# Patient Record
Sex: FEMALE | Race: ASIAN | NOT HISPANIC OR LATINO | Employment: OTHER | URBAN - METROPOLITAN AREA
[De-identification: names, ages, dates, MRNs, and addresses within clinical notes are randomized per-mention and may not be internally consistent; named-entity substitution may affect disease eponyms.]

---

## 2017-02-07 ENCOUNTER — HOSPITAL ENCOUNTER (OUTPATIENT)
Dept: CARDIOLOGY | Facility: MEDICAL CENTER | Age: 72
End: 2017-02-07
Attending: INTERNAL MEDICINE
Payer: MEDICARE

## 2017-02-07 DIAGNOSIS — R06.09 DYSPNEA ON EXERTION: ICD-10-CM

## 2017-02-07 LAB — LV EJECT FRACT  99904: 70

## 2017-02-07 PROCEDURE — 93018 CV STRESS TEST I&R ONLY: CPT | Performed by: INTERNAL MEDICINE

## 2017-02-07 PROCEDURE — 93017 CV STRESS TEST TRACING ONLY: CPT

## 2017-02-07 PROCEDURE — 93350 STRESS TTE ONLY: CPT | Mod: 26 | Performed by: INTERNAL MEDICINE

## 2017-02-07 PROCEDURE — 93350 STRESS TTE ONLY: CPT

## 2017-02-14 ENCOUNTER — HOSPITAL ENCOUNTER (OUTPATIENT)
Dept: RADIOLOGY | Facility: MEDICAL CENTER | Age: 72
End: 2017-02-14
Attending: INTERNAL MEDICINE
Payer: MEDICARE

## 2017-02-14 DIAGNOSIS — Z78.0 MENOPAUSE: ICD-10-CM

## 2017-02-14 DIAGNOSIS — M81.0 OSTEOPOROSIS: ICD-10-CM

## 2017-02-14 DIAGNOSIS — Z12.31 ENCOUNTER FOR SCREENING MAMMOGRAM FOR BREAST CANCER: ICD-10-CM

## 2017-02-14 PROCEDURE — 77063 BREAST TOMOSYNTHESIS BI: CPT

## 2017-02-14 PROCEDURE — 77080 DXA BONE DENSITY AXIAL: CPT

## 2017-02-14 RX ORDER — ALENDRONATE SODIUM 70 MG/1
70 TABLET ORAL
Qty: 12 TAB | Refills: 3 | Status: SHIPPED | OUTPATIENT
Start: 2017-02-14 | End: 2018-02-12 | Stop reason: SDUPTHER

## 2017-02-28 ENCOUNTER — HOSPITAL ENCOUNTER (OUTPATIENT)
Dept: LAB | Facility: MEDICAL CENTER | Age: 72
End: 2017-02-28
Attending: INTERNAL MEDICINE
Payer: MEDICARE

## 2017-02-28 DIAGNOSIS — E78.5 DYSLIPIDEMIA: ICD-10-CM

## 2017-02-28 DIAGNOSIS — I10 ESSENTIAL HYPERTENSION: ICD-10-CM

## 2017-02-28 DIAGNOSIS — E11.9 TYPE 2 DIABETES MELLITUS WITHOUT COMPLICATION, WITHOUT LONG-TERM CURRENT USE OF INSULIN (HCC): ICD-10-CM

## 2017-02-28 LAB
25(OH)D3 SERPL-MCNC: 29 NG/ML (ref 30–100)
CHOLEST SERPL-MCNC: 170 MG/DL (ref 100–199)
CREAT SERPL-MCNC: 0.59 MG/DL (ref 0.5–1.4)
HDLC SERPL-MCNC: 52 MG/DL
LDLC SERPL CALC-MCNC: 90 MG/DL
POTASSIUM SERPL-SCNC: 4 MMOL/L (ref 3.6–5.5)
TRIGL SERPL-MCNC: 142 MG/DL (ref 0–149)

## 2017-02-28 PROCEDURE — 82306 VITAMIN D 25 HYDROXY: CPT | Mod: GA

## 2017-02-28 PROCEDURE — 80061 LIPID PANEL: CPT

## 2017-02-28 PROCEDURE — 83695 ASSAY OF LIPOPROTEIN(A): CPT

## 2017-02-28 PROCEDURE — 83036 HEMOGLOBIN GLYCOSYLATED A1C: CPT | Mod: GA

## 2017-02-28 PROCEDURE — 84132 ASSAY OF SERUM POTASSIUM: CPT

## 2017-02-28 PROCEDURE — 82565 ASSAY OF CREATININE: CPT

## 2017-02-28 PROCEDURE — 36415 COLL VENOUS BLD VENIPUNCTURE: CPT | Mod: GA

## 2017-03-01 LAB
EST. AVERAGE GLUCOSE BLD GHB EST-MCNC: 137 MG/DL
HBA1C MFR BLD: 6.4 % (ref 0–5.6)

## 2017-03-02 LAB — LPA SERPL-MCNC: 3 MG/DL

## 2017-03-30 ENCOUNTER — OFFICE VISIT (OUTPATIENT)
Dept: MEDICAL GROUP | Facility: PHYSICIAN GROUP | Age: 72
End: 2017-03-30
Payer: MEDICARE

## 2017-03-30 ENCOUNTER — PATIENT MESSAGE (OUTPATIENT)
Dept: MEDICAL GROUP | Facility: PHYSICIAN GROUP | Age: 72
End: 2017-03-30

## 2017-03-30 VITALS
DIASTOLIC BLOOD PRESSURE: 70 MMHG | HEART RATE: 64 BPM | OXYGEN SATURATION: 98 % | TEMPERATURE: 97.2 F | SYSTOLIC BLOOD PRESSURE: 130 MMHG | BODY MASS INDEX: 24.54 KG/M2 | HEIGHT: 62 IN | WEIGHT: 133.38 LBS

## 2017-03-30 DIAGNOSIS — E55.9 VITAMIN D DEFICIENCY: ICD-10-CM

## 2017-03-30 DIAGNOSIS — R01.1 HEART MURMUR: ICD-10-CM

## 2017-03-30 DIAGNOSIS — E78.00 HYPERCHOLESTEREMIA: ICD-10-CM

## 2017-03-30 DIAGNOSIS — H91.93 HEARING LOSS, BILATERAL: ICD-10-CM

## 2017-03-30 DIAGNOSIS — R00.2 INTERMITTENT PALPITATIONS: ICD-10-CM

## 2017-03-30 DIAGNOSIS — E11.9 TYPE 2 DIABETES MELLITUS WITHOUT COMPLICATION, WITHOUT LONG-TERM CURRENT USE OF INSULIN (HCC): ICD-10-CM

## 2017-03-30 DIAGNOSIS — R06.09 DYSPNEA ON EXERTION: ICD-10-CM

## 2017-03-30 DIAGNOSIS — I10 ESSENTIAL HYPERTENSION: ICD-10-CM

## 2017-03-30 DIAGNOSIS — Z23 NEED FOR VACCINATION: ICD-10-CM

## 2017-03-30 PROCEDURE — 1036F TOBACCO NON-USER: CPT | Performed by: INTERNAL MEDICINE

## 2017-03-30 PROCEDURE — 4040F PNEUMOC VAC/ADMIN/RCVD: CPT | Performed by: INTERNAL MEDICINE

## 2017-03-30 PROCEDURE — 90670 PCV13 VACCINE IM: CPT | Performed by: INTERNAL MEDICINE

## 2017-03-30 PROCEDURE — 3044F HG A1C LEVEL LT 7.0%: CPT | Performed by: INTERNAL MEDICINE

## 2017-03-30 PROCEDURE — G8482 FLU IMMUNIZE ORDER/ADMIN: HCPCS | Performed by: INTERNAL MEDICINE

## 2017-03-30 PROCEDURE — 99214 OFFICE O/P EST MOD 30 MIN: CPT | Mod: 25 | Performed by: INTERNAL MEDICINE

## 2017-03-30 PROCEDURE — G0009 ADMIN PNEUMOCOCCAL VACCINE: HCPCS | Performed by: INTERNAL MEDICINE

## 2017-03-30 PROCEDURE — G8420 CALC BMI NORM PARAMETERS: HCPCS | Performed by: INTERNAL MEDICINE

## 2017-03-30 PROCEDURE — 1101F PT FALLS ASSESS-DOCD LE1/YR: CPT | Performed by: INTERNAL MEDICINE

## 2017-03-30 PROCEDURE — G8432 DEP SCR NOT DOC, RNG: HCPCS | Performed by: INTERNAL MEDICINE

## 2017-03-30 PROCEDURE — 3014F SCREEN MAMMO DOC REV: CPT | Performed by: INTERNAL MEDICINE

## 2017-03-30 RX ORDER — LISINOPRIL 10 MG/1
10 TABLET ORAL DAILY
Qty: 90 TAB | Refills: 3 | Status: SHIPPED | OUTPATIENT
Start: 2017-03-30 | End: 2018-05-24 | Stop reason: SDUPTHER

## 2017-03-30 NOTE — ASSESSMENT & PLAN NOTE
She stop lisinopril on her own. Pt well controled only on amlodipine. I discussed with pt, since she is DMII, she will benefits more from lisinopril than amlodipine. Agreed on starting lisinopril again, increase to 10 mg daily.  She denies any chest pain, palpitation, shortness of breath, leg swellings, headaches, blurry visions.

## 2017-03-30 NOTE — PROGRESS NOTES
Subjective:   Kacey Whelan is a 71 y.o. female here today for DMII, lab work review, hypertension, discontinue some medications     Vitamin D deficiency  On supplement. Last level 29. Continue to monitor     Type 2 diabetes mellitus without complication, without long-term current use of insulin (CMS-[facility])  Last A1c 6.4. She'll like to be off metformin. She will continue to work on diet control. She stopped taking lisinopril. Advised to continue lisinopril. Decided to stop Amlodipine, and we will increase lisinopril to 10 mg. Patient agreed with the plan. LDL less than 100. She is on baby aspirin 81 mg daily. Will reevaluate in 3 months. She denies polyphagia, polyuria, nocturia, numbness or tingling. I advised patient to continue monitor blood sugar only 3 times a week. She will check one time fasting, before lunch and at bedtime.     Intermittent palpitations  She had stress echocardiogram, noted PVC. Stable.     Hypercholesteremia  Lovastatin 10 mg daily. LDL 90 (02/2017). Primary prevention . Continue to monitor     HTN (hypertension)  She stop lisinopril on her own. Pt well controled only on amlodipine. I discussed with pt, since she is DMII, she will benefits more from lisinopril than amlodipine. Agreed on starting lisinopril again, increase to 10 mg daily.  She denies any chest pain, palpitation, shortness of breath, leg swellings, headaches, blurry visions.    Heart murmur  She is systolic ejection murmur 3 out of 5 motor , pronounced to the left second intercostal area. Patient had a gross stress test but she did not have an echocardiogram. Still I don't have evaluation of her valves . Sometimes she has some shortness of breath which I'm not sure if that is related to valve problem. Stress echocardiogram negative for ischemia     Hearing loss  During low since she was 32-year-old. She cannot afford hearing aids. She denies any earache, ringing ears, headaches, balance problems.       Current medicines  (including changes today)  Current Outpatient Prescriptions   Medication Sig Dispense Refill   • Calcium Citrate-Vitamin D (CALCIUM + D PO) Take  by mouth.     • lisinopril (PRINIVIL) 10 MG Tab Take 1 Tab by mouth every day. 90 Tab 3   • alendronate (FOSAMAX) 70 MG Tab Take 1 Tab by mouth every 7 days. 12 Tab 3   • therapeutic multivitamin-minerals (THERAGRAN-M) Tab Take 1 Tab by mouth every day.     • lovastatin (MEVACOR) 10 MG tablet Take 1 Tab by mouth every day. In the evening. 90 Tab 3   • aspirin 81 MG tablet Take 1 Tab by mouth every day. 100 Tab 3   • Blood Glucose Monitoring Suppl SUPPLIES Misc Test strips order: Test strips for Meter that's covered by insurance. Sig: use tid and prn ssx high or low sugar #100 RF x 3 100 Strip 11   • Lancets Misc Lancets order: Lancets  Sig: use bid and prn ssx high or low sugar. #100 RF x 3 100 Each 11   • Blood Glucose Monitoring Suppl Device Meter: Dispense Device of Insurance Preference. Sig. Use as directed for blood sugar monitoring. #1. NR. 1 Device 1     No current facility-administered medications for this visit.     She  has a past medical history of Diabetes (CMS-[facility]); Hypertension; Hearing loss; and Hyperlipidemia.    Current Outpatient Prescriptions   Medication Sig Dispense Refill   • Calcium Citrate-Vitamin D (CALCIUM + D PO) Take  by mouth.     • lisinopril (PRINIVIL) 10 MG Tab Take 1 Tab by mouth every day. 90 Tab 3   • alendronate (FOSAMAX) 70 MG Tab Take 1 Tab by mouth every 7 days. 12 Tab 3   • therapeutic multivitamin-minerals (THERAGRAN-M) Tab Take 1 Tab by mouth every day.     • lovastatin (MEVACOR) 10 MG tablet Take 1 Tab by mouth every day. In the evening. 90 Tab 3   • aspirin 81 MG tablet Take 1 Tab by mouth every day. 100 Tab 3   • Blood Glucose Monitoring Suppl SUPPLIES Misc Test strips order: Test strips for Meter that's covered by insurance. Sig: use tid and prn ssx high or low sugar #100 RF x 3 100 Strip 11   • Lancets Misc Lancets order:  "Lancets  Sig: use bid and prn ssx high or low sugar. #100 RF x 3 100 Each 11   • Blood Glucose Monitoring Suppl Device Meter: Dispense Device of Insurance Preference. Sig. Use as directed for blood sugar monitoring. #1. NR. 1 Device 1     No current facility-administered medications for this visit.       Allergies as of 03/30/2017 - Jonatan as Reviewed 03/30/2017   Allergen Reaction Noted   • Latex  03/06/2015       Social History     Social History   • Marital Status:      Spouse Name: N/A   • Number of Children: N/A   • Years of Education: N/A     Occupational History   • Not on file.     Social History Main Topics   • Smoking status: Never Smoker    • Smokeless tobacco: Never Used   • Alcohol Use: No   • Drug Use: No   • Sexual Activity: No      Comment:      Other Topics Concern   • Not on file     Social History Narrative        Family History   Problem Relation Age of Onset   • Cancer Mother      breast cancer   • Heart Disease Father 46     MI   • Heart Disease Sister      2 heart surgery   • Diabetes Brother    • Cancer Brother      brain tumor       Past Surgical History   Procedure Laterality Date   • Primary c section       3 c section   • Tonsillectomy         ROS   All systems reviewed are negative except for history of present illness     Objective:     Blood pressure 130/70, pulse 64, temperature 36.2 °C (97.2 °F), height 1.575 m (5' 2\"), weight 60.5 kg (133 lb 6.1 oz), SpO2 98 %. Body mass index is 24.39 kg/(m^2).   Physical Exam:  Constitutional: Alert, no distress.  Skin: Warm, dry, good turgor, no rashes in visible areas.  Eye: Equal, round and reactive, conjunctiva clear, lids normal.  ENMT: Lips without lesions, good dentition, oropharynx clear.  Neck: Trachea midline, no masses, no thyromegaly. No cervical or supraclavicular lymphadenopathy  Respiratory: Unlabored respiratory effort, lungs clear to auscultation, no wheezes, no ronchi.  Cardiovascular: Normal S1, S2,  Ejection murmur " 3/5, no edema.  Abdomen: Soft, non-tender, no masses, no hepatosplenomegaly.  Psych: Alert and oriented x3, normal affect and mood.        Assessment and Plan:   The following treatment plan was discussed    1. Type 2 diabetes mellitus without complication, without long-term current use of insulin (CMS-[facility])  Stop metformin . She is on minimal dose. reevaluate in 3 months   - COMP METABOLIC PANEL; Future  - HEMOGLOBIN A1C; Future    2. Essential hypertension  Stop amlodipine. Increase lisinopril to 10 mg. conitnue to monitor  - COMP METABOLIC PANEL; Future    3. Dyspnea on exertion  Resolving, less frequently. Echocardiogram to follow, rule out aortic stenosis    4. Intermittent palpitations  PVC, stable. No need for BB at this time. HR 66     5. Hypercholesteremia  On proper medical management. Continue to monitor     6. Vitamin D deficiency  Continue supplement     7. Heart murmur  Likely aortic stenosis. Needs echocardiogram for more detail eval.   - ECHOCARDIOGRAM COMP W/O CONT; Future    8. Need for vaccination  - PNEUMOCOCCAL CONJUGATE VACCINE 13-VALENT    9. Hearing loss, bilateral  Referral for ENT in place       Followup: Return in about 3 months (around 6/30/2017) for follow up on DMII, follow up on HTN, Short.

## 2017-03-30 NOTE — ASSESSMENT & PLAN NOTE
During low since she was 32-year-old. She cannot afford hearing aids. She denies any earache, ringing ears, headaches, balance problems.

## 2017-03-30 NOTE — ASSESSMENT & PLAN NOTE
Last A1c 6.4. She'll like to be off metformin. She will continue to work on diet control. She stopped taking lisinopril. Advised to continue lisinopril. Decided to stop Amlodipine, and we will increase lisinopril to 10 mg. Patient agreed with the plan. LDL less than 100. She is on baby aspirin 81 mg daily. Will reevaluate in 3 months. She denies polyphagia, polyuria, nocturia, numbness or tingling. I advised patient to continue monitor blood sugar only 3 times a week. She will check one time fasting, before lunch and at bedtime.

## 2017-03-30 NOTE — ASSESSMENT & PLAN NOTE
She is systolic ejection murmur 3 out of 5 motor , pronounced to the left second intercostal area. Patient had a gross stress test but she did not have an echocardiogram. Still I don't have evaluation of her valves . Sometimes she has some shortness of breath which I'm not sure if that is related to valve problem. Stress echocardiogram negative for ischemia

## 2017-03-30 NOTE — MR AVS SNAPSHOT
"        Kacey Morrisoning   3/30/2017 10:40 AM   Office Visit   MRN: [id number]    Department:  [facility]   Dept Phone:  [phone]    Description:  Female : 1945   Provider:  Khadijah Villagomez M.D.           Reason for Visit     Diabetes Mellitus follow up      Allergies as of 3/30/2017     Allergen Noted Reactions    Latex 2015         You were diagnosed with     Type 2 diabetes mellitus without complication, without long-term current use of insulin (CMS-HCC)   [4069414]       Essential hypertension   [1371697]       Dyspnea on exertion   [700530]       Intermittent palpitations   [9248650]       Hypercholesteremia   [963823]       Vitamin D deficiency   [3689343]       Heart murmur   [815740]       Need for vaccination   [620583]         Vital Signs     Blood Pressure Pulse Temperature Height Weight Body Mass Index    130/70 mmHg 64 36.2 °C (97.2 °F) 1.575 m (5' 2\") 60.5 kg (133 lb 6.1 oz) 24.39 kg/m2    Oxygen Saturation Smoking Status                98% Never Smoker           Basic Information     Date Of Birth Sex Race Ethnicity Preferred Language    1945 Female  Patient Refused English      Problem List              ICD-10-CM Priority Class Noted - Resolved    HTN (hypertension) I10   3/6/2015 - Present    Heart murmur R01.1   3/6/2015 - Present    Hearing loss H91.90   3/6/2015 - Present    Type 2 diabetes mellitus without complication, without long-term current use of insulin (CMS-HCC) E11.9   10/14/2016 - Present    Hypercholesteremia E78.00   10/14/2016 - Present    Intermittent palpitations R00.2   2016 - Present    Vitamin D deficiency E55.9   3/30/2017 - Present      Health Maintenance        Date Due Completion Dates    IMM DTaP/Tdap/Td Vaccine (1 - Tdap) 10/12/1966 ---    IMM ZOSTER VACCINE 10/12/2007 ---    IMM PNEUMOCOCCAL 65+ (ADULT) LOW/MEDIUM RISK SERIES (1 of 2 - PCV13) 10/12/2012 ---    COLONOSCOPY 2017 (Originally 10/12/1997) ---    A1C SCREENING 2017 " 2/28/2017, 12/19/2016, 10/6/2016    URINE ACR / MICROALBUMIN 10/15/2017 10/15/2016    RETINAL SCREENING 11/30/2017 11/30/2016    DIABETES MONOFILAMENT / LE EXAM 12/27/2017 12/27/2016, 12/19/2016, 10/14/2016    MAMMOGRAM 2/14/2018 2/14/2017    FASTING LIPID PROFILE 2/28/2018 2/28/2017, 10/6/2016, 7/8/2015    SERUM CREATININE 2/28/2018 2/28/2017, 10/6/2016, 7/8/2015    BONE DENSITY 2/14/2022 2/14/2017            Current Immunizations     13-VALENT PCV PREVNAR  Incomplete    Influenza Vaccine Adult HD 9/5/2016      Below and/or attached are the medications your provider expects you to take. Review all of your home medications and newly ordered medications with your provider and/or pharmacist. Follow medication instructions as directed by your provider and/or pharmacist. Please keep your medication list with you and share with your provider. Update the information when medications are discontinued, doses are changed, or new medications (including over-the-counter products) are added; and carry medication information at all times in the event of emergency situations     Allergies:  LATEX - (reactions not documented)               Medications  Valid as of: March 30, 2017 - 11:06 AM    Generic Name Brand Name Tablet Size Instructions for use    Alendronate Sodium (Tab) FOSAMAX 70 MG Take 1 Tab by mouth every 7 days.        Aspirin (Tab) aspirin 81 MG Take 1 Tab by mouth every day.        Blood Glucose Monitoring Suppl (Misc) Blood Glucose Monitoring Suppl SUPPLIES Test strips order: Test strips for Meter that's covered by insurance. Sig: use tid and prn ssx high or low sugar #100 RF x 3        Blood Glucose Monitoring Suppl (Device) Blood Glucose Monitoring Suppl  Meter: Dispense Device of Insurance Preference. Sig. Use as directed for blood sugar monitoring. #1. NR.        Calcium Citrate-Vitamin D   Take  by mouth.        Lancets (Misc) Lancets  Lancets order: Lancets  Sig: use bid and prn ssx high or low sugar. #100 RF  x 3        Lisinopril (Tab) PRINIVIL 10 MG Take 1 Tab by mouth every day.        Lovastatin (Tab) MEVACOR 10 MG Take 1 Tab by mouth every day. In the evening.        Multiple Vitamins-Minerals (Tab) THERAGRAN-M  Take 1 Tab by mouth every day.        .                 Medicines prescribed today were sent to:     [facility] [address] [place] [address] (), [place] - [address]    [address] () [place] [address]    Phone: [phone] Fax: [phone]    Open 24 Hours?: No      Medication refill instructions:       If your prescription bottle indicates you have medication refills left, it is not necessary to call your provider’s office. Please contact your pharmacy and they will refill your medication.    If your prescription bottle indicates you do not have any refills left, you may request refills at any time through one of the following ways: The online [employer] system (except Urgent Care), by calling your provider’s office, or by asking your pharmacy to contact your provider’s office with a refill request. Medication refills are processed only during regular business hours and may not be available until the next business day. Your provider may request additional information or to have a follow-up visit with you prior to refilling your medication.   *Please Note: Medication refills are assigned a new Rx number when refilled electronically. Your pharmacy may indicate that no refills were authorized even though a new prescription for the same medication is available at the pharmacy. Please request the medicine by name with the pharmacy before contacting your provider for a refill.        Your To Do List     Future Labs/Procedures Complete By Expires    COMP METABOLIC PANEL  As directed 3/31/2018    ECHOCARDIOGRAM COMP W/O CONT  As directed 3/30/2018    Scheduling Instructions:    Heart murmur, sustolic. Concerned for aortic stenois    HEMOGLOBIN A1C  As directed 3/31/2018         [employer] Access Code: Activation  code not generated  Current MyChart Status: Active

## 2017-04-27 ENCOUNTER — HOSPITAL ENCOUNTER (OUTPATIENT)
Dept: CARDIOLOGY | Facility: MEDICAL CENTER | Age: 72
End: 2017-04-27
Attending: INTERNAL MEDICINE
Payer: MEDICARE

## 2017-04-27 DIAGNOSIS — R01.1 HEART MURMUR: ICD-10-CM

## 2017-04-27 LAB
LV EJECT FRACT  99904: 60
LV EJECT FRACT MOD 2C 99903: 59.42
LV EJECT FRACT MOD 4C 99902: 71.12
LV EJECT FRACT MOD BP 99901: 68.51

## 2017-04-27 PROCEDURE — 93306 TTE W/DOPPLER COMPLETE: CPT

## 2017-04-27 PROCEDURE — 93306 TTE W/DOPPLER COMPLETE: CPT | Mod: 26 | Performed by: INTERNAL MEDICINE

## 2017-04-28 ENCOUNTER — TELEPHONE (OUTPATIENT)
Dept: MEDICAL GROUP | Facility: PHYSICIAN GROUP | Age: 72
End: 2017-04-28

## 2017-06-19 ENCOUNTER — HOSPITAL ENCOUNTER (OUTPATIENT)
Dept: LAB | Facility: MEDICAL CENTER | Age: 72
End: 2017-06-19
Attending: INTERNAL MEDICINE
Payer: MEDICARE

## 2017-06-19 DIAGNOSIS — E11.9 TYPE 2 DIABETES MELLITUS WITHOUT COMPLICATION, WITHOUT LONG-TERM CURRENT USE OF INSULIN (HCC): ICD-10-CM

## 2017-06-19 DIAGNOSIS — I10 ESSENTIAL HYPERTENSION: ICD-10-CM

## 2017-06-19 LAB
ALBUMIN SERPL BCP-MCNC: 4 G/DL (ref 3.2–4.9)
ALBUMIN/GLOB SERPL: 1.3 G/DL
ALP SERPL-CCNC: 40 U/L (ref 30–99)
ALT SERPL-CCNC: 12 U/L (ref 2–50)
ANION GAP SERPL CALC-SCNC: 5 MMOL/L (ref 0–11.9)
AST SERPL-CCNC: 13 U/L (ref 12–45)
BILIRUB SERPL-MCNC: 0.5 MG/DL (ref 0.1–1.5)
BUN SERPL-MCNC: 14 MG/DL (ref 8–22)
CALCIUM SERPL-MCNC: 8.6 MG/DL (ref 8.5–10.5)
CHLORIDE SERPL-SCNC: 107 MMOL/L (ref 96–112)
CO2 SERPL-SCNC: 25 MMOL/L (ref 20–33)
CREAT SERPL-MCNC: 0.78 MG/DL (ref 0.5–1.4)
EST. AVERAGE GLUCOSE BLD GHB EST-MCNC: 131 MG/DL
GFR SERPL CREATININE-BSD FRML MDRD: >60 ML/MIN/1.73 M 2
GLOBULIN SER CALC-MCNC: 3.2 G/DL (ref 1.9–3.5)
GLUCOSE SERPL-MCNC: 148 MG/DL (ref 65–99)
HBA1C MFR BLD: 6.2 % (ref 0–5.6)
POTASSIUM SERPL-SCNC: 4.2 MMOL/L (ref 3.6–5.5)
PROT SERPL-MCNC: 7.2 G/DL (ref 6–8.2)
SODIUM SERPL-SCNC: 137 MMOL/L (ref 135–145)

## 2017-06-19 PROCEDURE — 80053 COMPREHEN METABOLIC PANEL: CPT

## 2017-06-19 PROCEDURE — 83036 HEMOGLOBIN GLYCOSYLATED A1C: CPT | Mod: GA

## 2017-06-19 PROCEDURE — 36415 COLL VENOUS BLD VENIPUNCTURE: CPT

## 2017-06-20 NOTE — PROGRESS NOTES
Quick Note:    Jose Erazo,   labs look good, blood sugars are still elevated, but better then last time. We can discuss further at your next appointment.   Best,   Khadijah Villagomez M.D.    ______

## 2017-06-26 ENCOUNTER — OFFICE VISIT (OUTPATIENT)
Dept: MEDICAL GROUP | Facility: PHYSICIAN GROUP | Age: 72
End: 2017-06-26
Payer: MEDICARE

## 2017-06-26 VITALS
SYSTOLIC BLOOD PRESSURE: 128 MMHG | OXYGEN SATURATION: 98 % | TEMPERATURE: 98 F | HEIGHT: 62 IN | DIASTOLIC BLOOD PRESSURE: 84 MMHG | HEART RATE: 74 BPM | WEIGHT: 130 LBS | BODY MASS INDEX: 23.92 KG/M2 | RESPIRATION RATE: 16 BRPM

## 2017-06-26 DIAGNOSIS — E55.9 VITAMIN D DEFICIENCY: ICD-10-CM

## 2017-06-26 DIAGNOSIS — E78.00 HYPERCHOLESTEREMIA: ICD-10-CM

## 2017-06-26 DIAGNOSIS — R01.1 HEART MURMUR: ICD-10-CM

## 2017-06-26 DIAGNOSIS — I10 ESSENTIAL HYPERTENSION: ICD-10-CM

## 2017-06-26 DIAGNOSIS — M81.0 AGE-RELATED OSTEOPOROSIS WITHOUT CURRENT PATHOLOGICAL FRACTURE: ICD-10-CM

## 2017-06-26 DIAGNOSIS — E11.9 TYPE 2 DIABETES MELLITUS WITHOUT COMPLICATION, WITHOUT LONG-TERM CURRENT USE OF INSULIN (HCC): ICD-10-CM

## 2017-06-26 DIAGNOSIS — H90.6 MIXED CONDUCTIVE AND SENSORINEURAL HEARING LOSS OF BOTH EARS: ICD-10-CM

## 2017-06-26 PROCEDURE — 99214 OFFICE O/P EST MOD 30 MIN: CPT | Performed by: INTERNAL MEDICINE

## 2017-06-26 NOTE — MR AVS SNAPSHOT
"        Kacey Morrisoning   2017 10:40 AM   Office Visit   MRN: [id number]    Department:  [facility]   Dept Phone:  [phone]    Description:  Female : 1945   Provider:  Khadijah Villagomez M.D.           Reason for Visit     Diabetes Mellitus 3 month follow u p      Allergies as of 2017     Allergen Noted Reactions    Latex 2015         You were diagnosed with     Essential hypertension   [0270935]       Heart murmur   [174540]       Mixed conductive and sensorineural hearing loss of both ears   [429528]       Type 2 diabetes mellitus without complication, without long-term current use of insulin (CMS-[facility])   [2369033]       Age-related osteoporosis without current pathological fracture   [924622]       Hypercholesteremia   [453504]       Vitamin D deficiency   [7419248]         Vital Signs     Blood Pressure Pulse Temperature Respirations Height Weight    128/84 mmHg 74 36.7 °C (98 °F) 16 1.575 m (5' 2.01\") 58.968 kg (130 lb)    Body Mass Index Oxygen Saturation Breastfeeding? Smoking Status          23.77 kg/m2 98% No Never Smoker         Basic Information     Date Of Birth Sex Race Ethnicity Preferred Language    1945 Female  Patient Refused English      Your appointments     Dec 28, 2017 10:40 AM   Established Patient with Khadijah Villagomez M.D.   [facility] (--)    [address] [facility]  [address]  [facility] [id number] [phone]           You will be receiving a confirmation call a few days before your appointment from our automated call confirmation system.              Problem List              ICD-10-CM Priority Class Noted - Resolved    HTN (hypertension) I10   3/6/2015 - Present    Heart murmur R01.1   3/6/2015 - Present    Hearing loss H91.90   3/6/2015 - Present    Type 2 diabetes mellitus without complication, without long-term current use of insulin (CMS-HCC) E11.9   10/14/2016 - Present    Hypercholesteremia E78.00   10/14/2016 - Present    Vitamin D deficiency " E55.9   3/30/2017 - Present    Age-related osteoporosis without current pathological fracture M81.0   6/26/2017 - Present      Health Maintenance        Date Due Completion Dates    IMM DTaP/Tdap/Td Vaccine (1 - Tdap) 10/12/1966 ---    IMM ZOSTER VACCINE 10/12/2007 ---    COLONOSCOPY 12/14/2017 (Originally 10/12/1997) ---    URINE ACR / MICROALBUMIN 10/15/2017 10/15/2016    RETINAL SCREENING 11/30/2017 11/30/2016    A1C SCREENING 12/19/2017 6/19/2017, 2/28/2017, 12/19/2016, 10/6/2016    DIABETES MONOFILAMENT / LE EXAM 12/27/2017 12/27/2016, 12/19/2016, 10/14/2016    MAMMOGRAM 2/14/2018 2/14/2017    FASTING LIPID PROFILE 2/28/2018 2/28/2017, 10/6/2016, 7/8/2015    IMM PNEUMOCOCCAL 65+ (ADULT) LOW/MEDIUM RISK SERIES (2 of 2 - PPSV23) 3/30/2018 3/30/2017    SERUM CREATININE 6/19/2018 6/19/2017, 2/28/2017, 10/6/2016, 7/8/2015    BONE DENSITY 2/14/2022 2/14/2017            Current Immunizations     13-VALENT PCV PREVNAR 3/30/2017    Influenza Vaccine Adult HD 9/5/2016      Below and/or attached are the medications your provider expects you to take. Review all of your home medications and newly ordered medications with your provider and/or pharmacist. Follow medication instructions as directed by your provider and/or pharmacist. Please keep your medication list with you and share with your provider. Update the information when medications are discontinued, doses are changed, or new medications (including over-the-counter products) are added; and carry medication information at all times in the event of emergency situations     Allergies:  LATEX - (reactions not documented)               Medications  Valid as of: June 26, 2017 - 11:25 AM    Generic Name Brand Name Tablet Size Instructions for use    Alendronate Sodium (Tab) FOSAMAX 70 MG Take 1 Tab by mouth every 7 days.        Aspirin (Tab) aspirin 81 MG Take 1 Tab by mouth every day.        Blood Glucose Monitoring Suppl (Misc) Blood Glucose Monitoring Suppl SUPPLIES  Test strips order: Test strips for Meter that's covered by insurance. Sig: use tid and prn ssx high or low sugar #100 RF x 3        Blood Glucose Monitoring Suppl (Device) Blood Glucose Monitoring Suppl  Meter: Dispense Device of Insurance Preference. Sig. Use as directed for blood sugar monitoring. #1. NR.        Calcium Citrate-Vitamin D   Take  by mouth.        Lancets (Misc) Lancets  Lancets order: Lancets  Sig: use bid and prn ssx high or low sugar. #100 RF x 3        Lisinopril (Tab) PRINIVIL 10 MG Take 1 Tab by mouth every day.        Lovastatin (Tab) MEVACOR 10 MG Take 1 Tab by mouth every day. In the evening.        Multiple Vitamins-Minerals (Tab) THERAGRAN-M  Take 1 Tab by mouth every day.        .                 Medicines prescribed today were sent to:     [facility] [address] [place] [address] (), [place] - [address] () [place] [address]    Phone: [phone] Fax: [phone]    Open 24 Hours?: No      Medication refill instructions:       If your prescription bottle indicates you have medication refills left, it is not necessary to call your provider’s office. Please contact your pharmacy and they will refill your medication.    If your prescription bottle indicates you do not have any refills left, you may request refills at any time through one of the following ways: The online [employer] system (except Urgent Care), by calling your provider’s office, or by asking your pharmacy to contact your provider’s office with a refill request. Medication refills are processed only during regular business hours and may not be available until the next business day. Your provider may request additional information or to have a follow-up visit with you prior to refilling your medication.   *Please Note: Medication refills are assigned a new Rx number when refilled electronically. Your pharmacy may indicate that no refills were authorized even though a new prescription for the same medication  is available at the pharmacy. Please request the medicine by name with the pharmacy before contacting your provider for a refill.        Your To Do List     Future Labs/Procedures Complete By Expires    COMP METABOLIC PANEL  As directed 6/27/2018    HEMOGLOBIN A1C  As directed 6/27/2018    VITAMIN D,25 HYDROXY  As directed 6/27/2018         MyChart Access Code: Activation code not generated  Current [facility] Status: Active

## 2017-06-26 NOTE — ASSESSMENT & PLAN NOTE
DEXA scan 02/2017. She was placed on fosamax. Tolerating it well. No pathological fracture. Continue to monitor     FINDINGS:  lumbar spine is T score of -2.8 and   The proximal left femur T score of -2.4

## 2017-06-26 NOTE — ASSESSMENT & PLAN NOTE
Echocardiogram mild aortic stenosis.04/27/2017 . Not symptomatic  She tells me that she is feeling great. Her palpitations resolved. She has had stress test normal    CONCLUSIONS  No prior study is available for comparison.   Normal left ventricular chamber size. Normal left ventricular wall   thickness. Normal left ventricular systolic function. Left ventricular   ejection fraction is visually estimated to be 60%. Normal regional wall   motion. Grade I diastolic dysfunction.  Mildly calcified aortic valve leaflets. Mild aortic stenosis.   Transvalvular gradients are - Peak: 18 mmHg, Mean: 11 mmHg.   Dimensionless index is (0.50). No aortic insufficiency

## 2017-06-26 NOTE — ASSESSMENT & PLAN NOTE
Last a1c 6.2, diet controled. She tells me that she had changed diet, she is doing carb counting, 45 g per day. She had lost 3 lbs, A1c 6.2. Off metformin and she is feeling better off metformin. She is checking Blood glucose at home and fasting 104-114. After meals 133-140. She denies polyuria, nocturia, unintentional weight loss, neuropathy. Vision intact. She is on asa 81 mg daily.

## 2017-06-26 NOTE — ASSESSMENT & PLAN NOTE
She has 70% hearing loss from the left one, and 40 % of the right one. She cannot afford hearing aid. sensoneuroal loss. She denies hearing aches

## 2017-06-26 NOTE — ASSESSMENT & PLAN NOTE
Blood pressure well controlled on lisinopril 10 mg daily. She denies headache, chest pain, palpitation, swelling, blurry visions, or lightheadedness

## 2017-06-26 NOTE — PROGRESS NOTES
Subjective:   Kacey Whelan is a 71 y.o. female here today for hypertension, diabetic    Age-related osteoporosis without current pathological fracture  DEXA scan 02/2017. She was placed on fosamax. Tolerating it well. No pathological fracture. Continue to monitor     FINDINGS:  lumbar spine is T score of -2.8 and   The proximal left femur T score of -2.4     HTN (hypertension)  Blood pressure well controlled on lisinopril 10 mg daily. She denies headache, chest pain, palpitation, swelling, blurry visions, or lightheadedness    Heart murmur  Echocardiogram mild aortic stenosis.04/27/2017 . Not symptomatic  She tells me that she is feeling great. Her palpitations resolved. She has had stress test normal    CONCLUSIONS  No prior study is available for comparison.   Normal left ventricular chamber size. Normal left ventricular wall   thickness. Normal left ventricular systolic function. Left ventricular   ejection fraction is visually estimated to be 60%. Normal regional wall   motion. Grade I diastolic dysfunction.  Mildly calcified aortic valve leaflets. Mild aortic stenosis.   Transvalvular gradients are - Peak: 18 mmHg, Mean: 11 mmHg.   Dimensionless index is (0.50). No aortic insufficiency    Hearing loss  She has 70% hearing loss from the left one, and 40 % of the right one. She cannot afford hearing aid. sensoneuroal loss. She denies hearing aches     Type 2 diabetes mellitus without complication, without long-term current use of insulin (CMS-[facility])  Last a1c 6.2, diet controled. She tells me that she had changed diet, she is doing carb counting, 45 g per day. She had lost 3 lbs, A1c 6.2. Off metformin and she is feeling better off metformin. She is checking Blood glucose at home and fasting 104-114. After meals 133-140. She denies polyuria, nocturia, unintentional weight loss, neuropathy. Vision intact. She is on asa 81 mg daily.     Hypercholesteremia  She is on lovastatin 10 mg daily. LDL 90 (02/2017).  Primary prevention. She is tolerating current regimen well. She denies myalgia. Continued to monitor    Vitamin D deficiency  On supplement. Last level 29. Continue to monitor        Current medicines (including changes today)  Current Outpatient Prescriptions   Medication Sig Dispense Refill   • Calcium Citrate-Vitamin D (CALCIUM + D PO) Take  by mouth.     • lisinopril (PRINIVIL) 10 MG Tab Take 1 Tab by mouth every day. 90 Tab 3   • alendronate (FOSAMAX) 70 MG Tab Take 1 Tab by mouth every 7 days. 12 Tab 3   • therapeutic multivitamin-minerals (THERAGRAN-M) Tab Take 1 Tab by mouth every day.     • Blood Glucose Monitoring Suppl SUPPLIES Misc Test strips order: Test strips for Meter that's covered by insurance. Sig: use tid and prn ssx high or low sugar #100 RF x 3 100 Strip 11   • Lancets Misc Lancets order: Lancets  Sig: use bid and prn ssx high or low sugar. #100 RF x 3 100 Each 11   • Blood Glucose Monitoring Suppl Device Meter: Dispense Device of Insurance Preference. Sig. Use as directed for blood sugar monitoring. #1. NR. 1 Device 1   • lovastatin (MEVACOR) 10 MG tablet Take 1 Tab by mouth every day. In the evening. 90 Tab 3   • aspirin 81 MG tablet Take 1 Tab by mouth every day. 100 Tab 3     No current facility-administered medications for this visit.     She  has a past medical history of Diabetes (CMS-[facility]); Hypertension; Hearing loss; and Hyperlipidemia.    Current Outpatient Prescriptions   Medication Sig Dispense Refill   • Calcium Citrate-Vitamin D (CALCIUM + D PO) Take  by mouth.     • lisinopril (PRINIVIL) 10 MG Tab Take 1 Tab by mouth every day. 90 Tab 3   • alendronate (FOSAMAX) 70 MG Tab Take 1 Tab by mouth every 7 days. 12 Tab 3   • therapeutic multivitamin-minerals (THERAGRAN-M) Tab Take 1 Tab by mouth every day.     • Blood Glucose Monitoring Suppl SUPPLIES Misc Test strips order: Test strips for Meter that's covered by insurance. Sig: use tid and prn ssx high or low sugar #100 RF x 3 100  "Strip 11   • Lancets Misc Lancets order: Lancets  Sig: use bid and prn ssx high or low sugar. #100 RF x 3 100 Each 11   • Blood Glucose Monitoring Suppl Device Meter: Dispense Device of Insurance Preference. Sig. Use as directed for blood sugar monitoring. #1. NR. 1 Device 1   • lovastatin (MEVACOR) 10 MG tablet Take 1 Tab by mouth every day. In the evening. 90 Tab 3   • aspirin 81 MG tablet Take 1 Tab by mouth every day. 100 Tab 3     No current facility-administered medications for this visit.       Allergies as of 06/26/2017 - Jonatan as Reviewed 06/26/2017   Allergen Reaction Noted   • Latex  03/06/2015       Social History     Social History   • Marital Status:      Spouse Name: N/A   • Number of Children: N/A   • Years of Education: N/A     Occupational History   • Not on file.     Social History Main Topics   • Smoking status: Never Smoker    • Smokeless tobacco: Never Used   • Alcohol Use: No   • Drug Use: No   • Sexual Activity: No      Comment:      Other Topics Concern   • Not on file     Social History Narrative        Family History   Problem Relation Age of Onset   • Cancer Mother      breast cancer   • Heart Disease Father 46     MI   • Heart Disease Sister      2 heart surgery   • Diabetes Brother    • Cancer Brother      brain tumor       Past Surgical History   Procedure Laterality Date   • Primary c section       3 c section   • Tonsillectomy         ROS   All systems reviewed are negative except for HPI       Objective:     Blood pressure 128/84, pulse 74, temperature 36.7 °C (98 °F), resp. rate 16, height 1.575 m (5' 2.01\"), weight 58.968 kg (130 lb), SpO2 98 %, not currently breastfeeding. Body mass index is 23.77 kg/(m^2).   Physical Exam:  Constitutional: Alert, no distress.  Skin: Warm, dry, good turgor, no rashes in visible areas.  Eye: Equal, round and reactive, conjunctiva clear, lids normal.  ENMT: Lips without lesions, good dentition, oropharynx clear.  Neck: Trachea midline, " no masses, no thyromegaly. No cervical or supraclavicular lymphadenopathy  Respiratory: Unlabored respiratory effort, lungs clear to auscultation, no wheezes, no ronchi.  Cardiovascular: Normal S1, S2,+ 3/5 ejection murmur, no edema.  Abdomen: Soft, non-tender, no masses, no hepatosplenomegaly.  Psych: Alert and oriented x3, normal affect and mood.        Assessment and Plan:   The following treatment plan was discussed    1. Essential hypertension  Continue same regimen. Continue to monitor    2. Heart murmur  Continue to monitor    3. Mixed conductive and sensorineural hearing loss of both ears  Follow-up with ENT    4. Type 2 diabetes mellitus without complication, without long-term current use of insulin (CMS-[facility])  Diet controlled. Continue healthy lifestyle. Continue monitor  - COMP METABOLIC PANEL; Future  - HEMOGLOBIN A1C; Future    5. Age-related osteoporosis without current pathological fracture  Continue same regimen. Continue to monitor    6. Hypercholesteremia  Continue same regimen. Continue to monitor    7. Vitamin D deficiency  Continue same regimen. Continue to monitor  - VITAMIN D,25 HYDROXY; Future      Followup: Return in about 6 months (around 12/26/2017) for Short.

## 2017-06-26 NOTE — ASSESSMENT & PLAN NOTE
She is on lovastatin 10 mg daily. LDL 90 (02/2017). Primary prevention. She is tolerating current regimen well. She denies myalgia. Continued to monitor

## 2017-08-15 RX ORDER — LOVASTATIN 10 MG/1
TABLET ORAL
Qty: 90 TAB | Refills: 3 | Status: SHIPPED | OUTPATIENT
Start: 2017-08-15 | End: 2018-11-05 | Stop reason: SDUPTHER

## 2017-09-11 ENCOUNTER — HOSPITAL ENCOUNTER (OUTPATIENT)
Dept: LAB | Facility: MEDICAL CENTER | Age: 72
End: 2017-09-11
Attending: INTERNAL MEDICINE
Payer: MEDICARE

## 2017-09-11 DIAGNOSIS — E55.9 VITAMIN D DEFICIENCY: ICD-10-CM

## 2017-09-11 DIAGNOSIS — E11.9 TYPE 2 DIABETES MELLITUS WITHOUT COMPLICATION, WITHOUT LONG-TERM CURRENT USE OF INSULIN (HCC): ICD-10-CM

## 2017-09-11 LAB
ALBUMIN SERPL BCP-MCNC: 4.1 G/DL (ref 3.2–4.9)
ALBUMIN/GLOB SERPL: 1.3 G/DL
ALP SERPL-CCNC: 35 U/L (ref 30–99)
ALT SERPL-CCNC: 12 U/L (ref 2–50)
ANION GAP SERPL CALC-SCNC: 10 MMOL/L (ref 0–11.9)
AST SERPL-CCNC: 16 U/L (ref 12–45)
BILIRUB SERPL-MCNC: 0.4 MG/DL (ref 0.1–1.5)
BUN SERPL-MCNC: 20 MG/DL (ref 8–22)
CALCIUM SERPL-MCNC: 9.9 MG/DL (ref 8.5–10.5)
CHLORIDE SERPL-SCNC: 104 MMOL/L (ref 96–112)
CO2 SERPL-SCNC: 23 MMOL/L (ref 20–33)
CREAT SERPL-MCNC: 0.5 MG/DL (ref 0.5–1.4)
EST. AVERAGE GLUCOSE BLD GHB EST-MCNC: 146 MG/DL
GFR SERPL CREATININE-BSD FRML MDRD: >60 ML/MIN/1.73 M 2
GLOBULIN SER CALC-MCNC: 3.2 G/DL (ref 1.9–3.5)
GLUCOSE SERPL-MCNC: 91 MG/DL (ref 65–99)
HBA1C MFR BLD: 6.7 % (ref 0–5.6)
POTASSIUM SERPL-SCNC: 4.1 MMOL/L (ref 3.6–5.5)
PROT SERPL-MCNC: 7.3 G/DL (ref 6–8.2)
SODIUM SERPL-SCNC: 137 MMOL/L (ref 135–145)

## 2017-09-11 PROCEDURE — 82306 VITAMIN D 25 HYDROXY: CPT

## 2017-09-11 PROCEDURE — 36415 COLL VENOUS BLD VENIPUNCTURE: CPT

## 2017-09-11 PROCEDURE — 83036 HEMOGLOBIN GLYCOSYLATED A1C: CPT | Mod: GA

## 2017-09-11 PROCEDURE — 80053 COMPREHEN METABOLIC PANEL: CPT

## 2017-09-12 LAB — 25(OH)D3 SERPL-MCNC: 33 NG/ML (ref 30–100)

## 2017-09-13 ENCOUNTER — TELEPHONE (OUTPATIENT)
Dept: MEDICAL GROUP | Facility: PHYSICIAN GROUP | Age: 72
End: 2017-09-13

## 2017-09-13 NOTE — TELEPHONE ENCOUNTER
----- Message from Khadijah Villagomez M.D. sent at 9/12/2017  5:13 PM PDT -----  Please advised patient I reviewed lab results. Blood glucose is elevated. Please schedule an office visit to discuss more  Khadijah Villagomez M.D.

## 2017-09-13 NOTE — TELEPHONE ENCOUNTER
Phone Number Called: [phone] (home)       Message: Pt states she has an appt with you in December, she wanted to know if you wanted to see her sooner than that, or if discussing her glucose level could wait until then.    Left Message for patient to call back: N\A

## 2017-10-18 ENCOUNTER — TELEPHONE (OUTPATIENT)
Dept: MEDICAL GROUP | Facility: PHYSICIAN GROUP | Age: 72
End: 2017-10-18

## 2017-10-18 NOTE — TELEPHONE ENCOUNTER
ESTABLISHED PATIENT PRE-VISIT PLANNING     Note: Patient will not be contacted if there is no indication to call.     1.  Reviewed notes from the last few office visits within the medical group: Yes    2.  If any orders were placed at last visit or intended to be done for this visit (i.e. 6 mos follow-up), do we have Results/Consult Notes?        •  Labs - Labs ordered, completed on 09/11/17 and results are in chart.   Note: If patient appointment is for lab review and patient did not complete labs,                check with provider if OK to reschedule patient until labs completed.       •  Imaging - Imaging ordered, completed and results are in chart.       •  Referrals - No referrals were ordered at last office visit.    3. Is this appointment scheduled as a Hospital Follow-Up? No    4.  Immunizations were updated in [employer] using WebIZ?: Yes       •  Web [employer] Recommendations: TDAP and ZOSTAVAX (Shingles)    5.  Patient is due for the following Health Maintenance Topics:   Health Maintenance Due   Topic Date Due   • IMM DTaP/Tdap/Td Vaccine (1 - Tdap) 10/12/1966   • IMM ZOSTER VACCINE  10/12/2007   • IMM INFLUENZA (1) 09/01/2017   • URINE ACR / MICROALBUMIN  10/15/2017       - Patient has completed FLU, PNEUMOVAX (PPSV23) and PREVNAR (PCV13)  Immunization(s) per WebIZ. Chart has been updated.      6.  Patient was NOT informed to arrive 15 min prior to their scheduled appointment and bring in their medication bottles.

## 2017-10-19 ENCOUNTER — OFFICE VISIT (OUTPATIENT)
Dept: MEDICAL GROUP | Facility: PHYSICIAN GROUP | Age: 72
End: 2017-10-19
Payer: MEDICARE

## 2017-10-19 VITALS
HEART RATE: 69 BPM | TEMPERATURE: 98.7 F | DIASTOLIC BLOOD PRESSURE: 72 MMHG | RESPIRATION RATE: 14 BRPM | SYSTOLIC BLOOD PRESSURE: 112 MMHG | WEIGHT: 135 LBS | HEIGHT: 62 IN | OXYGEN SATURATION: 97 % | BODY MASS INDEX: 24.84 KG/M2

## 2017-10-19 DIAGNOSIS — E55.9 VITAMIN D DEFICIENCY: ICD-10-CM

## 2017-10-19 DIAGNOSIS — Z23 NEED FOR VACCINATION: ICD-10-CM

## 2017-10-19 DIAGNOSIS — H90.6 MIXED CONDUCTIVE AND SENSORINEURAL HEARING LOSS OF BOTH EARS: ICD-10-CM

## 2017-10-19 DIAGNOSIS — E11.9 TYPE 2 DIABETES MELLITUS WITHOUT COMPLICATION, WITHOUT LONG-TERM CURRENT USE OF INSULIN (HCC): ICD-10-CM

## 2017-10-19 DIAGNOSIS — M81.0 AGE-RELATED OSTEOPOROSIS WITHOUT CURRENT PATHOLOGICAL FRACTURE: ICD-10-CM

## 2017-10-19 DIAGNOSIS — E78.00 HYPERCHOLESTEREMIA: ICD-10-CM

## 2017-10-19 DIAGNOSIS — I10 ESSENTIAL HYPERTENSION: ICD-10-CM

## 2017-10-19 PROCEDURE — 99214 OFFICE O/P EST MOD 30 MIN: CPT | Performed by: INTERNAL MEDICINE

## 2017-10-19 RX ORDER — METFORMIN HYDROCHLORIDE 500 MG/1
500 TABLET, EXTENDED RELEASE ORAL DAILY
Qty: 30 TAB | COMMUNITY
Start: 2017-10-19 | End: 2018-02-05 | Stop reason: SDUPTHER

## 2017-10-19 NOTE — ASSESSMENT & PLAN NOTE
Lipid panel 02/2017. She is on cholesterol meds. I advise not to stop, advised cholesterol start elevate

## 2017-10-19 NOTE — ASSESSMENT & PLAN NOTE
Blood pressure at home is slightly elevated by checking with automatic machine. Blood pressure well controlled on current regime in the clinic. She denies chest pain, shortness of breath, leg swelling, lightheadedness, headache. She is on lisinopril 10 mg daily. GFR, electrolytes within normal.

## 2017-10-19 NOTE — PROGRESS NOTES
Subjective:   Kacey Whelan is a 72 y.o. female here today for Follow-up of diabetes    HTN (hypertension)  Blood pressure at home is slightly elevated by checking with automatic machine. Blood pressure well controlled on current regime in the clinic. She denies chest pain, shortness of breath, leg swelling, lightheadedness, headache. She is on lisinopril 10 mg daily. GFR, electrolytes within normal.    Hearing loss  She got hearing aids. She states that she can hear better    Type 2 diabetes mellitus without complication, without long-term current use of insulin (CMS-[facility])  Patient used to be on metformin 500 mg daily. We discontinued to try if she is okay with just diet. A1c is 6.7. Lipid panel, on file. She is up-to-date on monofilament. She is ace, aspirin, statin. No complications. I advise to restart metformin     Hypercholesteremia  Lipid panel 02/2017. She is on cholesterol meds. I advise not to stop, advised cholesterol start elevate    Vitamin D deficiency  On supplement, properly replete. Last level 33 (09/2017)     Age-related osteoporosis without current pathological fracture  On fosamax since 02/2017.  DEXA scan 02/2017  No Pathological fracture       Current medicines (including changes today)  Current Outpatient Prescriptions   Medication Sig Dispense Refill   • Magnesium ([employer] TRIPLE MAGNESIUM COMPLEX) 400 MG Cap Take  by mouth.     • metformin ER (GLUCOPHAGE XR) 500 MG TABLET SR 24 HR Take 1 Tab by mouth every day. 30 Tab    • Zoster Vaccine Live (ZOSTAVAX) 57287 UNT/0.65ML Recon Susp Inject 0.65 mL as instructed Once for 1 dose. 0.65 mL 0   • tetanus-dipth-acell pertussis (TDAP) Suspension 0.5 mL by Intramuscular route Once PRN for up to 1 dose. 1 Vial 0   • lovastatin (MEVACOR) 10 MG tablet TAKE ONE TABLET BY MOUTH AT BEDTIME 90 Tab 3   • Calcium Citrate-Vitamin D (CALCIUM + D PO) Take  by mouth.     • lisinopril (PRINIVIL) 10 MG Tab Take 1 Tab by mouth every day. 90 Tab 3   • alendronate  (FOSAMAX) 70 MG Tab Take 1 Tab by mouth every 7 days. 12 Tab 3   • therapeutic multivitamin-minerals (THERAGRAN-M) Tab Take 1 Tab by mouth every day.     • Blood Glucose Monitoring Suppl SUPPLIES Misc Test strips order: Test strips for Meter that's covered by insurance. Sig: use tid and prn ssx high or low sugar #100 RF x 3 100 Strip 11   • Lancets Misc Lancets order: Lancets  Sig: use bid and prn ssx high or low sugar. #100 RF x 3 100 Each 11   • Blood Glucose Monitoring Suppl Device Meter: Dispense Device of Insurance Preference. Sig. Use as directed for blood sugar monitoring. #1. NR. 1 Device 1   • aspirin 81 MG tablet Take 1 Tab by mouth every day. 100 Tab 3     No current facility-administered medications for this visit.      She  has a past medical history of Diabetes (CMS-[facility]); Hearing loss; Hyperlipidemia; and Hypertension.    Current Outpatient Prescriptions   Medication Sig Dispense Refill   • Magnesium ([employer] TRIPLE MAGNESIUM COMPLEX) 400 MG Cap Take  by mouth.     • metformin ER (GLUCOPHAGE XR) 500 MG TABLET SR 24 HR Take 1 Tab by mouth every day. 30 Tab    • Zoster Vaccine Live (ZOSTAVAX) 65994 UNT/0.65ML Recon Susp Inject 0.65 mL as instructed Once for 1 dose. 0.65 mL 0   • tetanus-dipth-acell pertussis (TDAP) Suspension 0.5 mL by Intramuscular route Once PRN for up to 1 dose. 1 Vial 0   • lovastatin (MEVACOR) 10 MG tablet TAKE ONE TABLET BY MOUTH AT BEDTIME 90 Tab 3   • Calcium Citrate-Vitamin D (CALCIUM + D PO) Take  by mouth.     • lisinopril (PRINIVIL) 10 MG Tab Take 1 Tab by mouth every day. 90 Tab 3   • alendronate (FOSAMAX) 70 MG Tab Take 1 Tab by mouth every 7 days. 12 Tab 3   • therapeutic multivitamin-minerals (THERAGRAN-M) Tab Take 1 Tab by mouth every day.     • Blood Glucose Monitoring Suppl SUPPLIES Misc Test strips order: Test strips for Meter that's covered by insurance. Sig: use tid and prn ssx high or low sugar #100 RF x 3 100 Strip 11   • Lancets Misc Lancets order: Lancets  Sig: use  "bid and prn ssx high or low sugar. #100 RF x 3 100 Each 11   • Blood Glucose Monitoring Suppl Device Meter: Dispense Device of Insurance Preference. Sig. Use as directed for blood sugar monitoring. #1. NR. 1 Device 1   • aspirin 81 MG tablet Take 1 Tab by mouth every day. 100 Tab 3     No current facility-administered medications for this visit.        Allergies as of 10/19/2017 - Reviewed 10/19/2017   Allergen Reaction Noted   • Latex  03/06/2015       Social History     Social History   • Marital status:      Spouse name: N/A   • Number of children: N/A   • Years of education: N/A     Occupational History   • Not on file.     Social History Main Topics   • Smoking status: Never Smoker   • Smokeless tobacco: Never Used   • Alcohol use No   • Drug use: No   • Sexual activity: No      Comment:      Other Topics Concern   • Not on file     Social History Narrative   • No narrative on file        Family History   Problem Relation Age of Onset   • Cancer Mother      breast cancer   • Heart Disease Father 46     MI   • Heart Disease Sister      2 heart surgery   • Diabetes Brother    • Cancer Brother      brain tumor       Past Surgical History:   Procedure Laterality Date   • PRIMARY C SECTION      3 c section   • TONSILLECTOMY         ROS   No chest pain, no shortness of breath, no abdominal pain       Objective:     Blood pressure 112/72, pulse 69, temperature 37.1 °C (98.7 °F), resp. rate 14, height 1.575 m (5' 2\"), weight 61.2 kg (135 lb), SpO2 97 %, not currently breastfeeding. Body mass index is 24.69 kg/m².   Physical Exam:  Constitutional: Alert, no distress.  Skin: Warm, dry, good turgor, no rashes in visible areas.  Eye: Equal, round and reactive, conjunctiva clear, lids normal.  ENMT: Lips without lesions, good dentition, oropharynx clear.  Neck: Trachea midline, no masses, no thyromegaly. No cervical or supraclavicular lymphadenopathy  Respiratory: Unlabored respiratory effort, lungs clear to " auscultation, no wheezes, no ronchi.  Cardiovascular: Normal S1, S2, no murmur, no edema.  Abdomen: Soft, non-tender, no masses, no hepatosplenomegaly.  Psych: Alert and oriented x3, normal affect and mood.        Assessment and Plan:   The following treatment plan was discussed    1. Type 2 diabetes mellitus without complication, without long-term current use of insulin (CMS-[facility])  restart metformin. Continue to monitor.  - metformin ER (GLUCOPHAGE XR) 500 MG TABLET SR 24 HR; Take 1 Tab by mouth every day.  Dispense: 30 Tab  - COMP METABOLIC PANEL; Future  - HEMOGLOBIN A1C; Future  - LIPID PROFILE; Future  - MICROALBUMIN CREAT RATIO URINE (LAB COLLECT); Future    2. Vitamin D deficiency  Continue to monitor. Continue supplement  - VITAMIN D,25 HYDROXY; Future    3. Essential hypertension  Continue to monitor. Continue same medications  - COMP METABOLIC PANEL; Future    4. Need for vaccination  Prescription provided  - Zoster Vaccine Live (ZOSTAVAX) 92543 UNT/0.65ML Recon Susp; Inject 0.65 mL as instructed Once for 1 dose.  Dispense: 0.65 mL; Refill: 0  - tetanus-dipth-acell pertussis (TDAP) Suspension; 0.5 mL by Intramuscular route Once PRN for up to 1 dose.  Dispense: 1 Vial; Refill: 0    5. Mixed conductive and sensorineural hearing loss of both ears  Follow-up with audiology    6. Age-related osteoporosis without current pathological fracture  Continue same treatment. Repeat DEXA scan in 2 years (2019)    7. Hypercholesteremia  Continue same treatment. Continue to monitor      Followup: Return in about 6 months (around 4/19/2018) for Short.

## 2018-02-05 DIAGNOSIS — E11.9 TYPE 2 DIABETES MELLITUS WITHOUT COMPLICATION, WITHOUT LONG-TERM CURRENT USE OF INSULIN (HCC): ICD-10-CM

## 2018-02-06 RX ORDER — METFORMIN HYDROCHLORIDE 500 MG/1
500 TABLET, EXTENDED RELEASE ORAL DAILY
Qty: 90 TAB | Refills: 1 | Status: SHIPPED | OUTPATIENT
Start: 2018-02-06 | End: 2018-07-24 | Stop reason: SDUPTHER

## 2018-02-12 RX ORDER — ALENDRONATE SODIUM 70 MG/1
TABLET ORAL
Qty: 12 TAB | Refills: 3 | Status: SHIPPED | OUTPATIENT
Start: 2018-02-12 | End: 2018-05-22

## 2018-02-13 RX ORDER — ALENDRONATE SODIUM 70 MG/1
TABLET ORAL
Qty: 12 TAB | Refills: 3 | Status: SHIPPED | OUTPATIENT
Start: 2018-02-13 | End: 2018-05-22

## 2018-05-14 ENCOUNTER — HOSPITAL ENCOUNTER (OUTPATIENT)
Dept: LAB | Facility: MEDICAL CENTER | Age: 73
End: 2018-05-14
Attending: INTERNAL MEDICINE
Payer: MEDICARE

## 2018-05-14 DIAGNOSIS — E55.9 VITAMIN D DEFICIENCY: ICD-10-CM

## 2018-05-14 DIAGNOSIS — I10 ESSENTIAL HYPERTENSION: ICD-10-CM

## 2018-05-14 DIAGNOSIS — E11.9 TYPE 2 DIABETES MELLITUS WITHOUT COMPLICATION, WITHOUT LONG-TERM CURRENT USE OF INSULIN (HCC): ICD-10-CM

## 2018-05-14 LAB
25(OH)D3 SERPL-MCNC: 28 NG/ML (ref 30–100)
ALBUMIN SERPL BCP-MCNC: 3.8 G/DL (ref 3.2–4.9)
ALBUMIN/GLOB SERPL: 1.2 G/DL
ALP SERPL-CCNC: 27 U/L (ref 30–99)
ALT SERPL-CCNC: 10 U/L (ref 2–50)
ANION GAP SERPL CALC-SCNC: 10 MMOL/L (ref 0–11.9)
AST SERPL-CCNC: 14 U/L (ref 12–45)
BILIRUB SERPL-MCNC: 0.4 MG/DL (ref 0.1–1.5)
BUN SERPL-MCNC: 16 MG/DL (ref 8–22)
CALCIUM SERPL-MCNC: 9 MG/DL (ref 8.5–10.5)
CHLORIDE SERPL-SCNC: 105 MMOL/L (ref 96–112)
CHOLEST SERPL-MCNC: 150 MG/DL (ref 100–199)
CO2 SERPL-SCNC: 22 MMOL/L (ref 20–33)
CREAT SERPL-MCNC: 0.67 MG/DL (ref 0.5–1.4)
CREAT UR-MCNC: 42 MG/DL
EST. AVERAGE GLUCOSE BLD GHB EST-MCNC: 137 MG/DL
GLOBULIN SER CALC-MCNC: 3.2 G/DL (ref 1.9–3.5)
GLUCOSE SERPL-MCNC: 132 MG/DL (ref 65–99)
HBA1C MFR BLD: 6.4 % (ref 0–5.6)
HDLC SERPL-MCNC: 58 MG/DL
LDLC SERPL CALC-MCNC: 81 MG/DL
MICROALBUMIN UR-MCNC: 2 MG/DL
MICROALBUMIN/CREAT UR: 48 MG/G (ref 0–30)
POTASSIUM SERPL-SCNC: 4.5 MMOL/L (ref 3.6–5.5)
PROT SERPL-MCNC: 7 G/DL (ref 6–8.2)
SODIUM SERPL-SCNC: 137 MMOL/L (ref 135–145)
TRIGL SERPL-MCNC: 54 MG/DL (ref 0–149)

## 2018-05-14 PROCEDURE — 80061 LIPID PANEL: CPT

## 2018-05-14 PROCEDURE — 82570 ASSAY OF URINE CREATININE: CPT

## 2018-05-14 PROCEDURE — 80053 COMPREHEN METABOLIC PANEL: CPT

## 2018-05-14 PROCEDURE — 82043 UR ALBUMIN QUANTITATIVE: CPT

## 2018-05-14 PROCEDURE — 83036 HEMOGLOBIN GLYCOSYLATED A1C: CPT | Mod: GA

## 2018-05-14 PROCEDURE — 36415 COLL VENOUS BLD VENIPUNCTURE: CPT

## 2018-05-14 PROCEDURE — 82306 VITAMIN D 25 HYDROXY: CPT

## 2018-05-17 NOTE — PROGRESS NOTES
Jose Erazo,   labs look good. We can discuss at your next appointment.  Nader,  Khadijah Villagomez M.D.

## 2018-05-22 ENCOUNTER — OFFICE VISIT (OUTPATIENT)
Dept: MEDICAL GROUP | Facility: PHYSICIAN GROUP | Age: 73
End: 2018-05-22
Payer: MEDICARE

## 2018-05-22 VITALS
HEART RATE: 64 BPM | HEIGHT: 62 IN | SYSTOLIC BLOOD PRESSURE: 110 MMHG | WEIGHT: 126.32 LBS | BODY MASS INDEX: 23.25 KG/M2 | OXYGEN SATURATION: 96 % | DIASTOLIC BLOOD PRESSURE: 66 MMHG | TEMPERATURE: 98.6 F | RESPIRATION RATE: 16 BRPM

## 2018-05-22 DIAGNOSIS — E78.00 HYPERCHOLESTEREMIA: ICD-10-CM

## 2018-05-22 DIAGNOSIS — I10 ESSENTIAL HYPERTENSION: ICD-10-CM

## 2018-05-22 DIAGNOSIS — M81.0 AGE-RELATED OSTEOPOROSIS WITHOUT CURRENT PATHOLOGICAL FRACTURE: ICD-10-CM

## 2018-05-22 DIAGNOSIS — E11.9 TYPE 2 DIABETES MELLITUS WITHOUT COMPLICATION, WITHOUT LONG-TERM CURRENT USE OF INSULIN (HCC): ICD-10-CM

## 2018-05-22 DIAGNOSIS — E55.9 VITAMIN D DEFICIENCY: ICD-10-CM

## 2018-05-22 PROCEDURE — 99214 OFFICE O/P EST MOD 30 MIN: CPT | Performed by: INTERNAL MEDICINE

## 2018-05-22 ASSESSMENT — PAIN SCALES - GENERAL: PAINLEVEL: NO PAIN

## 2018-05-22 ASSESSMENT — PATIENT HEALTH QUESTIONNAIRE - PHQ9: CLINICAL INTERPRETATION OF PHQ2 SCORE: 0

## 2018-05-22 NOTE — ASSESSMENT & PLAN NOTE
Last lipid panel 05/2018. Primary prevention., tolerating current regime well., continue to monitor

## 2018-05-22 NOTE — ASSESSMENT & PLAN NOTE
She is on metformin 500 mg daily. Last a1c 6.7. No complications. She tells me that she is up to date with lab work. She is on statin, ace, asa.   Monofilament testing with a 10 gram force: sensation intact: intact bilaterally  Visual Inspection: Feet without maceration, ulcers, fissures.  Pedal pulses: intact bilaterally

## 2018-05-22 NOTE — ASSESSMENT & PLAN NOTE
BP well managed on lisinopril 10 ,mg daily. She denies headache, chest pain, sob, leg swelling, lightheaded, sob, palpitations

## 2018-05-22 NOTE — ASSESSMENT & PLAN NOTE
She is on supplement, last lab work 05/2018, last lab work 05/2018. I advise to take vitamin D supplement 2000 U daily. Continue to monitor

## 2018-05-22 NOTE — ASSESSMENT & PLAN NOTE
She is noticing worsen acid reflux. She wants to stop fosamax. DEXA 02/2017. No pathological fracture   I advise vitamin d supplement, and weight baring walking. Continue to monitor

## 2018-05-24 RX ORDER — LISINOPRIL 10 MG/1
TABLET ORAL
Qty: 90 TAB | Refills: 3 | Status: SHIPPED | OUTPATIENT
Start: 2018-05-24 | End: 2019-04-05 | Stop reason: SDUPTHER

## 2018-05-25 NOTE — PROGRESS NOTES
Subjective:   Kacey Whelan is a 72 y.o. female here today for lab work reviewed, osteoporosis, diabetes, follow up     HTN (hypertension)  BP well managed on lisinopril 10 ,mg daily. She denies headache, chest pain, sob, leg swelling, lightheaded, sob, palpitations     Type 2 diabetes mellitus without complication, without long-term current use of insulin (CMS-[facility])  She is on metformin 500 mg daily. Last a1c 6.7. No complications. She tells me that she is up to date with lab work. She is on statin, ace, asa.   Monofilament testing with a 10 gram force: sensation intact: intact bilaterally  Visual Inspection: Feet without maceration, ulcers, fissures.  Pedal pulses: intact bilaterally      Hypercholesteremia  Last lipid panel 05/2018. Primary prevention., tolerating current regime well., continue to monitor    Vitamin D deficiency  She is on supplement, last lab work 05/2018, last lab work 05/2018. I advise to take vitamin D supplement 2000 U daily. Continue to monitor     Age-related osteoporosis without current pathological fracture  She is noticing worsen acid reflux. She wants to stop fosamax. DEXA 02/2017. No pathological fracture   I advise vitamin d supplement, and weight baring walking. Continue to monitor       Current medicines (including changes today)  Current Outpatient Prescriptions   Medication Sig Dispense Refill   • metFORMIN ER (GLUCOPHAGE XR) 500 MG TABLET SR 24 HR Take 1 Tab by mouth every day. 90 Tab 1   • Magnesium ([employer] TRIPLE MAGNESIUM COMPLEX) 400 MG Cap Take  by mouth.     • lovastatin (MEVACOR) 10 MG tablet TAKE ONE TABLET BY MOUTH AT BEDTIME 90 Tab 3   • Calcium Citrate-Vitamin D (CALCIUM + D PO) Take  by mouth.     • therapeutic multivitamin-minerals (THERAGRAN-M) Tab Take 1 Tab by mouth every day.     • Blood Glucose Monitoring Suppl SUPPLIES Misc Test strips order: Test strips for Meter that's covered by insurance. Sig: use tid and prn ssx high or low sugar #100 RF x 3 100 Strip 11    • Lancets Misc Lancets order: Lancets  Sig: use bid and prn ssx high or low sugar. #100 RF x 3 100 Each 11   • Blood Glucose Monitoring Suppl Device Meter: Dispense Device of Insurance Preference. Sig. Use as directed for blood sugar monitoring. #1. NR. 1 Device 1   • aspirin 81 MG tablet Take 1 Tab by mouth every day. 100 Tab 3   • lisinopril (PRINIVIL) 10 MG Tab TAKE ONE TABLET BY MOUTH ONCE DAILY 90 Tab 3     No current facility-administered medications for this visit.      She  has a past medical history of Diabetes (HCC); Hearing loss; Hyperlipidemia; and Hypertension.    Current Outpatient Prescriptions   Medication Sig Dispense Refill   • metFORMIN ER (GLUCOPHAGE XR) 500 MG TABLET SR 24 HR Take 1 Tab by mouth every day. 90 Tab 1   • Magnesium ([employer]S TRIPLE MAGNESIUM COMPLEX) 400 MG Cap Take  by mouth.     • lovastatin (MEVACOR) 10 MG tablet TAKE ONE TABLET BY MOUTH AT BEDTIME 90 Tab 3   • Calcium Citrate-Vitamin D (CALCIUM + D PO) Take  by mouth.     • therapeutic multivitamin-minerals (THERAGRAN-M) Tab Take 1 Tab by mouth every day.     • Blood Glucose Monitoring Suppl SUPPLIES Misc Test strips order: Test strips for Meter that's covered by insurance. Sig: use tid and prn ssx high or low sugar #100 RF x 3 100 Strip 11   • Lancets Misc Lancets order: Lancets  Sig: use bid and prn ssx high or low sugar. #100 RF x 3 100 Each 11   • Blood Glucose Monitoring Suppl Device Meter: Dispense Device of Insurance Preference. Sig. Use as directed for blood sugar monitoring. #1. NR. 1 Device 1   • aspirin 81 MG tablet Take 1 Tab by mouth every day. 100 Tab 3   • lisinopril (PRINIVIL) 10 MG Tab TAKE ONE TABLET BY MOUTH ONCE DAILY 90 Tab 3     No current facility-administered medications for this visit.        Allergies as of 05/22/2018 - Reviewed 05/22/2018   Allergen Reaction Noted   • Latex  03/06/2015       Social History     Social History   • Marital status:      Spouse name: N/A   • Number of children: N/A   •  "Years of education: N/A     Occupational History   • Not on file.     Social History Main Topics   • Smoking status: Never Smoker   • Smokeless tobacco: Never Used   • Alcohol use No   • Drug use: No   • Sexual activity: No      Comment:      Other Topics Concern   • Not on file     Social History Narrative   • No narrative on file        Family History   Problem Relation Age of Onset   • Cancer Mother      breast cancer   • Heart Disease Father 46     MI   • Heart Disease Sister      2 heart surgery   • Diabetes Brother    • Cancer Brother      brain tumor       Past Surgical History:   Procedure Laterality Date   • PRIMARY C SECTION      3 c section   • TONSILLECTOMY         ROS   All systems reviewed are negative except for HPI       Objective:     Blood pressure 110/66, pulse 64, temperature 37 °C (98.6 °F), resp. rate 16, height 1.575 m (5' 2\"), weight 57.3 kg (126 lb 5.2 oz), SpO2 96 %, not currently breastfeeding. Body mass index is 23.1 kg/m².   Physical Exam:  Constitutional: Alert, no distress.  Skin: Warm, dry, good turgor, no rashes in visible areas.  Eye: Equal, round and reactive, conjunctiva clear, lids normal.  ENMT: Lips without lesions, good dentition, oropharynx clear.  Neck: Trachea midline, no masses, no thyromegaly. No cervical or supraclavicular lymphadenopathy  Respiratory: Unlabored respiratory effort, lungs clear to auscultation, no wheezes, no ronchi.  Cardiovascular: Normal S1, S2, no murmur, no edema.  Abdomen: Soft, non-tender, no masses, no hepatosplenomegaly.  Psych: Alert and oriented x3, normal affect and mood.        Assessment and Plan:   The following treatment plan was discussed    1. Type 2 diabetes mellitus without complication, without long-term current use of insulin (HCC)  Continue same treatment. Continue to monitor  - COMP METABOLIC PANEL; Future  - HEMOGLOBIN A1C; Future  - CBC WITH DIFFERENTIAL; Future  - LIPID PROFILE; Future  - MICROALBUMIN CREAT RATIO URINE; " Future  - Diabetic Monofilament Lower Extremity Exam    2. Hypercholesteremia  Continue same treatment. Continue to monitor    3. Essential hypertension  Continue same treatment. Continue to monitor    4. Age-related osteoporosis without current pathological fracture  Stop fosamax. Pt symptomatic with acid reflux. She is not interested on injectable form treatment at this time. I will repeat DEXA scan and reevaluate. Continue to monitor . continue weight baring exercise   - CBC WITH DIFFERENTIAL; Future    5. Vitamin D deficiency  Continue supplement, continue to monitor   - VITAMIN D,25 HYDROXY; Future      Followup: Return in about 6 months (around 11/22/2018), or if symptoms worsen or fail to improve, for Short.

## 2018-07-24 DIAGNOSIS — E11.9 TYPE 2 DIABETES MELLITUS WITHOUT COMPLICATION, WITHOUT LONG-TERM CURRENT USE OF INSULIN (HCC): ICD-10-CM

## 2018-07-24 RX ORDER — METFORMIN HYDROCHLORIDE 500 MG/1
TABLET, EXTENDED RELEASE ORAL
Qty: 90 TAB | Refills: 1 | Status: SHIPPED | OUTPATIENT
Start: 2018-07-24 | End: 2019-01-28 | Stop reason: SDUPTHER

## 2018-08-22 ENCOUNTER — PATIENT OUTREACH (OUTPATIENT)
Dept: HEALTH INFORMATION MANAGEMENT | Facility: OTHER | Age: 73
End: 2018-08-22

## 2018-08-22 NOTE — PROGRESS NOTES
Outcome: Left Message    Please transfer to Patient Outreach Team at [phone] when patient returns call.    WebIZ Checked & Epic Updated:  yes    HealthConnect Verified: no    Attempt # 1

## 2018-08-22 NOTE — PROGRESS NOTES
1. Attempt #:1    2. WebIZ Checked & Epic Updated: Yes  3. HealthConnect Verified: no  4. Verify PCP: yes    5. Communication Preference Obtained: yes    6. Diabetes Visit Scheduling  Scheduling Status:Scheduled      7. Care Gap Scheduling (Attempt to Schedule EACH Overdue Care Gap!)    Health Maintenance Due   Topic Date Due   • IMM HEP B VACCINE (1 of 3 - Risk 3-dose series) 10/12/1966   • IMM DTaP/Tdap/Td Vaccine (1 - Tdap) 10/12/1966   • COLONOSCOPY  10/12/1997   • IMM ZOSTER VACCINES (1 of 2) 10/12/1997   • RETINAL SCREENING  11/30/2017   • Annual Wellness Visit  12/16/2017   • IMM INFLUENZA (1) 09/01/2018        8. Patient was directed to Health and Wellness Website: no     - Patient already has appointment scheduled for Retinal Eye Exam.    9. Screened for [employer] Prescription? yes  10. [employer] Activation: already active  11. [employer] Brianda: no  12. Virtual Visits: no  13. Opt In to Text Messages: no

## 2018-09-06 ENCOUNTER — PATIENT OUTREACH (OUTPATIENT)
Dept: HEALTH INFORMATION MANAGEMENT | Facility: OTHER | Age: 73
End: 2018-09-06

## 2018-09-06 NOTE — PROGRESS NOTES
1. Attempt #:1 wants to talk about colonoscopy with pcp    2. HealthConnect Verified: no    3. Verify PCP: yes    4. Review Care Team: yes    5. WebIZ Checked & Epic Updated: Yes   Influenza w/preserv.   Tdap   Zoster Recombinant   WebIZ Recommendations: FLU, TDAP and SHINGRIX (Shingles)  · Is patient due for Tdap? YES. Patient was notified of copay/out of pocket cost.  · Is patient due for Shingles? YES. Patient was notified of copay/out of pocket cost.    6. Communication Preference Obtained: yes    7. Annual Wellness Visit Scheduling  · Scheduling Status:Scheduled     9. Care Gap Scheduling (Attempt to Schedule EACH Overdue Care Gap!)     Health Maintenance Due   Topic Date Due   • IMM HEP B VACCINE (1 of 3 - Risk 3-dose series) 10/12/1966   • IMM DTaP/Tdap/Td Vaccine (1 - Tdap) 10/12/1966   • COLONOSCOPY  10/12/1997   • IMM ZOSTER VACCINES (1 of 2) 10/12/1997   • RETINAL SCREENING  11/30/2017   • Annual Wellness Visit  12/16/2017   • IMM INFLUENZA (1) 09/01/2018        Scheduled patient for Annual Wellness Visit and Immunizations: FLU     10. [employer] Activation: already active    11. [employer] Brianda: no    12. Virtual Visits: no    13. Opt In to Text Messages: yes

## 2018-09-24 ENCOUNTER — TELEPHONE (OUTPATIENT)
Dept: MEDICAL GROUP | Facility: PHYSICIAN GROUP | Age: 73
End: 2018-09-24

## 2018-09-24 NOTE — TELEPHONE ENCOUNTER
Left message for patient to call back regarding pre-visit planning. Please transfer call to [facility] at [phone].       PVP WITH OUTREACH  Future Appointments       Provider Department [place]    10/1/2018 3:20 PM Khadijah Villagomez M.D.; [facility]  [facility]     10/22/2018 7:00 AM LAB [facility]     11/2/2018 1:40 PM Khadijah Villagomez M.D. [facility]           ANNUAL WELLNESS VISIT PRE-VISIT PLANNING     1.  Immunizations were updated in Epic using WebIZ?: Epic matches WebIZ       •  WebIZ Recommendations: FLU, TDAP and SHINGRIX (Shingles)       •  Is patient due for Tdap? NO       •  Is patient due for Shingles? NO     2.  Specialty Comments was updated with diagnosis information provided by [facility]: NO

## 2018-10-01 ENCOUNTER — OFFICE VISIT (OUTPATIENT)
Dept: MEDICAL GROUP | Facility: PHYSICIAN GROUP | Age: 73
End: 2018-10-01
Payer: MEDICARE

## 2018-10-01 VITALS
SYSTOLIC BLOOD PRESSURE: 122 MMHG | BODY MASS INDEX: 22.45 KG/M2 | WEIGHT: 122 LBS | TEMPERATURE: 98.1 F | HEIGHT: 62 IN | DIASTOLIC BLOOD PRESSURE: 80 MMHG | HEART RATE: 72 BPM | RESPIRATION RATE: 16 BRPM | OXYGEN SATURATION: 95 %

## 2018-10-01 DIAGNOSIS — Z23 NEED FOR VACCINATION: ICD-10-CM

## 2018-10-01 DIAGNOSIS — I10 ESSENTIAL HYPERTENSION: ICD-10-CM

## 2018-10-01 DIAGNOSIS — M81.0 AGE-RELATED OSTEOPOROSIS WITHOUT CURRENT PATHOLOGICAL FRACTURE: ICD-10-CM

## 2018-10-01 DIAGNOSIS — I35.0 AORTIC VALVE STENOSIS, ETIOLOGY OF CARDIAC VALVE DISEASE UNSPECIFIED: ICD-10-CM

## 2018-10-01 DIAGNOSIS — E55.9 VITAMIN D DEFICIENCY: ICD-10-CM

## 2018-10-01 DIAGNOSIS — Z00.00 MEDICARE ANNUAL WELLNESS VISIT, SUBSEQUENT: Primary | ICD-10-CM

## 2018-10-01 DIAGNOSIS — E11.9 TYPE 2 DIABETES MELLITUS WITHOUT COMPLICATION, WITHOUT LONG-TERM CURRENT USE OF INSULIN (HCC): ICD-10-CM

## 2018-10-01 DIAGNOSIS — E78.00 HYPERCHOLESTEREMIA: ICD-10-CM

## 2018-10-01 DIAGNOSIS — H90.6 MIXED CONDUCTIVE AND SENSORINEURAL HEARING LOSS OF BOTH EARS: ICD-10-CM

## 2018-10-01 PROCEDURE — 90662 IIV NO PRSV INCREASED AG IM: CPT | Performed by: INTERNAL MEDICINE

## 2018-10-01 PROCEDURE — G0439 PPPS, SUBSEQ VISIT: HCPCS | Mod: 25 | Performed by: INTERNAL MEDICINE

## 2018-10-01 PROCEDURE — G0008 ADMIN INFLUENZA VIRUS VAC: HCPCS | Performed by: INTERNAL MEDICINE

## 2018-10-01 ASSESSMENT — PATIENT HEALTH QUESTIONNAIRE - PHQ9
5. POOR APPETITE OR OVEREATING: 0 - NOT AT ALL
CLINICAL INTERPRETATION OF PHQ2 SCORE: 1
SUM OF ALL RESPONSES TO PHQ QUESTIONS 1-9: 1

## 2018-10-01 ASSESSMENT — ACTIVITIES OF DAILY LIVING (ADL): BATHING_REQUIRES_ASSISTANCE: 0

## 2018-10-01 ASSESSMENT — ENCOUNTER SYMPTOMS: GENERAL WELL-BEING: GOOD

## 2018-10-01 NOTE — ASSESSMENT & PLAN NOTE
On statin for primary prevention. Last lab 05/2018. No side effects from statin. Continue to monitor

## 2018-10-01 NOTE — ASSESSMENT & PLAN NOTE
She is on metformin 500 mg daily. Last a1c 6.4. No complications.She is on statin, ace, asa.   She is up to date with testing. Not able to perform retinal screening today. I advise to follow up with optometrist for continue to monitor

## 2018-10-01 NOTE — ASSESSMENT & PLAN NOTE
She cannot tolerate bisphosphonate due to worsening of acid reflux. Last DEXA scan 02/2017. Continue vitamin d supplement, continue to monitor

## 2018-10-23 ENCOUNTER — HOSPITAL ENCOUNTER (OUTPATIENT)
Dept: LAB | Facility: MEDICAL CENTER | Age: 73
End: 2018-10-23
Attending: INTERNAL MEDICINE
Payer: MEDICARE

## 2018-10-23 DIAGNOSIS — E55.9 VITAMIN D DEFICIENCY: ICD-10-CM

## 2018-10-23 DIAGNOSIS — M81.0 AGE-RELATED OSTEOPOROSIS WITHOUT CURRENT PATHOLOGICAL FRACTURE: ICD-10-CM

## 2018-10-23 DIAGNOSIS — E11.9 TYPE 2 DIABETES MELLITUS WITHOUT COMPLICATION, WITHOUT LONG-TERM CURRENT USE OF INSULIN (HCC): ICD-10-CM

## 2018-10-23 LAB
ALBUMIN SERPL BCP-MCNC: 4.4 G/DL (ref 3.2–4.9)
ALBUMIN/GLOB SERPL: 1.5 G/DL
ALP SERPL-CCNC: 34 U/L (ref 30–99)
ALT SERPL-CCNC: 12 U/L (ref 2–50)
ANION GAP SERPL CALC-SCNC: 8 MMOL/L (ref 0–11.9)
AST SERPL-CCNC: 17 U/L (ref 12–45)
BASOPHILS # BLD AUTO: 1.2 % (ref 0–1.8)
BASOPHILS # BLD: 0.07 K/UL (ref 0–0.12)
BILIRUB SERPL-MCNC: 0.5 MG/DL (ref 0.1–1.5)
BUN SERPL-MCNC: 13 MG/DL (ref 8–22)
CALCIUM SERPL-MCNC: 9.6 MG/DL (ref 8.5–10.5)
CHLORIDE SERPL-SCNC: 103 MMOL/L (ref 96–112)
CHOLEST SERPL-MCNC: 183 MG/DL (ref 100–199)
CO2 SERPL-SCNC: 26 MMOL/L (ref 20–33)
CREAT SERPL-MCNC: 0.68 MG/DL (ref 0.5–1.4)
CREAT UR-MCNC: 61.7 MG/DL
EOSINOPHIL # BLD AUTO: 0.12 K/UL (ref 0–0.51)
EOSINOPHIL NFR BLD: 2 % (ref 0–6.9)
ERYTHROCYTE [DISTWIDTH] IN BLOOD BY AUTOMATED COUNT: 43.1 FL (ref 35.9–50)
EST. AVERAGE GLUCOSE BLD GHB EST-MCNC: 137 MG/DL
GLOBULIN SER CALC-MCNC: 3 G/DL (ref 1.9–3.5)
GLUCOSE SERPL-MCNC: 104 MG/DL (ref 65–99)
HBA1C MFR BLD: 6.4 % (ref 0–5.6)
HCT VFR BLD AUTO: 42.3 % (ref 37–47)
HDLC SERPL-MCNC: 78 MG/DL
HGB BLD-MCNC: 13.9 G/DL (ref 12–16)
IMM GRANULOCYTES # BLD AUTO: 0.01 K/UL (ref 0–0.11)
IMM GRANULOCYTES NFR BLD AUTO: 0.2 % (ref 0–0.9)
LDLC SERPL CALC-MCNC: 93 MG/DL
LYMPHOCYTES # BLD AUTO: 2.19 K/UL (ref 1–4.8)
LYMPHOCYTES NFR BLD: 37.4 % (ref 22–41)
MCH RBC QN AUTO: 30.3 PG (ref 27–33)
MCHC RBC AUTO-ENTMCNC: 32.9 G/DL (ref 33.6–35)
MCV RBC AUTO: 92.4 FL (ref 81.4–97.8)
MICROALBUMIN UR-MCNC: 2.2 MG/DL
MICROALBUMIN/CREAT UR: 36 MG/G (ref 0–30)
MONOCYTES # BLD AUTO: 0.42 K/UL (ref 0–0.85)
MONOCYTES NFR BLD AUTO: 7.2 % (ref 0–13.4)
NEUTROPHILS # BLD AUTO: 3.05 K/UL (ref 2–7.15)
NEUTROPHILS NFR BLD: 52 % (ref 44–72)
NRBC # BLD AUTO: 0 K/UL
NRBC BLD-RTO: 0 /100 WBC
PLATELET # BLD AUTO: 243 K/UL (ref 164–446)
PMV BLD AUTO: 8.4 FL (ref 9–12.9)
POTASSIUM SERPL-SCNC: 4 MMOL/L (ref 3.6–5.5)
PROT SERPL-MCNC: 7.4 G/DL (ref 6–8.2)
RBC # BLD AUTO: 4.58 M/UL (ref 4.2–5.4)
SODIUM SERPL-SCNC: 137 MMOL/L (ref 135–145)
TRIGL SERPL-MCNC: 59 MG/DL (ref 0–149)
WBC # BLD AUTO: 5.9 K/UL (ref 4.8–10.8)

## 2018-10-23 PROCEDURE — 82043 UR ALBUMIN QUANTITATIVE: CPT

## 2018-10-23 PROCEDURE — 82570 ASSAY OF URINE CREATININE: CPT

## 2018-10-23 PROCEDURE — 82306 VITAMIN D 25 HYDROXY: CPT

## 2018-10-23 PROCEDURE — 85025 COMPLETE CBC W/AUTO DIFF WBC: CPT

## 2018-10-23 PROCEDURE — 80053 COMPREHEN METABOLIC PANEL: CPT

## 2018-10-23 PROCEDURE — 83036 HEMOGLOBIN GLYCOSYLATED A1C: CPT | Mod: GA

## 2018-10-23 PROCEDURE — 80061 LIPID PANEL: CPT

## 2018-10-23 PROCEDURE — 36415 COLL VENOUS BLD VENIPUNCTURE: CPT

## 2018-10-24 LAB — 25(OH)D3 SERPL-MCNC: 30 NG/ML (ref 30–100)

## 2018-11-05 RX ORDER — LOVASTATIN 10 MG/1
10 TABLET ORAL DAILY
Qty: 90 TAB | Refills: 0 | Status: SHIPPED | OUTPATIENT
Start: 2018-11-05 | End: 2018-11-05 | Stop reason: SDUPTHER

## 2018-11-05 RX ORDER — LOVASTATIN 10 MG/1
TABLET ORAL
Qty: 90 TAB | Refills: 3 | OUTPATIENT
Start: 2018-11-05

## 2018-11-05 RX ORDER — LOVASTATIN 10 MG/1
10 TABLET ORAL DAILY
Qty: 90 TAB | Refills: 3 | Status: SHIPPED | OUTPATIENT
Start: 2018-11-05 | End: 2019-04-04

## 2018-11-05 NOTE — TELEPHONE ENCOUNTER
----- Message from Kacey Whelan sent at 11/5/2018 11:11 AM PST -----  Regarding: Prescription Question  Contact: [phone]  Why are there no refills on my Lovastatin Rx?.    The pharmacy has to call my DOCTOR  every time I need to refill it. Please fix this.    Kacey Whelan

## 2019-01-25 DIAGNOSIS — E11.9 TYPE 2 DIABETES MELLITUS WITHOUT COMPLICATION, WITHOUT LONG-TERM CURRENT USE OF INSULIN (HCC): ICD-10-CM

## 2019-01-28 ENCOUNTER — TELEPHONE (OUTPATIENT)
Dept: MEDICAL GROUP | Facility: PHYSICIAN GROUP | Age: 74
End: 2019-01-28

## 2019-01-28 DIAGNOSIS — E11.9 TYPE 2 DIABETES MELLITUS WITHOUT COMPLICATION, WITHOUT LONG-TERM CURRENT USE OF INSULIN (HCC): ICD-10-CM

## 2019-01-29 RX ORDER — METFORMIN HYDROCHLORIDE 500 MG/1
TABLET, EXTENDED RELEASE ORAL
Qty: 90 TAB | Refills: 3 | Status: SHIPPED | OUTPATIENT
Start: 2019-01-29 | End: 2020-01-08 | Stop reason: SDUPTHER

## 2019-01-29 RX ORDER — METFORMIN HYDROCHLORIDE 500 MG/1
TABLET, EXTENDED RELEASE ORAL
Qty: 90 TAB | Refills: 0 | OUTPATIENT
Start: 2019-01-29

## 2019-01-29 NOTE — TELEPHONE ENCOUNTER
----- Message from Gela Howell sent at 1/28/2019  4:14 PM PST -----  Regarding: Medication Refill  Hello Team,      I had patient Kacey Whelan call inbound requesting a refill for her Metformin 500mg 90 Day Supply.    She has tried contacting your office but she hasn't been successful.    Could some one reach out and possibly renew prescription?  Her MyChart states she has no refills remaining.     I appreciate your assistance,    [facility] [employer] Customer Service

## 2019-04-04 ENCOUNTER — OFFICE VISIT (OUTPATIENT)
Dept: MEDICAL GROUP | Facility: MEDICAL CENTER | Age: 74
End: 2019-04-04
Payer: MEDICARE

## 2019-04-04 VITALS
SYSTOLIC BLOOD PRESSURE: 126 MMHG | DIASTOLIC BLOOD PRESSURE: 74 MMHG | RESPIRATION RATE: 16 BRPM | WEIGHT: 123 LBS | BODY MASS INDEX: 21.79 KG/M2 | OXYGEN SATURATION: 98 % | TEMPERATURE: 98.1 F | HEART RATE: 63 BPM | HEIGHT: 63 IN

## 2019-04-04 DIAGNOSIS — E78.00 HYPERCHOLESTEREMIA: ICD-10-CM

## 2019-04-04 DIAGNOSIS — E11.9 TYPE 2 DIABETES MELLITUS WITHOUT COMPLICATION, WITHOUT LONG-TERM CURRENT USE OF INSULIN (HCC): ICD-10-CM

## 2019-04-04 DIAGNOSIS — Z12.31 SCREENING MAMMOGRAM, ENCOUNTER FOR: ICD-10-CM

## 2019-04-04 DIAGNOSIS — M81.0 AGE-RELATED OSTEOPOROSIS WITHOUT CURRENT PATHOLOGICAL FRACTURE: ICD-10-CM

## 2019-04-04 DIAGNOSIS — I10 ESSENTIAL HYPERTENSION: Primary | ICD-10-CM

## 2019-04-04 DIAGNOSIS — Z13.29 SCREENING FOR THYROID DISORDER: ICD-10-CM

## 2019-04-04 DIAGNOSIS — E55.9 VITAMIN D DEFICIENCY: ICD-10-CM

## 2019-04-04 DIAGNOSIS — I35.0 AORTIC VALVE STENOSIS, ETIOLOGY OF CARDIAC VALVE DISEASE UNSPECIFIED: ICD-10-CM

## 2019-04-04 LAB
HBA1C MFR BLD: 6 % (ref 0–5.6)
INT CON NEG: NEGATIVE
INT CON POS: POSITIVE

## 2019-04-04 PROCEDURE — 99214 OFFICE O/P EST MOD 30 MIN: CPT | Performed by: NURSE PRACTITIONER

## 2019-04-04 PROCEDURE — 83036 HEMOGLOBIN GLYCOSYLATED A1C: CPT | Performed by: NURSE PRACTITIONER

## 2019-04-04 RX ORDER — LOVASTATIN 10 MG/1
10 TABLET ORAL EVERY EVENING
Qty: 90 TAB | Refills: 3 | Status: SHIPPED | OUTPATIENT
Start: 2019-04-04 | End: 2020-04-06

## 2019-04-04 RX ORDER — LOVASTATIN 10 MG/1
10 TABLET ORAL DAILY
Qty: 90 TAB | Refills: 3 | Status: SHIPPED | OUTPATIENT
Start: 2019-04-04 | End: 2019-04-04 | Stop reason: SDUPTHER

## 2019-04-04 ASSESSMENT — PATIENT HEALTH QUESTIONNAIRE - PHQ9: CLINICAL INTERPRETATION OF PHQ2 SCORE: 0

## 2019-04-04 NOTE — ASSESSMENT & PLAN NOTE
Chronic.  Last bone density 2/2017, showed osteoporosis.  She is taking vitamin D and calcium.  Doing regular weightbearing exercises.  Did not tolerate treatment with bisphosphonate due to GERD.

## 2019-04-04 NOTE — ASSESSMENT & PLAN NOTE
Chronic condition. Stopped her lovastatin a few months ago as the price had gone up.  She is wondering if she needs to continue taking this medication.  She eats well and exercises regularly.

## 2019-04-04 NOTE — ASSESSMENT & PLAN NOTE
Taking Metformin 500mg daily. Last A1C 6.4. Stopped taking lovastatin. Taking lisinopril and baby ASA. Not checking blood sugars at home. Maintains healthy diabetic diet and regular exercise.

## 2019-04-04 NOTE — PROGRESS NOTES
Kacey Whelan is a 73 y.o. female here to establish care and discuss the following:    HPI:   Type 2 diabetes mellitus without complication, without long-term current use of insulin (CMS-[facility])  Taking Metformin 500mg daily. Last A1C 6.4. Stopped taking lovastatin. Taking lisinopril and baby ASA. Not checking blood sugars at home. Maintains healthy diabetic diet and regular exercise.     HTN (hypertension)  Chronic condition. Taking lisinopril 10 mg daily.     Hypercholesteremia  Chronic condition. Stopped her lovastatin a few months ago as the price had gone up.  She is wondering if she needs to continue taking this medication.  She eats well and exercises regularly.    Vitamin D deficiency  Taking vitamin D and calcium supplementation.  Last bone density showed osteoporosis.    Age-related osteoporosis without current pathological fracture  Chronic.  Last bone density 2/2017, showed osteoporosis.  She is taking vitamin D and calcium.  Doing regular weightbearing exercises.  Did not tolerate treatment with bisphosphonate due to GERD.    Current medicines (including changes today)  Current Outpatient Prescriptions   Medication Sig Dispense Refill   • lovastatin (MEVACOR) 10 MG tablet Take 1 Tab by mouth every evening. 90 Tab 3   • metFORMIN ER (GLUCOPHAGE XR) 500 MG TABLET SR 24 HR TAKE ONE TABLET BY MOUTH ONCE DAILY 90 Tab 3   • lisinopril (PRINIVIL) 10 MG Tab TAKE ONE TABLET BY MOUTH ONCE DAILY 90 Tab 3   • Calcium Citrate-Vitamin D (CALCIUM + D PO) Take  by mouth.     • therapeutic multivitamin-minerals (THERAGRAN-M) Tab Take 1 Tab by mouth every day.     • Blood Glucose Monitoring Suppl SUPPLIES Misc Test strips order: Test strips for Meter that's covered by insurance. Sig: use tid and prn ssx high or low sugar #100 RF x 3 100 Strip 11   • Lancets Misc Lancets order: Lancets  Sig: use bid and prn ssx high or low sugar. #100 RF x 3 100 Each 11   • Blood Glucose Monitoring Suppl Device Meter: Dispense Device  "of Insurance Preference. Sig. Use as directed for blood sugar monitoring. #1. NR. 1 Device 1   • aspirin 81 MG tablet Take 1 Tab by mouth every day. 100 Tab 3     No current facility-administered medications for this visit.      She  has a past medical history of Diabetes (HCC); Hearing loss; Heart murmur; Hyperlipidemia; Hypertension; and Osteoporosis.  She  has a past surgical history that includes primary c section and tonsillectomy.  Social History   Substance Use Topics   • Smoking status: Never Smoker   • Smokeless tobacco: Never Used   • Alcohol use 1.2 oz/week     2 Glasses of wine per week     Social History     Social History Narrative   • No narrative on file     Family History   Problem Relation Age of Onset   • Cancer Mother         breast cancer   • Heart Disease Father 46        MI   • Heart Attack Father    • Heart Disease Sister         2 heart surgery   • Diabetes Brother    • Cancer Brother         brain tumor   • Heart Disease Brother         HEART STENT   • Diabetes Brother    • Diabetes Brother    • Cancer Brother         anal     Family Status   Relation Status   • Mo  at age 52   • Fa  at age 46   • Sis Alive   • Bro    • Bro Alive   • Bro Alive         ROS  No chest pain, no abdominal pain, no rash.  Positive ROS as per HPI.  All other systems reviewed and are negative      Objective:     /74 (BP Location: Right arm, Patient Position: Sitting, BP Cuff Size: Adult)   Pulse 63   Temp 36.7 °C (98.1 °F) (Temporal)   Resp 16   Ht 1.6 m (5' 3\")   Wt 55.8 kg (123 lb)   SpO2 98%  Body mass index is 21.79 kg/m².     Physical Exam:    Constitutional: Alert, no distress.  Skin: Warm, dry, good turgor, no rashes in visible areas.  Eye: Equal, round, conjunctiva clear, lids normal.  ENMT: Lips without lesions, good dentition  Neck: Trachea midline  Respiratory: Unlabored respiratory effort, lungs clear to auscultation, no wheezes, no ronchi.  Cardiovascular: Normal S1, " S2, + murmur, no edema.  Psych: Alert and oriented x3, normal affect and mood.      Assessment and Plan:   The following treatment plan was discussed    1. Type 2 diabetes mellitus without complication, without long-term current use of insulin (HCC)  Stable.   Last A1c: 6.0  Medications: Metformin 500 mg daily  ACE: Lisinopril 10 mg daily  Statin: Lovastatin 10 mg daily  ASA: 81 mg daily  Diabetic diet: Reinforced  Exercise: Reinforced  Checks feet: yes, no sores  Eye exam: 10/2018  Kidney function: Stable,+ microalbuminuria  Denies polyuria, polydipsia, numbness or tingling in extremities    - POCT Hemoglobin A1C  - CBC WITH DIFFERENTIAL; Future  - Comp Metabolic Panel; Future  - HEMOGLOBIN A1C; Future  - MICROALBUMIN CREAT RATIO URINE; Future  - Lipid Profile; Future    2. Aortic valve stenosis, etiology of cardiac valve disease unspecified  Stable, asymptomatic  Recheck echo  - EC-ECHOCARDIOGRAM COMPLETE W/O CONT; Future    3. Vitamin D deficiency  - VITAMIN D,25 HYDROXY; Future    4. Screening for thyroid disorder  - TSH WITH REFLEX TO FT4; Future    5. Essential hypertension  Stable  Continue lisinopril 10 mg daily  - CBC WITH DIFFERENTIAL; Future  - Comp Metabolic Panel; Future  - MICROALBUMIN CREAT RATIO URINE; Future    6. Hypercholesteremia  Stable  Restart lovastatin 10 mg daily  - Lipid Profile; Future  - lovastatin (MEVACOR) 10 MG tablet; Take 1 Tab by mouth every evening.  Dispense: 90 Tab; Refill: 3    7. Screening mammogram, encounter for  - MA-SCREEN MAMMO W/CAD-BILAT; Future    8. Age-related osteoporosis without current pathological fracture  Continue vitamin D and calcium supplement daily      Records reviewed  Followup: Return in about 6 months (around 10/4/2019) for Diabetes, Hypertension, Cholesterol, Review Labs.    I have placed the below orders and discussed them with an approved delegating provider. The MA is performing the below orders under the direction of Dr. Alvarez

## 2019-04-05 ENCOUNTER — PATIENT MESSAGE (OUTPATIENT)
Dept: MEDICAL GROUP | Facility: MEDICAL CENTER | Age: 74
End: 2019-04-05

## 2019-04-05 RX ORDER — LISINOPRIL 10 MG/1
10 TABLET ORAL DAILY
Qty: 90 TAB | Refills: 3 | Status: SHIPPED | OUTPATIENT
Start: 2019-04-05 | End: 2020-03-11

## 2019-05-01 ENCOUNTER — APPOINTMENT (OUTPATIENT)
Dept: CARDIOLOGY | Facility: MEDICAL CENTER | Age: 74
End: 2019-05-01
Attending: NURSE PRACTITIONER
Payer: MEDICARE

## 2019-05-03 ENCOUNTER — TELEPHONE (OUTPATIENT)
Dept: MEDICAL GROUP | Facility: MEDICAL CENTER | Age: 74
End: 2019-05-03

## 2019-05-03 ENCOUNTER — HOSPITAL ENCOUNTER (OUTPATIENT)
Dept: RADIOLOGY | Facility: MEDICAL CENTER | Age: 74
End: 2019-05-03
Attending: NURSE PRACTITIONER
Payer: MEDICARE

## 2019-05-03 DIAGNOSIS — Z12.31 SCREENING MAMMOGRAM, ENCOUNTER FOR: ICD-10-CM

## 2019-05-03 PROCEDURE — 77063 BREAST TOMOSYNTHESIS BI: CPT

## 2019-05-04 NOTE — TELEPHONE ENCOUNTER
----- Message from NED Garcias sent at 5/3/2019  5:49 PM PDT -----  Mammogram normal, repeat annually.    NED Garcias

## 2019-09-24 ENCOUNTER — HOSPITAL ENCOUNTER (OUTPATIENT)
Dept: LAB | Facility: MEDICAL CENTER | Age: 74
End: 2019-09-24
Attending: NURSE PRACTITIONER
Payer: MEDICARE

## 2019-09-24 DIAGNOSIS — E55.9 VITAMIN D DEFICIENCY: ICD-10-CM

## 2019-09-24 DIAGNOSIS — E11.9 TYPE 2 DIABETES MELLITUS WITHOUT COMPLICATION, WITHOUT LONG-TERM CURRENT USE OF INSULIN (HCC): ICD-10-CM

## 2019-09-24 DIAGNOSIS — I10 ESSENTIAL HYPERTENSION: ICD-10-CM

## 2019-09-24 DIAGNOSIS — E78.00 HYPERCHOLESTEREMIA: ICD-10-CM

## 2019-09-24 DIAGNOSIS — Z13.29 SCREENING FOR THYROID DISORDER: ICD-10-CM

## 2019-09-24 LAB
25(OH)D3 SERPL-MCNC: 33 NG/ML (ref 30–100)
ALBUMIN SERPL BCP-MCNC: 4.4 G/DL (ref 3.2–4.9)
ALBUMIN/GLOB SERPL: 1.6 G/DL
ALP SERPL-CCNC: 43 U/L (ref 30–99)
ALT SERPL-CCNC: 11 U/L (ref 2–50)
ANION GAP SERPL CALC-SCNC: 8 MMOL/L (ref 0–11.9)
AST SERPL-CCNC: 15 U/L (ref 12–45)
BASOPHILS # BLD AUTO: 1.1 % (ref 0–1.8)
BASOPHILS # BLD: 0.07 K/UL (ref 0–0.12)
BILIRUB SERPL-MCNC: 0.5 MG/DL (ref 0.1–1.5)
BUN SERPL-MCNC: 17 MG/DL (ref 8–22)
CALCIUM SERPL-MCNC: 9.6 MG/DL (ref 8.5–10.5)
CHLORIDE SERPL-SCNC: 104 MMOL/L (ref 96–112)
CHOLEST SERPL-MCNC: 210 MG/DL (ref 100–199)
CO2 SERPL-SCNC: 26 MMOL/L (ref 20–33)
CREAT SERPL-MCNC: 0.74 MG/DL (ref 0.5–1.4)
CREAT UR-MCNC: 155.9 MG/DL
EOSINOPHIL # BLD AUTO: 0.12 K/UL (ref 0–0.51)
EOSINOPHIL NFR BLD: 2 % (ref 0–6.9)
ERYTHROCYTE [DISTWIDTH] IN BLOOD BY AUTOMATED COUNT: 41.3 FL (ref 35.9–50)
EST. AVERAGE GLUCOSE BLD GHB EST-MCNC: 126 MG/DL
FASTING STATUS PATIENT QL REPORTED: NORMAL
GLOBULIN SER CALC-MCNC: 2.8 G/DL (ref 1.9–3.5)
GLUCOSE SERPL-MCNC: 121 MG/DL (ref 65–99)
HBA1C MFR BLD: 6 % (ref 0–5.6)
HCT VFR BLD AUTO: 42 % (ref 37–47)
HDLC SERPL-MCNC: 69 MG/DL
HGB BLD-MCNC: 14.4 G/DL (ref 12–16)
IMM GRANULOCYTES # BLD AUTO: 0.01 K/UL (ref 0–0.11)
IMM GRANULOCYTES NFR BLD AUTO: 0.2 % (ref 0–0.9)
LDLC SERPL CALC-MCNC: 124 MG/DL
LYMPHOCYTES # BLD AUTO: 1.69 K/UL (ref 1–4.8)
LYMPHOCYTES NFR BLD: 27.6 % (ref 22–41)
MCH RBC QN AUTO: 31.4 PG (ref 27–33)
MCHC RBC AUTO-ENTMCNC: 34.3 G/DL (ref 33.6–35)
MCV RBC AUTO: 91.5 FL (ref 81.4–97.8)
MICROALBUMIN UR-MCNC: 5.7 MG/DL
MICROALBUMIN/CREAT UR: 37 MG/G (ref 0–30)
MONOCYTES # BLD AUTO: 0.36 K/UL (ref 0–0.85)
MONOCYTES NFR BLD AUTO: 5.9 % (ref 0–13.4)
NEUTROPHILS # BLD AUTO: 3.88 K/UL (ref 2–7.15)
NEUTROPHILS NFR BLD: 63.2 % (ref 44–72)
NRBC # BLD AUTO: 0 K/UL
NRBC BLD-RTO: 0 /100 WBC
PLATELET # BLD AUTO: 243 K/UL (ref 164–446)
PMV BLD AUTO: 8.7 FL (ref 9–12.9)
POTASSIUM SERPL-SCNC: 4 MMOL/L (ref 3.6–5.5)
PROT SERPL-MCNC: 7.2 G/DL (ref 6–8.2)
RBC # BLD AUTO: 4.59 M/UL (ref 4.2–5.4)
SODIUM SERPL-SCNC: 138 MMOL/L (ref 135–145)
TRIGL SERPL-MCNC: 86 MG/DL (ref 0–149)
TSH SERPL DL<=0.005 MIU/L-ACNC: 1.05 UIU/ML (ref 0.38–5.33)
WBC # BLD AUTO: 6.1 K/UL (ref 4.8–10.8)

## 2019-09-24 PROCEDURE — 84443 ASSAY THYROID STIM HORMONE: CPT

## 2019-09-24 PROCEDURE — 80053 COMPREHEN METABOLIC PANEL: CPT

## 2019-09-24 PROCEDURE — 85025 COMPLETE CBC W/AUTO DIFF WBC: CPT

## 2019-09-24 PROCEDURE — 82306 VITAMIN D 25 HYDROXY: CPT

## 2019-09-24 PROCEDURE — 80061 LIPID PANEL: CPT

## 2019-09-24 PROCEDURE — 83036 HEMOGLOBIN GLYCOSYLATED A1C: CPT | Mod: GA

## 2019-09-24 PROCEDURE — 36415 COLL VENOUS BLD VENIPUNCTURE: CPT

## 2019-09-24 PROCEDURE — 82570 ASSAY OF URINE CREATININE: CPT

## 2019-09-24 PROCEDURE — 82043 UR ALBUMIN QUANTITATIVE: CPT

## 2019-09-25 ENCOUNTER — TELEPHONE (OUTPATIENT)
Dept: MEDICAL GROUP | Facility: MEDICAL CENTER | Age: 74
End: 2019-09-25

## 2019-09-25 SDOH — HEALTH STABILITY: MENTAL HEALTH: HOW OFTEN DO YOU HAVE A DRINK CONTAINING ALCOHOL?: NOT ASKED

## 2019-09-25 SDOH — HEALTH STABILITY: MENTAL HEALTH: HOW OFTEN DO YOU HAVE 6 OR MORE DRINKS ON ONE OCCASION?: NOT ASKED

## 2019-09-25 SDOH — HEALTH STABILITY: MENTAL HEALTH: HOW MANY STANDARD DRINKS CONTAINING ALCOHOL DO YOU HAVE ON A TYPICAL DAY?: NOT ASKED

## 2019-09-25 NOTE — TELEPHONE ENCOUNTER
Future Appointments       Provider Department Center    10/2/2019 1:00 PM CHAVA Garcias.; [facility]          ANNUAL WELLNESS VISIT PRE-VISIT PLANNING WITHOUT OUTREACH    1.  Reviewed note from last office visit with PCP: YES    2.  If any orders were placed at last visit, do we have Results/Consult Notes?        •  Labs - Labs ordered, completed on 9/24/19 and results are in chart.  Note: If patient appointment is for lab review and patient did not complete labs, check with provider if OK to reschedule patient until labs completed.       •  Imaging - Imaging ordered, NOT completed. Patient advised to complete prior to next appointment.       •  Referrals - No referrals were ordered at last office visit.    3.  Immunizations were updated in Epic using WebIZ?: Epic matches WebIZ       •  WebIZ Recommendations: TDAP, SHINGRIX (Shingles) and CPOX.        •  Is patient due for Tdap? YES. Patient was notified of copay/out of pocket cost.       •  Is patient due for Shingrix? YES. Patient was notified of copay/out of pocket cost.     4.  Patient is due for the following Health Maintenance Topics:   Health Maintenance Due   Topic Date Due   • HEPATITIS C SCREENING  1945   • IMM HEP B VACCINE (1 of 3 - Risk 3-dose series) 10/12/1966   • IMM DTaP/Tdap/Td Vaccine (1 - Tdap) 10/12/1966   • COLONOSCOPY  10/12/1997   • IMM ZOSTER VACCINES (1 of 2) 10/12/1997   • DIABETES MONOFILAMENT / LE EXAM  05/22/2019   • RETINAL SCREENING  10/24/2019       - Patient already has appointment scheduled for Annual Wellness Visit (AWV).  5.  Reviewed/Updated the following with patient:       •   Preferred Pharmacy? YES       •   Preferred Lab? YES       •   Preferred Communication? YES       •   Allergies? YES       •   Medications? YES. Was Abstract Encounter opened and chart updated? YES       •   Social History? YES. Was Abstract Encounter opened and chart updated?  YES       •   Family History (document living status of immediate family members and if + hx of  cancer, diabetes, hypertension, hyperlipidemia, heart attack, stroke) YES. Was Abstract Encounter opened and chart updated? YES    6.  Care Team Updated:       •   DME Company (gait device, O2, CPAP, etc.): NO       •   Other Specialists (eye doctor, derm, GYN, cardiology, endo, etc): NO    7. Orders for overdue Health Maintenance topics pended in Pre-Charting? N\A    8.  Patient has the following Care Path diagnoses on Problem List:  DIABETES    Has patient ever had diabetes education? Yes, and is NOT interested in more at this time.      9.  Patient was advised: “This is a free wellness visit. The provider will screen for medical conditions to help you stay healthy. If you have other concerns to address you may be asked to discuss these at a separate visit or there may be an additional fee.”     10.  Patient was informed to arrive 15 min prior to their scheduled appointment and bring in their medication bottles.

## 2019-10-02 ENCOUNTER — OFFICE VISIT (OUTPATIENT)
Dept: MEDICAL GROUP | Facility: MEDICAL CENTER | Age: 74
End: 2019-10-02
Payer: MEDICARE

## 2019-10-02 VITALS
HEART RATE: 78 BPM | DIASTOLIC BLOOD PRESSURE: 68 MMHG | OXYGEN SATURATION: 96 % | TEMPERATURE: 97.8 F | BODY MASS INDEX: 22.68 KG/M2 | SYSTOLIC BLOOD PRESSURE: 104 MMHG | HEIGHT: 63 IN | WEIGHT: 128 LBS

## 2019-10-02 DIAGNOSIS — H90.6 MIXED CONDUCTIVE AND SENSORINEURAL HEARING LOSS OF BOTH EARS: ICD-10-CM

## 2019-10-02 DIAGNOSIS — E78.00 HYPERCHOLESTEREMIA: ICD-10-CM

## 2019-10-02 DIAGNOSIS — I35.0 AORTIC VALVE STENOSIS, ETIOLOGY OF CARDIAC VALVE DISEASE UNSPECIFIED: ICD-10-CM

## 2019-10-02 DIAGNOSIS — M81.0 AGE-RELATED OSTEOPOROSIS WITHOUT CURRENT PATHOLOGICAL FRACTURE: ICD-10-CM

## 2019-10-02 DIAGNOSIS — E55.9 VITAMIN D DEFICIENCY: ICD-10-CM

## 2019-10-02 DIAGNOSIS — I10 ESSENTIAL HYPERTENSION: ICD-10-CM

## 2019-10-02 DIAGNOSIS — N39.46 MIXED STRESS AND URGE URINARY INCONTINENCE: ICD-10-CM

## 2019-10-02 DIAGNOSIS — E11.9 TYPE 2 DIABETES MELLITUS WITHOUT COMPLICATION, WITHOUT LONG-TERM CURRENT USE OF INSULIN (HCC): ICD-10-CM

## 2019-10-02 PROCEDURE — G0439 PPPS, SUBSEQ VISIT: HCPCS | Performed by: NURSE PRACTITIONER

## 2019-10-02 ASSESSMENT — ENCOUNTER SYMPTOMS: GENERAL WELL-BEING: GOOD

## 2019-10-02 ASSESSMENT — ACTIVITIES OF DAILY LIVING (ADL): BATHING_REQUIRES_ASSISTANCE: 0

## 2019-10-02 ASSESSMENT — PATIENT HEALTH QUESTIONNAIRE - PHQ9: CLINICAL INTERPRETATION OF PHQ2 SCORE: 0

## 2019-10-02 NOTE — PROGRESS NOTES
Chief Complaint   Patient presents with   • Annual Wellness Visit         HPI:  Kacey Whelan is a 73 y.o. here for Medicare Annual Wellness Visit        Patient Active Problem List    Diagnosis Date Noted   • Age-related osteoporosis without current pathological fracture 06/26/2017   • Vitamin D deficiency 03/30/2017   • Type 2 diabetes mellitus without complication, without long-term current use of insulin (HCC) 10/14/2016   • Hypercholesteremia 10/14/2016   • HTN (hypertension) 03/06/2015   • Aortic stenosis 03/06/2015   • Hearing loss 03/06/2015       Current Outpatient Medications   Medication Sig Dispense Refill   • lisinopril (PRINIVIL) 10 MG Tab Take 1 Tab by mouth every day. 90 Tab 3   • lovastatin (MEVACOR) 10 MG tablet Take 1 Tab by mouth every evening. 90 Tab 3   • metFORMIN ER (GLUCOPHAGE XR) 500 MG TABLET SR 24 HR TAKE ONE TABLET BY MOUTH ONCE DAILY 90 Tab 3   • Calcium Citrate-Vitamin D (CALCIUM + D PO) Take  by mouth.     • therapeutic multivitamin-minerals (THERAGRAN-M) Tab Take 1 Tab by mouth every day.     • aspirin 81 MG tablet Take 1 Tab by mouth every day. 100 Tab 3   • Blood Glucose Monitoring Suppl SUPPLIES Misc Test strips order: Test strips for Meter that's covered by insurance. Sig: use tid and prn ssx high or low sugar #100 RF x 3 (Patient not taking: Reported on 9/25/2019) 100 Strip 11   • Lancets Misc Lancets order: Lancets  Sig: use bid and prn ssx high or low sugar. #100 RF x 3 (Patient not taking: Reported on 9/25/2019) 100 Each 11   • Blood Glucose Monitoring Suppl Device Meter: Dispense Device of Insurance Preference. Sig. Use as directed for blood sugar monitoring. #1. NR. (Patient not taking: Reported on 9/25/2019) 1 Device 1     No current facility-administered medications for this visit.         Patient is taking medications as noted in medication list.  Current supplements as per medication list.     Allergies: Latex    Current social contact/activities: Patient reports she  doesn't do much because of her hearing disability but she does talk on the phone a lot     Is patient current with immunizations? No, due for HEPATITIS B, TDAP and SHINGRIX (Shingles). Patient is interested in receiving NONE today.    She  reports that she has never smoked. She has never used smokeless tobacco. She reports that she drank about 1.2 oz of alcohol per week. She reports that she does not use drugs.  Counseling given: Not Answered        DPA/Advanced directive: Patient does not have an Advanced Directive.  A packet and workshop information was given on Advanced Directives.    ROS:    Gait: Uses no assistive device   Ostomy: No   Other tubes: No   Amputations: No   Chronic oxygen use No   Last eye exam Patient reports 2017 or 2018  Wears hearing aids: No   : Reports urinary leakage during the last 6 months that has somewhat interfered with their daily activities or sleep.      Screening:    DIABETES    Has patient ever had diabetes education? Yes, and is NOT interested in more at this time.            Depression Screening    Little interest or pleasure in doing things?  0 - not at all  Feeling down, depressed, or hopeless? 0 - not at all  Patient Health Questionnaire Score: 0    If depressive symptoms identified deferred to follow up visit unless specifically addressed in assessment and plan.    Interpretation of PHQ-9 Total Score   Score Severity   1-4 No Depression   5-9 Mild Depression   10-14 Moderate Depression   15-19 Moderately Severe Depression   20-27 Severe Depression    Screening for Cognitive Impairment    Three Minute Recall (village, kitchen, baby)  2/3    Tonny clock face with all 12 numbers and set the hands to show 10 past 10.  Yes 5/5  If cognitive concerns identified, deferred for follow up unless specifically addressed in assessment and plan.    Fall Risk Assessment    Has the patient had two or more falls in the last year or any fall with injury in the last year?  No  If fall risk  identified, deferred for follow up unless specifically addressed in assessment and plan.    Safety Assessment    Throw rugs on floor.  No  Handrails on all stairs.  Yes  Good lighting in all hallways.  Yes  Difficulty hearing.  Yes  Patient counseled about all safety risks that were identified.    Functional Assessment ADLs    Are there any barriers preventing you from cooking for yourself or meeting nutritional needs?  No.    Are there any barriers preventing you from driving safely or obtaining transportation?  No.    Are there any barriers preventing you from using a telephone or calling for help?  No.    Are there any barriers preventing you from shopping?  No.    Are there any barriers preventing you from taking care of your own finances?  No.    Are there any barriers preventing you from managing your medications?  No.    Are there any barriers preventing you from showering, bathing or dressing yourself?  No.    Are you currently engaging in any exercise or physical activity?  Yes.  Patient reports walking daily at least twice daily  What is your perception of your health?  Good.    Health Maintenance Summary                HEPATITIS C SCREENING Overdue 1947     IMM HEP B VACCINE Overdue 10/12/1966     IMM DTaP/Tdap/Td Vaccine Overdue 10/12/1966     COLONOSCOPY Overdue 10/12/1997     IMM ZOSTER VACCINES Overdue 10/12/1997     DIABETES MONOFILAMENT / LE EXAM Overdue 5/22/2019      Done 5/22/2018 AMB DIABETIC MONOFILAMENT LOWER EXTREMITY EXAM     Patient has more history with this topic...    Annual Wellness Visit Overdue 10/2/2019      Done 10/1/2018 Visit Dx: Medicare annual wellness visit, subsequent     Patient has more history with this topic...    RETINAL SCREENING Next Due 10/24/2019      Done 10/24/2018 REFERRAL FOR RETINAL SCREENING EXAM     Patient has more history with this topic...    A1C SCREENING Next Due 3/24/2020      Done 9/24/2019 HEMOGLOBIN A1C     Patient has more history with this  "topic...    FASTING LIPID PROFILE Next Due 9/24/2020      Done 9/24/2019 LIPID PROFILE     Patient has more history with this topic...    URINE ACR / MICROALBUMIN Next Due 9/24/2020      Done 9/24/2019 MICROALBUMIN CREAT RATIO URINE     Patient has more history with this topic...    SERUM CREATININE Next Due 9/24/2020      Done 9/24/2019 COMP METABOLIC PANEL     Patient has more history with this topic...    MAMMOGRAM Next Due 5/3/2021      Done 5/3/2019 MA-SCREENING MAMMO BILAT W/TOMOSYNTHESIS W/CAD     Patient has more history with this topic...    BONE DENSITY Next Due 2/14/2022      Done 2/14/2017 DS-BONE DENSITY STUDY (DEXA)          Patient Care Team:  NED Garcias as PCP - General (Family Medicine)  Hi Medina M.D. as Consulting Physician (Ophthalmology)  Zaire Brown M.D. (Otolaryngology)    Social History     Tobacco Use   • Smoking status: Never Smoker   • Smokeless tobacco: Never Used   Substance Use Topics   • Alcohol use: Not Currently     Alcohol/week: 1.2 oz     Types: 2 Glasses of wine per week   • Drug use: No     Family History   Problem Relation Age of Onset   • Cancer Mother         breast cancer   • Heart Disease Father 46        MI   • Heart Attack Father    • Heart Disease Sister         2 heart surgery   • Diabetes Brother    • Cancer Brother         brain tumor   • Heart Disease Brother         HEART STENT   • Diabetes Brother    • Diabetes Brother    • Cancer Brother         anal     She  has a past medical history of Diabetes (HCC), Hearing loss, Heart murmur, Hyperlipidemia, Hypertension, and Osteoporosis.   Past Surgical History:   Procedure Laterality Date   • PRIMARY C SECTION      3 c section   • TONSILLECTOMY             Exam:     /68   Pulse 78   Temp 36.6 °C (97.8 °F) (Temporal)   Ht 1.6 m (5' 3\")   Wt 58.1 kg (128 lb)   SpO2 96%  Body mass index is 22.67 kg/m².    Hearing poor, severe hearing loss  Dentition good  Alert, oriented in no acute " distress.  Eye contact is good, speech goal directed, affect calm      Assessment and Plan. The following treatment and monitoring plan is recommended:    1. Age-related osteoporosis without current pathological fracture  Stable  Last DEXA 2017  Taking 1200 mg calcium and Vit D 2000 IU daily  Did take alendronate 70 mg weekly for 1 year then stopped as she was concerned about potential side effects.   Recheck DEXA   2. Aortic valve stenosis, etiology of cardiac valve disease unspecified  Last Echo 2017, showed mild aortic stenosis  Denies shortness of breath, chest pain, able to walk and tolerate exercise.   Declined repeat Echo at this time. Will report worsening symptoms.    3. Mixed conductive and sensorineural hearing loss of both ears  Stable  Severe conductive and sensorineural   Continue follow up with Audiology/ENT  Failed hearing aids, made sound louder, not more clear.    4. Essential hypertension  Stable  Continue lisinopril 10 mg daily.    5. Hypercholesteremia  Stable  Worsened on recent labs, likely due to increased sugar/candy intake, patient will reduce this.  Continue lovastatin 10 mg daily   6. Type 2 diabetes mellitus without complication, without long-term current use of insulin (HCC)  Stable  A1C 6.0  Continue metformin  mg daily  ON Statin and ASA  Has had mild microalbuminuria since 2016, stable  Advised to start checking BP twice daily for the next few weeks and keep a log and send via [email or web address] to ce[facility] DM control.    7. Vitamin D deficiency  Stable  Continue daily Vit D suppelment    8. Mixed stress and urge urinary incontinence  Unstable  Follow up with PT for pelvic floor therapy  REFERRAL TO PHYSICAL THERAPY Reason for Therapy: Eval/Treat/Report         Services suggested: No services needed at this time  Health Care Screening recommendations as per orders if indicated.  Referrals offered: PT/OT/Nutrition counseling/Behavioral Health/Smoking cessation as per orders if  indicated.    Discussion today about general wellness and lifestyle habits:    · Prevent falls and reduce trip hazards; Cautioned about securing or removing rugs.  · Have a working fire alarm and carbon monoxide detector;   · Engage in regular physical activity and social activities       Follow-up: Return in about 6 months (around 4/2/2020).

## 2019-10-02 NOTE — ASSESSMENT & PLAN NOTE
Ongoing for years, even when she was in high school jumping on a trampoline. Now since having her 5 children she has noticed increased incontinence with increased caffeine intake, full bladder, sneezing, standing suddenly. Has tried going Kegel exercises in the past but they didn't help.

## 2019-10-03 DIAGNOSIS — E11.9 TYPE 2 DIABETES MELLITUS WITHOUT COMPLICATION, WITHOUT LONG-TERM CURRENT USE OF INSULIN (HCC): ICD-10-CM

## 2019-10-08 ENCOUNTER — HOSPITAL ENCOUNTER (OUTPATIENT)
Dept: RADIOLOGY | Facility: MEDICAL CENTER | Age: 74
End: 2019-10-08
Attending: NURSE PRACTITIONER
Payer: MEDICARE

## 2019-10-08 DIAGNOSIS — M81.0 AGE-RELATED OSTEOPOROSIS WITHOUT CURRENT PATHOLOGICAL FRACTURE: ICD-10-CM

## 2019-10-08 PROCEDURE — 77080 DXA BONE DENSITY AXIAL: CPT

## 2019-10-11 ENCOUNTER — TELEPHONE (OUTPATIENT)
Dept: MEDICAL GROUP | Facility: MEDICAL CENTER | Age: 74
End: 2019-10-11

## 2019-10-11 DIAGNOSIS — M81.0 AGE-RELATED OSTEOPOROSIS WITHOUT CURRENT PATHOLOGICAL FRACTURE: ICD-10-CM

## 2019-10-11 NOTE — TELEPHONE ENCOUNTER
----- Message from NED Garcias sent at 10/11/2019  9:10 AM PDT -----  Please notify patient that her bone density shows that she has osteoporosis in her spine and osteopenia, or thinning of the bone in her hip.    We should consider putting her back on Fosamax.  If she is very concerned about the side effects, we could consider doing Prolia which is an every 6-month injection.  Please advise patient to let me know which she would like to do and I can order that.    NED Garcias

## 2019-10-11 NOTE — TELEPHONE ENCOUNTER
Patient would like to start oral medication first and think about injection Please sen rx to [facility]

## 2019-10-14 RX ORDER — ALENDRONATE SODIUM 70 MG/1
70 TABLET ORAL
Qty: 12 TAB | Refills: 3 | Status: SHIPPED | OUTPATIENT
Start: 2019-10-14 | End: 2020-06-03

## 2020-04-06 DIAGNOSIS — E78.00 HYPERCHOLESTEREMIA: ICD-10-CM

## 2020-04-06 NOTE — TELEPHONE ENCOUNTER
Received request via: Pharmacy    Was the patient seen in the last year in this department? Yes 10/2/2019    Does the patient have an active prescription (recently filled or refills available) for medication(s) requested? No

## 2020-04-07 RX ORDER — LOVASTATIN 10 MG/1
TABLET ORAL
Qty: 90 TAB | Refills: 0 | Status: SHIPPED | OUTPATIENT
Start: 2020-04-07 | End: 2020-07-07 | Stop reason: SDUPTHER

## 2020-06-03 DIAGNOSIS — M81.0 AGE-RELATED OSTEOPOROSIS WITHOUT CURRENT PATHOLOGICAL FRACTURE: ICD-10-CM

## 2020-06-04 RX ORDER — ALENDRONATE SODIUM 70 MG/1
TABLET ORAL
Qty: 12 TAB | Refills: 0 | Status: SHIPPED | OUTPATIENT
Start: 2020-06-04 | End: 2020-08-21

## 2020-08-18 DIAGNOSIS — M81.0 AGE-RELATED OSTEOPOROSIS WITHOUT CURRENT PATHOLOGICAL FRACTURE: ICD-10-CM

## 2020-08-18 NOTE — LETTER
August 24, 2020        Kacey Whelan  [address]  [place] [address]        Dear Kacey:    We have received a request from your pharmacy to refill your prescription(s). After careful review of your chart, we have noted you are due for an annual appointment.  We request you call our office at [phone] at your earliest convenience and make an appointment.     We look forward to scheduling an appointment for you, so that we may provide you with the safest and most complete medical care.        If you have any questions or concerns, please don't hesitate to call.        Sincerely,        Rosmery Wadsworth P.A.-C.    Electronically Signed

## 2020-08-21 RX ORDER — ALENDRONATE SODIUM 70 MG/1
TABLET ORAL
Qty: 12 TAB | Refills: 3 | Status: SHIPPED | OUTPATIENT
Start: 2020-08-21 | End: 2021-06-18

## 2020-08-21 NOTE — TELEPHONE ENCOUNTER
Refill done. Patient is due for annual appointment. Please have patient schedule.  CHAVA Garcias.

## 2020-10-07 ENCOUNTER — OFFICE VISIT (OUTPATIENT)
Dept: MEDICAL GROUP | Facility: MEDICAL CENTER | Age: 75
End: 2020-10-07
Payer: MEDICARE

## 2020-10-07 VITALS
TEMPERATURE: 98.6 F | OXYGEN SATURATION: 96 % | SYSTOLIC BLOOD PRESSURE: 122 MMHG | DIASTOLIC BLOOD PRESSURE: 80 MMHG | HEIGHT: 62 IN | HEART RATE: 63 BPM | WEIGHT: 135 LBS | BODY MASS INDEX: 24.84 KG/M2

## 2020-10-07 DIAGNOSIS — H90.6 MIXED CONDUCTIVE AND SENSORINEURAL HEARING LOSS OF BOTH EARS: ICD-10-CM

## 2020-10-07 DIAGNOSIS — E78.00 HYPERCHOLESTEREMIA: ICD-10-CM

## 2020-10-07 DIAGNOSIS — Z00.00 MEDICARE ANNUAL WELLNESS VISIT, SUBSEQUENT: ICD-10-CM

## 2020-10-07 DIAGNOSIS — I10 ESSENTIAL HYPERTENSION: ICD-10-CM

## 2020-10-07 DIAGNOSIS — N39.46 MIXED STRESS AND URGE URINARY INCONTINENCE: ICD-10-CM

## 2020-10-07 DIAGNOSIS — M81.0 AGE-RELATED OSTEOPOROSIS WITHOUT CURRENT PATHOLOGICAL FRACTURE: ICD-10-CM

## 2020-10-07 DIAGNOSIS — Z11.59 ENCOUNTER FOR HEPATITIS C SCREENING TEST FOR LOW RISK PATIENT: ICD-10-CM

## 2020-10-07 DIAGNOSIS — E55.9 VITAMIN D DEFICIENCY: ICD-10-CM

## 2020-10-07 DIAGNOSIS — E11.9 TYPE 2 DIABETES MELLITUS WITHOUT COMPLICATION, WITHOUT LONG-TERM CURRENT USE OF INSULIN (HCC): ICD-10-CM

## 2020-10-07 DIAGNOSIS — I35.0 AORTIC VALVE STENOSIS, ETIOLOGY OF CARDIAC VALVE DISEASE UNSPECIFIED: ICD-10-CM

## 2020-10-07 PROCEDURE — G0439 PPPS, SUBSEQ VISIT: HCPCS | Performed by: NURSE PRACTITIONER

## 2020-10-07 RX ORDER — LOVASTATIN 10 MG/1
10 TABLET ORAL DAILY
Qty: 90 TAB | Refills: 3 | Status: SHIPPED | OUTPATIENT
Start: 2020-10-07 | End: 2021-07-08

## 2020-10-07 SDOH — HEALTH STABILITY: MENTAL HEALTH: HOW OFTEN DO YOU HAVE A DRINK CONTAINING ALCOHOL?: NEVER

## 2020-10-07 SDOH — HEALTH STABILITY: MENTAL HEALTH: HOW OFTEN DO YOU HAVE 6 OR MORE DRINKS ON ONE OCCASION?: NEVER

## 2020-10-07 ASSESSMENT — FIBROSIS 4 INDEX: FIB4 SCORE: 1.34

## 2020-10-07 ASSESSMENT — ENCOUNTER SYMPTOMS: GENERAL WELL-BEING: GOOD

## 2020-10-07 ASSESSMENT — ACTIVITIES OF DAILY LIVING (ADL): BATHING_REQUIRES_ASSISTANCE: 0

## 2020-10-07 ASSESSMENT — PATIENT HEALTH QUESTIONNAIRE - PHQ9: CLINICAL INTERPRETATION OF PHQ2 SCORE: 0

## 2020-10-07 NOTE — PROGRESS NOTES
Chief Complaint   Patient presents with   • Annual Wellness Visit         HPI:  Kacey Whelan is a 74 y.o. here for Medicare Annual Wellness Visit     Patient Active Problem List    Diagnosis Date Noted   • Mixed stress and urge urinary incontinence 10/02/2019   • Age-related osteoporosis without current pathological fracture 06/26/2017   • Vitamin D deficiency 03/30/2017   • Type 2 diabetes mellitus without complication, without long-term current use of insulin (HCC) 10/14/2016   • Hypercholesteremia 10/14/2016   • HTN (hypertension) 03/06/2015   • Aortic stenosis 03/06/2015   • Hearing loss 03/06/2015       Current Outpatient Medications   Medication Sig Dispense Refill   • alendronate (FOSAMAX) 70 MG Tab Take 1 tablet by mouth once weekly 12 Tab 3   • lovastatin (MEVACOR) 10 MG tablet Take 1 Tab by mouth every day. 90 Tab 3   • lisinopril (PRINIVIL) 10 MG Tab Take 1 tablet by mouth once daily 90 Tab 1   • metFORMIN ER (GLUCOPHAGE XR) 500 MG TABLET SR 24 HR Take 1 Tab by mouth every day. 90 Tab 3   • Blood Glucose Test Strips Test strips order: Test strips for Meter that's covered by insurance. Sig: use tid and prn ssx high or low sugar #100 RF x 3 100 Strip 11   • Calcium Citrate-Vitamin D (CALCIUM + D PO) Take  by mouth.     • therapeutic multivitamin-minerals (THERAGRAN-M) Tab Take 1 Tab by mouth every day.     • Lancets Misc Lancets order: Lancets  Sig: use bid and prn ssx high or low sugar. #100 RF x 3 (Patient not taking: Reported on 9/25/2019) 100 Each 11   • Blood Glucose Monitoring Suppl Device Meter: Dispense Device of Insurance Preference. Sig. Use as directed for blood sugar monitoring. #1. NR. (Patient not taking: Reported on 9/25/2019) 1 Device 1   • aspirin 81 MG tablet Take 1 Tab by mouth every day. 100 Tab 3     No current facility-administered medications for this visit.             Current supplements as per medication list.       Allergies: Latex    Current social contact/activities:  patient reports she keeps social by phone due to hearing loss it makes it hard for in person conversations     She  reports that she has never smoked. She has never used smokeless tobacco. She reports previous alcohol use of about 1.2 oz of alcohol per week. She reports that she does not use drugs.  Counseling given: Not Answered      DPA/Advanced Directive:  Patient does not have an Advanced Directive.  A packet and workshop information was given on Advanced Directives.    ROS:    Gait: Uses no assistive device  Ostomy: No  Other tubes: No  Amputations: No  Chronic oxygen use: No  Last eye exam: patient reports September 18, 2020  Wears hearing aids: No   : Reports urinary leakage during the last 6 months that has not interfered at all with their daily activities or sleep.      POLST/AD    Does the patient have a POLST or Advanced Directive?        Depression Screening    Little interest or pleasure in doing things?  0 - not at all  Feeling down, depressed , or hopeless? 0 - not at all  Patient Health Questionnaire Score: 0     If depressive symptoms identified deferred to follow up visit unless specifically addressed in assessment and plan.    Interpretation of PHQ-9 Total Score   Score Severity   1-4 No Depression   5-9 Mild Depression   10-14 Moderate Depression   15-19 Moderately Severe Depression   20-27 Severe Depression    Screening for Cognitive Impairment    Three Minute Recall (river, kim, finger) 3/3    Tonny clock face with all 12 numbers and set the hands to show 10 past 11.  Yes 5/5  Cognitive concerns identified deferred for follow up unless specifically addressed in assessment and plan.    Fall Risk Assessment    Has the patient had two or more falls in the last year or any fall with injury in the last year?  No    Safety Assessment    Throw rugs on floor.  No  Handrails on all stairs.  Yes  Good lighting in all hallways.  Yes  Difficulty hearing.  Yes  Patient counseled about all safety risks  that were identified.    Functional Assessment ADLs    Are there any barriers preventing you from cooking for yourself or meeting nutritional needs?  No.    Are there any barriers preventing you from driving safely or obtaining transportation?  No.    Are there any barriers preventing you from using a telephone or calling for help?  No.    Are there any barriers preventing you from shopping?  No.    Are there any barriers preventing you from taking care of your own finances?  No.    Are there any barriers preventing you from managing your medications?  No.    Are there any barriers preventing you from showering, bathing or dressing yourself?  No.    Are you currently engaging in any exercise or physical activity?  Yes.  Patient walks at least twice a day for 30 minutes and also does stretching and warm ups   What is your perception of your health?  Good.      Health Maintenance Summary                HEPATITIS C SCREENING Overdue 1947     IMM HEP B VACCINE Overdue 10/12/1966     IMM DTaP/Tdap/Td Vaccine Overdue 10/12/1966     COLONOSCOPY Overdue 10/12/1997     IMM ZOSTER VACCINES Overdue 10/12/1997     DIABETES MONOFILAMENT / LE EXAM Overdue 5/22/2019      Done 5/22/2018 AMB DIABETIC MONOFILAMENT LOWER EXTREMITY EXAM     Patient has more history with this topic...    RETINAL SCREENING Overdue 10/24/2019      Done 10/24/2018 REFERRAL FOR RETINAL SCREENING EXAM     Patient has more history with this topic...    A1C SCREENING Overdue 3/24/2020      Done 9/24/2019 HEMOGLOBIN A1C     Patient has more history with this topic...    FASTING LIPID PROFILE Overdue 9/24/2020      Done 9/24/2019 LIPID PROFILE     Patient has more history with this topic...    URINE ACR / MICROALBUMIN Overdue 9/24/2020      Done 9/24/2019 MICROALBUMIN CREAT RATIO URINE     Patient has more history with this topic...    SERUM CREATININE Overdue 9/24/2020      Done 9/24/2019 COMP METABOLIC PANEL     Patient has more history with this  "topic...    Annual Wellness Visit Overdue 10/2/2020      Done 10/2/2019 SUBSEQUENT ANNUAL WELLNESS VISIT-INCLUDES PPPS ()     Patient has more history with this topic...    MAMMOGRAM Next Due 5/3/2021      Done 5/3/2019 MA-SCREENING MAMMO BILAT W/TOMOSYNTHESIS W/CAD     Patient has more history with this topic...    BONE DENSITY Next Due 10/8/2024      Done 10/8/2019 DS-BONE DENSITY STUDY (DEXA)     Patient has more history with this topic...          Patient Care Team:  NED Garcias as PCP - General (Family Medicine)  Hi Medina M.D. as Consulting Physician (Ophthalmology)  Zaire Brown M.D. (Otolaryngology)        Social History     Tobacco Use   • Smoking status: Never Smoker   • Smokeless tobacco: Never Used   Substance Use Topics   • Alcohol use: Not Currently     Alcohol/week: 1.2 oz     Types: 2 Glasses of wine per week     Frequency: Never     Binge frequency: Never   • Drug use: No     Family History   Problem Relation Age of Onset   • Cancer Mother         breast cancer   • Heart Disease Father 46        MI   • Heart Attack Father    • Heart Disease Sister         2 heart surgery   • Diabetes Brother    • Cancer Brother         brain tumor   • Heart Disease Brother         HEART STENT   • Diabetes Brother    • Diabetes Brother    • Cancer Brother         anal     She  has a past medical history of Diabetes (HCC), Hearing loss, Heart murmur, Hyperlipidemia, Hypertension, and Osteoporosis.   Past Surgical History:   Procedure Laterality Date   • PRIMARY C SECTION      3 c section   • TONSILLECTOMY         Exam:   /80 (BP Location: Right arm, Patient Position: Sitting, BP Cuff Size: Adult)   Pulse 63   Temp 37 °C (98.6 °F) (Temporal)   Ht 1.575 m (5' 2\")   Wt 61.2 kg (135 lb)   SpO2 96%  Body mass index is 24.69 kg/m².    Hearing poor.    Dentition good  Alert, oriented in no acute distress.  Eye contact is good, speech goal directed, affect calm    Monofilament testing " with a 10 gram force: sensation intact: intact bilaterally  Visual Inspection: Feet without maceration, ulcers, fissures.  Pedal pulses: intact bilaterally      Assessment and Plan. The following treatment and monitoring plan is recommended:    1. Essential hypertension  Stable  Continue lisinopril 10mg daily  Comp Metabolic Panel    MICROALBUMIN CREAT RATIO URINE    TSH WITH REFLEX TO FT4    Subsequent Annual Wellness Visit - Includes PPPS ()   2. Aortic valve stenosis, etiology of cardiac valve disease unspecified  Stable  Last Echo showed mild Stenosis  Asymptomatic  Subsequent Annual Wellness Visit - Includes PPPS ()   3. Mixed conductive and sensorineural hearing loss of both ears  Stable  Ongoing since 40s  Would need surgery to determine cause, no known treatment currently.   Subsequent Annual Wellness Visit - Includes PPPS ()   4. Type 2 diabetes mellitus without complication, without long-term current use of insulin (HCC)  Stable  Stopped testing due to price of testing supplies  Due for labs  Continue metformin  mg daily  On ASA, ACE, and statin  CBC WITH DIFFERENTIAL    Comp Metabolic Panel    HEMOGLOBIN A1C    MICROALBUMIN CREAT RATIO URINE    Lipid Profile    Subsequent Annual Wellness Visit - Includes PPPS ()   5. Hypercholesteremia  Stable  Due for labs  Continue lovastatin 10 mg daily  Lipid Profile    Subsequent Annual Wellness Visit - Includes PPPS ()   6. Vitamin D deficiency  Stable  Continue Vit D 2000 IU daily  VITAMIN D,25 HYDROXY    Subsequent Annual Wellness Visit - Includes PPPS ()   7. Age-related osteoporosis without current pathological fracture  Stable  Has been on fosamax starting 2017, due for repeat bone density next year  Continue fosamax weekly  Continue vitamin D and calcium daily  Subsequent Annual Wellness Visit - Includes PPPS ()   8. Mixed stress and urge urinary incontinence  Unstable  Wearing pads  Does not want to do therapy sessions  due to cost at this time.   Subsequent Annual Wellness Visit - Includes PPPS ()   9. Encounter for hepatitis C screening test for low risk patient  HEP C VIRUS ANTIBODY    Subsequent Annual Wellness Visit - Includes PPPS ()         Services suggested: No services needed at this time  Health Care Screening: Age-appropriate preventive services recommended by USPTF and ACIP covered by Medicare were discussed today. Services ordered if indicated and agreed upon by the patient.  Referrals offered: Community-based lifestyle interventions to reduce health risks and promote self-management and wellness, fall prevention, nutrition, physical activity, tobacco-use cessation, weight loss, and mental health services as per orders if indicated.    Discussion today about general wellness and lifestyle habits:    · Prevent falls and reduce trip hazards; Cautioned about securing or removing rugs.  · Have a working fire alarm and carbon monoxide detector;   · Engage in regular physical activity and social activities     Follow-up: Return in about 1 year (around 10/7/2021).

## 2020-10-08 RX ORDER — LISINOPRIL 10 MG/1
TABLET ORAL
Qty: 90 TAB | Refills: 3 | Status: SHIPPED | OUTPATIENT
Start: 2020-10-08 | End: 2020-10-08 | Stop reason: SDUPTHER

## 2020-10-08 RX ORDER — LISINOPRIL 10 MG/1
10 TABLET ORAL
Qty: 90 TAB | Refills: 1 | OUTPATIENT
Start: 2020-10-08

## 2020-10-08 RX ORDER — METFORMIN HYDROCHLORIDE 500 MG/1
500 TABLET, EXTENDED RELEASE ORAL DAILY
Qty: 90 TAB | Refills: 3 | Status: SHIPPED | OUTPATIENT
Start: 2020-10-08 | End: 2021-10-10

## 2020-10-20 ENCOUNTER — HOSPITAL ENCOUNTER (OUTPATIENT)
Dept: LAB | Facility: MEDICAL CENTER | Age: 75
End: 2020-10-20
Attending: NURSE PRACTITIONER
Payer: MEDICARE

## 2020-10-20 DIAGNOSIS — Z11.59 ENCOUNTER FOR HEPATITIS C SCREENING TEST FOR LOW RISK PATIENT: ICD-10-CM

## 2020-10-20 DIAGNOSIS — E78.00 HYPERCHOLESTEREMIA: ICD-10-CM

## 2020-10-20 DIAGNOSIS — I10 ESSENTIAL HYPERTENSION: ICD-10-CM

## 2020-10-20 DIAGNOSIS — E55.9 VITAMIN D DEFICIENCY: ICD-10-CM

## 2020-10-20 DIAGNOSIS — E11.9 TYPE 2 DIABETES MELLITUS WITHOUT COMPLICATION, WITHOUT LONG-TERM CURRENT USE OF INSULIN (HCC): ICD-10-CM

## 2020-10-20 LAB
25(OH)D3 SERPL-MCNC: 48 NG/ML (ref 30–100)
ALBUMIN SERPL BCP-MCNC: 4.2 G/DL (ref 3.2–4.9)
ALBUMIN/GLOB SERPL: 1.4 G/DL
ALP SERPL-CCNC: 40 U/L (ref 30–99)
ALT SERPL-CCNC: 9 U/L (ref 2–50)
ANION GAP SERPL CALC-SCNC: 8 MMOL/L (ref 7–16)
AST SERPL-CCNC: 14 U/L (ref 12–45)
BASOPHILS # BLD AUTO: 0.7 % (ref 0–1.8)
BASOPHILS # BLD: 0.04 K/UL (ref 0–0.12)
BILIRUB SERPL-MCNC: 0.4 MG/DL (ref 0.1–1.5)
BUN SERPL-MCNC: 13 MG/DL (ref 8–22)
CALCIUM SERPL-MCNC: 9 MG/DL (ref 8.4–10.2)
CHLORIDE SERPL-SCNC: 106 MMOL/L (ref 96–112)
CHOLEST SERPL-MCNC: 182 MG/DL (ref 100–199)
CO2 SERPL-SCNC: 23 MMOL/L (ref 20–33)
CREAT SERPL-MCNC: 0.67 MG/DL (ref 0.5–1.4)
CREAT UR-MCNC: 24.23 MG/DL
EOSINOPHIL # BLD AUTO: 0.15 K/UL (ref 0–0.51)
EOSINOPHIL NFR BLD: 2.5 % (ref 0–6.9)
ERYTHROCYTE [DISTWIDTH] IN BLOOD BY AUTOMATED COUNT: 41.2 FL (ref 35.9–50)
EST. AVERAGE GLUCOSE BLD GHB EST-MCNC: 134 MG/DL
FASTING STATUS PATIENT QL REPORTED: NORMAL
GLOBULIN SER CALC-MCNC: 2.9 G/DL (ref 1.9–3.5)
GLUCOSE SERPL-MCNC: 109 MG/DL (ref 65–99)
HBA1C MFR BLD: 6.3 % (ref 0–5.6)
HCT VFR BLD AUTO: 39.8 % (ref 37–47)
HCV AB SER QL: NORMAL
HDLC SERPL-MCNC: 63 MG/DL
HGB BLD-MCNC: 13.7 G/DL (ref 12–16)
IMM GRANULOCYTES # BLD AUTO: 0.01 K/UL (ref 0–0.11)
IMM GRANULOCYTES NFR BLD AUTO: 0.2 % (ref 0–0.9)
LDLC SERPL CALC-MCNC: 101 MG/DL
LYMPHOCYTES # BLD AUTO: 1.92 K/UL (ref 1–4.8)
LYMPHOCYTES NFR BLD: 32.1 % (ref 22–41)
MCH RBC QN AUTO: 30.5 PG (ref 27–33)
MCHC RBC AUTO-ENTMCNC: 34.4 G/DL (ref 33.6–35)
MCV RBC AUTO: 88.6 FL (ref 81.4–97.8)
MICROALBUMIN UR-MCNC: <1.2 MG/DL
MICROALBUMIN/CREAT UR: NORMAL MG/G (ref 0–30)
MONOCYTES # BLD AUTO: 0.39 K/UL (ref 0–0.85)
MONOCYTES NFR BLD AUTO: 6.5 % (ref 0–13.4)
NEUTROPHILS # BLD AUTO: 3.48 K/UL (ref 2–7.15)
NEUTROPHILS NFR BLD: 58 % (ref 44–72)
NRBC # BLD AUTO: 0 K/UL
NRBC BLD-RTO: 0 /100 WBC
PLATELET # BLD AUTO: 203 K/UL (ref 164–446)
PMV BLD AUTO: 7.8 FL (ref 9–12.9)
POTASSIUM SERPL-SCNC: 4.6 MMOL/L (ref 3.6–5.5)
PROT SERPL-MCNC: 7.1 G/DL (ref 6–8.2)
RBC # BLD AUTO: 4.49 M/UL (ref 4.2–5.4)
SODIUM SERPL-SCNC: 137 MMOL/L (ref 135–145)
TRIGL SERPL-MCNC: 91 MG/DL (ref 0–149)
TSH SERPL DL<=0.005 MIU/L-ACNC: 1.67 UIU/ML (ref 0.38–5.33)
WBC # BLD AUTO: 6 K/UL (ref 4.8–10.8)

## 2020-10-20 PROCEDURE — 82570 ASSAY OF URINE CREATININE: CPT

## 2020-10-20 PROCEDURE — 82306 VITAMIN D 25 HYDROXY: CPT

## 2020-10-20 PROCEDURE — 80061 LIPID PANEL: CPT

## 2020-10-20 PROCEDURE — 86803 HEPATITIS C AB TEST: CPT

## 2020-10-20 PROCEDURE — 83036 HEMOGLOBIN GLYCOSYLATED A1C: CPT

## 2020-10-20 PROCEDURE — 84443 ASSAY THYROID STIM HORMONE: CPT

## 2020-10-20 PROCEDURE — 82043 UR ALBUMIN QUANTITATIVE: CPT

## 2020-10-20 PROCEDURE — 85025 COMPLETE CBC W/AUTO DIFF WBC: CPT

## 2020-10-20 PROCEDURE — 80053 COMPREHEN METABOLIC PANEL: CPT

## 2020-10-20 PROCEDURE — 36415 COLL VENOUS BLD VENIPUNCTURE: CPT

## 2020-12-02 ENCOUNTER — PATIENT OUTREACH (OUTPATIENT)
Dept: HEALTH INFORMATION MANAGEMENT | Facility: OTHER | Age: 75
End: 2020-12-02

## 2020-12-02 NOTE — PROGRESS NOTES
Welcome Call for [facility] plan    Outcome:   Left Message Please transfer to Patient Outreach Team at [phone] when patient returns call.     Attempt # 1  -aep

## 2021-01-15 DIAGNOSIS — Z23 NEED FOR VACCINATION: ICD-10-CM

## 2021-06-18 DIAGNOSIS — M81.0 AGE-RELATED OSTEOPOROSIS WITHOUT CURRENT PATHOLOGICAL FRACTURE: ICD-10-CM

## 2021-06-18 RX ORDER — ALENDRONATE SODIUM 70 MG/1
TABLET ORAL
Qty: 12 TABLET | Refills: 1 | Status: SHIPPED | OUTPATIENT
Start: 2021-06-18 | End: 2022-01-05

## 2021-07-08 DIAGNOSIS — E78.00 HYPERCHOLESTEREMIA: ICD-10-CM

## 2021-07-08 DIAGNOSIS — E11.9 TYPE 2 DIABETES MELLITUS WITHOUT COMPLICATION, WITHOUT LONG-TERM CURRENT USE OF INSULIN (HCC): ICD-10-CM

## 2021-07-08 RX ORDER — LOVASTATIN 10 MG/1
TABLET ORAL
Qty: 90 TABLET | Refills: 0 | Status: SHIPPED | OUTPATIENT
Start: 2021-07-08 | End: 2021-09-29

## 2021-07-08 NOTE — TELEPHONE ENCOUNTER
Received request via: Pharmacy    Was the patient seen in the last year in this department? Yes     Does the patient have an active prescription (recently filled or refills available) for medication(s) requested? No

## 2021-08-20 ENCOUNTER — TELEPHONE (OUTPATIENT)
Dept: MEDICAL GROUP | Facility: MEDICAL CENTER | Age: 76
End: 2021-08-20

## 2021-08-20 DIAGNOSIS — E11.9 TYPE 2 DIABETES MELLITUS WITHOUT COMPLICATION, WITHOUT LONG-TERM CURRENT USE OF INSULIN (HCC): ICD-10-CM

## 2021-08-20 DIAGNOSIS — E78.00 HYPERCHOLESTEREMIA: ICD-10-CM

## 2021-08-20 DIAGNOSIS — I10 ESSENTIAL HYPERTENSION: ICD-10-CM

## 2021-08-20 DIAGNOSIS — E55.9 VITAMIN D DEFICIENCY: ICD-10-CM

## 2021-08-20 DIAGNOSIS — M81.0 AGE-RELATED OSTEOPOROSIS WITHOUT CURRENT PATHOLOGICAL FRACTURE: ICD-10-CM

## 2021-08-20 NOTE — TELEPHONE ENCOUNTER
1. Caller Name: Kacey Whelan  Call Back Number: [phone] (home)     Pt would like to know if she should get labs done before her annual in October 13th. Her last labs were done 10/20/2020. Please advise. Pt requesting call back.

## 2021-09-28 DIAGNOSIS — E11.9 TYPE 2 DIABETES MELLITUS WITHOUT COMPLICATION, WITHOUT LONG-TERM CURRENT USE OF INSULIN (HCC): ICD-10-CM

## 2021-09-28 DIAGNOSIS — E78.00 HYPERCHOLESTEREMIA: ICD-10-CM

## 2021-09-29 RX ORDER — LOVASTATIN 10 MG/1
TABLET ORAL
Qty: 90 TABLET | Refills: 0 | Status: SHIPPED | OUTPATIENT
Start: 2021-09-29 | End: 2021-12-20

## 2021-09-30 NOTE — TELEPHONE ENCOUNTER
Refill done. Patient is due for annual appointment and labs. Please have patient schedule.  CHAVA Garcias.

## 2021-10-05 ENCOUNTER — HOSPITAL ENCOUNTER (OUTPATIENT)
Dept: LAB | Facility: MEDICAL CENTER | Age: 76
End: 2021-10-05
Attending: NURSE PRACTITIONER
Payer: MEDICARE

## 2021-10-05 DIAGNOSIS — E11.9 TYPE 2 DIABETES MELLITUS WITHOUT COMPLICATION, WITHOUT LONG-TERM CURRENT USE OF INSULIN (HCC): ICD-10-CM

## 2021-10-05 DIAGNOSIS — E78.00 HYPERCHOLESTEREMIA: ICD-10-CM

## 2021-10-05 DIAGNOSIS — E55.9 VITAMIN D DEFICIENCY: ICD-10-CM

## 2021-10-05 DIAGNOSIS — M81.0 AGE-RELATED OSTEOPOROSIS WITHOUT CURRENT PATHOLOGICAL FRACTURE: ICD-10-CM

## 2021-10-05 DIAGNOSIS — I10 ESSENTIAL HYPERTENSION: ICD-10-CM

## 2021-10-05 LAB
25(OH)D3 SERPL-MCNC: 47 NG/ML (ref 30–100)
ALBUMIN SERPL BCP-MCNC: 4.4 G/DL (ref 3.2–4.9)
ALBUMIN/GLOB SERPL: 1.4 G/DL
ALP SERPL-CCNC: 43 U/L (ref 30–99)
ALT SERPL-CCNC: 8 U/L (ref 2–50)
ANION GAP SERPL CALC-SCNC: 13 MMOL/L (ref 7–16)
AST SERPL-CCNC: 14 U/L (ref 12–45)
BASOPHILS # BLD AUTO: 0.6 % (ref 0–1.8)
BASOPHILS # BLD: 0.04 K/UL (ref 0–0.12)
BILIRUB SERPL-MCNC: 0.5 MG/DL (ref 0.1–1.5)
BUN SERPL-MCNC: 15 MG/DL (ref 8–22)
CALCIUM SERPL-MCNC: 9 MG/DL (ref 8.4–10.2)
CHLORIDE SERPL-SCNC: 99 MMOL/L (ref 96–112)
CHOLEST SERPL-MCNC: 188 MG/DL (ref 100–199)
CO2 SERPL-SCNC: 24 MMOL/L (ref 20–33)
CREAT SERPL-MCNC: 0.71 MG/DL (ref 0.5–1.4)
CREAT UR-MCNC: 69.33 MG/DL
EOSINOPHIL # BLD AUTO: 0.16 K/UL (ref 0–0.51)
EOSINOPHIL NFR BLD: 2.5 % (ref 0–6.9)
ERYTHROCYTE [DISTWIDTH] IN BLOOD BY AUTOMATED COUNT: 41 FL (ref 35.9–50)
EST. AVERAGE GLUCOSE BLD GHB EST-MCNC: 137 MG/DL
FASTING STATUS PATIENT QL REPORTED: NORMAL
GLOBULIN SER CALC-MCNC: 3.1 G/DL (ref 1.9–3.5)
GLUCOSE SERPL-MCNC: 119 MG/DL (ref 65–99)
HBA1C MFR BLD: 6.4 % (ref 4–5.6)
HCT VFR BLD AUTO: 40.5 % (ref 37–47)
HDLC SERPL-MCNC: 63 MG/DL
HGB BLD-MCNC: 13.9 G/DL (ref 12–16)
IMM GRANULOCYTES # BLD AUTO: 0.02 K/UL (ref 0–0.11)
IMM GRANULOCYTES NFR BLD AUTO: 0.3 % (ref 0–0.9)
LDLC SERPL CALC-MCNC: 99 MG/DL
LYMPHOCYTES # BLD AUTO: 1.74 K/UL (ref 1–4.8)
LYMPHOCYTES NFR BLD: 27.4 % (ref 22–41)
MCH RBC QN AUTO: 31 PG (ref 27–33)
MCHC RBC AUTO-ENTMCNC: 34.3 G/DL (ref 33.6–35)
MCV RBC AUTO: 90.4 FL (ref 81.4–97.8)
MICROALBUMIN UR-MCNC: 2.4 MG/DL
MICROALBUMIN/CREAT UR: 35 MG/G (ref 0–30)
MONOCYTES # BLD AUTO: 0.5 K/UL (ref 0–0.85)
MONOCYTES NFR BLD AUTO: 7.9 % (ref 0–13.4)
NEUTROPHILS # BLD AUTO: 3.89 K/UL (ref 2–7.15)
NEUTROPHILS NFR BLD: 61.3 % (ref 44–72)
NRBC # BLD AUTO: 0 K/UL
NRBC BLD-RTO: 0 /100 WBC
PLATELET # BLD AUTO: 210 K/UL (ref 164–446)
PMV BLD AUTO: 7.7 FL (ref 9–12.9)
POTASSIUM SERPL-SCNC: 4.7 MMOL/L (ref 3.6–5.5)
PROT SERPL-MCNC: 7.5 G/DL (ref 6–8.2)
RBC # BLD AUTO: 4.48 M/UL (ref 4.2–5.4)
SODIUM SERPL-SCNC: 136 MMOL/L (ref 135–145)
TRIGL SERPL-MCNC: 132 MG/DL (ref 0–149)
TSH SERPL DL<=0.005 MIU/L-ACNC: 0.89 UIU/ML (ref 0.38–5.33)
WBC # BLD AUTO: 6.4 K/UL (ref 4.8–10.8)

## 2021-10-05 PROCEDURE — 82306 VITAMIN D 25 HYDROXY: CPT

## 2021-10-05 PROCEDURE — 83036 HEMOGLOBIN GLYCOSYLATED A1C: CPT | Mod: GA

## 2021-10-05 PROCEDURE — 80061 LIPID PANEL: CPT

## 2021-10-05 PROCEDURE — 85025 COMPLETE CBC W/AUTO DIFF WBC: CPT

## 2021-10-05 PROCEDURE — 84443 ASSAY THYROID STIM HORMONE: CPT

## 2021-10-05 PROCEDURE — 80053 COMPREHEN METABOLIC PANEL: CPT

## 2021-10-05 PROCEDURE — 82043 UR ALBUMIN QUANTITATIVE: CPT

## 2021-10-05 PROCEDURE — 36415 COLL VENOUS BLD VENIPUNCTURE: CPT

## 2021-10-05 PROCEDURE — 82570 ASSAY OF URINE CREATININE: CPT

## 2021-10-06 DIAGNOSIS — E11.9 TYPE 2 DIABETES MELLITUS WITHOUT COMPLICATION, WITHOUT LONG-TERM CURRENT USE OF INSULIN (HCC): ICD-10-CM

## 2021-10-10 RX ORDER — METFORMIN HYDROCHLORIDE 500 MG/1
500 TABLET, EXTENDED RELEASE ORAL DAILY
Qty: 90 TABLET | Refills: 0 | Status: SHIPPED | OUTPATIENT
Start: 2021-10-10 | End: 2022-04-07 | Stop reason: SDUPTHER

## 2021-10-11 SDOH — ECONOMIC STABILITY: HOUSING INSECURITY
IN THE LAST 12 MONTHS, WAS THERE A TIME WHEN YOU DID NOT HAVE A STEADY PLACE TO SLEEP OR SLEPT IN A SHELTER (INCLUDING NOW)?: NO

## 2021-10-11 SDOH — ECONOMIC STABILITY: INCOME INSECURITY: IN THE LAST 12 MONTHS, WAS THERE A TIME WHEN YOU WERE NOT ABLE TO PAY THE MORTGAGE OR RENT ON TIME?: NO

## 2021-10-11 SDOH — HEALTH STABILITY: PHYSICAL HEALTH: ON AVERAGE, HOW MANY MINUTES DO YOU ENGAGE IN EXERCISE AT THIS LEVEL?: 30 MIN

## 2021-10-11 SDOH — ECONOMIC STABILITY: FOOD INSECURITY: WITHIN THE PAST 12 MONTHS, YOU WORRIED THAT YOUR FOOD WOULD RUN OUT BEFORE YOU GOT MONEY TO BUY MORE.: NEVER TRUE

## 2021-10-11 SDOH — ECONOMIC STABILITY: FOOD INSECURITY: WITHIN THE PAST 12 MONTHS, THE FOOD YOU BOUGHT JUST DIDN'T LAST AND YOU DIDN'T HAVE MONEY TO GET MORE.: NEVER TRUE

## 2021-10-11 SDOH — ECONOMIC STABILITY: TRANSPORTATION INSECURITY
IN THE PAST 12 MONTHS, HAS LACK OF RELIABLE TRANSPORTATION KEPT YOU FROM MEDICAL APPOINTMENTS, MEETINGS, WORK OR FROM GETTING THINGS NEEDED FOR DAILY LIVING?: PATIENT DECLINED

## 2021-10-11 SDOH — HEALTH STABILITY: MENTAL HEALTH
STRESS IS WHEN SOMEONE FEELS TENSE, NERVOUS, ANXIOUS, OR CAN'T SLEEP AT NIGHT BECAUSE THEIR MIND IS TROUBLED. HOW STRESSED ARE YOU?: ONLY A LITTLE

## 2021-10-11 SDOH — HEALTH STABILITY: PHYSICAL HEALTH: ON AVERAGE, HOW MANY DAYS PER WEEK DO YOU ENGAGE IN MODERATE TO STRENUOUS EXERCISE (LIKE A BRISK WALK)?: 7 DAYS

## 2021-10-11 SDOH — ECONOMIC STABILITY: TRANSPORTATION INSECURITY
IN THE PAST 12 MONTHS, HAS THE LACK OF TRANSPORTATION KEPT YOU FROM MEDICAL APPOINTMENTS OR FROM GETTING MEDICATIONS?: PATIENT DECLINED

## 2021-10-11 SDOH — ECONOMIC STABILITY: HOUSING INSECURITY

## 2021-10-11 SDOH — ECONOMIC STABILITY: TRANSPORTATION INSECURITY
IN THE PAST 12 MONTHS, HAS LACK OF TRANSPORTATION KEPT YOU FROM MEETINGS, WORK, OR FROM GETTING THINGS NEEDED FOR DAILY LIVING?: PATIENT DECLINED

## 2021-10-11 SDOH — ECONOMIC STABILITY: INCOME INSECURITY: HOW HARD IS IT FOR YOU TO PAY FOR THE VERY BASICS LIKE FOOD, HOUSING, MEDICAL CARE, AND HEATING?: PATIENT DECLINED

## 2021-10-11 ASSESSMENT — SOCIAL DETERMINANTS OF HEALTH (SDOH)
HOW OFTEN DO YOU ATTEND CHURCH OR RELIGIOUS SERVICES?: PATIENT DECLINED
WITHIN THE PAST 12 MONTHS, YOU WORRIED THAT YOUR FOOD WOULD RUN OUT BEFORE YOU GOT THE MONEY TO BUY MORE: NEVER TRUE
HOW OFTEN DO YOU HAVE A DRINK CONTAINING ALCOHOL: NEVER
IN A TYPICAL WEEK, HOW MANY TIMES DO YOU TALK ON THE PHONE WITH FAMILY, FRIENDS, OR NEIGHBORS?: THREE TIMES A WEEK
HOW OFTEN DO YOU GET TOGETHER WITH FRIENDS OR RELATIVES?: PATIENT DECLINED
HOW OFTEN DO YOU ATTENT MEETINGS OF THE CLUB OR ORGANIZATION YOU BELONG TO?: NEVER
HOW HARD IS IT FOR YOU TO PAY FOR THE VERY BASICS LIKE FOOD, HOUSING, MEDICAL CARE, AND HEATING?: PATIENT DECLINED
HOW OFTEN DO YOU GET TOGETHER WITH FRIENDS OR RELATIVES?: PATIENT DECLINED
HOW MANY DRINKS CONTAINING ALCOHOL DO YOU HAVE ON A TYPICAL DAY WHEN YOU ARE DRINKING: PATIENT DECLINED
IN A TYPICAL WEEK, HOW MANY TIMES DO YOU TALK ON THE PHONE WITH FAMILY, FRIENDS, OR NEIGHBORS?: THREE TIMES A WEEK
HOW OFTEN DO YOU ATTENT MEETINGS OF THE CLUB OR ORGANIZATION YOU BELONG TO?: NEVER
DO YOU BELONG TO ANY CLUBS OR ORGANIZATIONS SUCH AS CHURCH GROUPS UNIONS, FRATERNAL OR ATHLETIC GROUPS, OR SCHOOL GROUPS?: NO
HOW OFTEN DO YOU HAVE SIX OR MORE DRINKS ON ONE OCCASION: NEVER
HOW OFTEN DO YOU ATTEND CHURCH OR RELIGIOUS SERVICES?: PATIENT DECLINED
DO YOU BELONG TO ANY CLUBS OR ORGANIZATIONS SUCH AS CHURCH GROUPS UNIONS, FRATERNAL OR ATHLETIC GROUPS, OR SCHOOL GROUPS?: NO

## 2021-10-11 ASSESSMENT — LIFESTYLE VARIABLES
HOW MANY STANDARD DRINKS CONTAINING ALCOHOL DO YOU HAVE ON A TYPICAL DAY: PATIENT DECLINED
HOW OFTEN DO YOU HAVE A DRINK CONTAINING ALCOHOL: NEVER
HOW OFTEN DO YOU HAVE SIX OR MORE DRINKS ON ONE OCCASION: NEVER

## 2021-10-13 ENCOUNTER — OFFICE VISIT (OUTPATIENT)
Dept: MEDICAL GROUP | Facility: MEDICAL CENTER | Age: 76
End: 2021-10-13
Payer: MEDICARE

## 2021-10-13 VITALS
OXYGEN SATURATION: 95 % | WEIGHT: 137 LBS | TEMPERATURE: 97.2 F | BODY MASS INDEX: 25.06 KG/M2 | DIASTOLIC BLOOD PRESSURE: 84 MMHG | SYSTOLIC BLOOD PRESSURE: 134 MMHG | HEART RATE: 78 BPM

## 2021-10-13 DIAGNOSIS — N39.46 MIXED STRESS AND URGE URINARY INCONTINENCE: ICD-10-CM

## 2021-10-13 DIAGNOSIS — H90.6 MIXED CONDUCTIVE AND SENSORINEURAL HEARING LOSS OF BOTH EARS: ICD-10-CM

## 2021-10-13 DIAGNOSIS — I35.0 AORTIC VALVE STENOSIS, ETIOLOGY OF CARDIAC VALVE DISEASE UNSPECIFIED: ICD-10-CM

## 2021-10-13 DIAGNOSIS — E78.00 HYPERCHOLESTEREMIA: ICD-10-CM

## 2021-10-13 DIAGNOSIS — E11.9 TYPE 2 DIABETES MELLITUS WITHOUT COMPLICATION, WITHOUT LONG-TERM CURRENT USE OF INSULIN (HCC): ICD-10-CM

## 2021-10-13 DIAGNOSIS — I10 PRIMARY HYPERTENSION: ICD-10-CM

## 2021-10-13 DIAGNOSIS — Z00.00 MEDICARE ANNUAL WELLNESS VISIT, SUBSEQUENT: ICD-10-CM

## 2021-10-13 DIAGNOSIS — R80.9 MICROALBUMINURIA: ICD-10-CM

## 2021-10-13 DIAGNOSIS — Z12.31 SCREENING MAMMOGRAM, ENCOUNTER FOR: ICD-10-CM

## 2021-10-13 DIAGNOSIS — M81.0 AGE-RELATED OSTEOPOROSIS WITHOUT CURRENT PATHOLOGICAL FRACTURE: ICD-10-CM

## 2021-10-13 DIAGNOSIS — E55.9 VITAMIN D DEFICIENCY: ICD-10-CM

## 2021-10-13 PROCEDURE — G0439 PPPS, SUBSEQ VISIT: HCPCS | Performed by: NURSE PRACTITIONER

## 2021-10-13 RX ORDER — OXYBUTYNIN CHLORIDE 5 MG/1
5 TABLET, EXTENDED RELEASE ORAL DAILY
Qty: 30 TABLET | Refills: 3 | Status: SHIPPED | OUTPATIENT
Start: 2021-10-13 | End: 2021-10-14 | Stop reason: SDUPTHER

## 2021-10-13 RX ORDER — OXYBUTYNIN CHLORIDE 5 MG/1
5 TABLET, EXTENDED RELEASE ORAL DAILY
Qty: 90 TABLET | Refills: 3 | Status: SHIPPED | OUTPATIENT
Start: 2021-10-13 | End: 2021-10-13 | Stop reason: SDUPTHER

## 2021-10-13 ASSESSMENT — FIBROSIS 4 INDEX: FIB4 SCORE: 1.744196726926817227

## 2021-10-13 ASSESSMENT — ACTIVITIES OF DAILY LIVING (ADL)
BATHING_REQUIRES_ASSISTANCE: 0
TRANSPORTATION COMMENTS: PT DOES NOT DRIVE

## 2021-10-13 ASSESSMENT — PATIENT HEALTH QUESTIONNAIRE - PHQ9: CLINICAL INTERPRETATION OF PHQ2 SCORE: 0

## 2021-10-13 ASSESSMENT — ENCOUNTER SYMPTOMS: GENERAL WELL-BEING: GOOD

## 2021-10-13 NOTE — PROGRESS NOTES
Chief Complaint   Patient presents with   • Annual Wellness Visit         HPI:  Kacey is a 76 y.o. here for Medicare Annual Wellness Visit      Patient Active Problem List    Diagnosis Date Noted   • Mixed stress and urge urinary incontinence 10/02/2019   • Age-related osteoporosis without current pathological fracture 06/26/2017   • Vitamin D deficiency 03/30/2017   • Type 2 diabetes mellitus without complication, without long-term current use of insulin (HCC) 10/14/2016   • Hypercholesteremia 10/14/2016   • HTN (hypertension) 03/06/2015   • Aortic stenosis 03/06/2015   • Hearing loss 03/06/2015       Current Outpatient Medications   Medication Sig Dispense Refill   • Magnesium Gluconate (MAGNESIUM 27 PO) Take  by mouth.     • metFORMIN ER (GLUCOPHAGE XR) 500 MG TABLET SR 24 HR Take 1 Tablet by mouth every day. 90 Tablet 0   • lovastatin (MEVACOR) 10 MG tablet Take 1 tablet by mouth daily 90 Tablet 0   • alendronate (FOSAMAX) 70 MG Tab TAKE 1 TABLET BY MOUTH ONCE WEEKLY 12 tablet 1   • lisinopril (PRINIVIL) 10 MG Tab Take 1 Tab by mouth every day. 90 Tab 3   • Calcium Citrate-Vitamin D (CALCIUM + D PO) Take  by mouth.     • therapeutic multivitamin-minerals (THERAGRAN-M) Tab Take 1 Tab by mouth every day.     • aspirin 81 MG tablet Take 1 Tab by mouth every day. 100 Tab 3   • metFORMIN ER (GLUCOPHAGE XR) 500 MG TABLET SR 24 HR Take 1 tablet by mouth once daily 90 Tablet 0     No current facility-administered medications for this visit.        Patient is taking medications as noted in medication list.  Current supplements as per medication list.     Allergies: Latex    Current social contact/activities: friends and family, phone calls    Is patient current with immunizations? Yes.    She  reports that she has never smoked. She has never used smokeless tobacco. She reports previous alcohol use of about 1.2 oz of alcohol per week. She reports that she does not use drugs.  Counseling given: Not  Answered        DPA/Advanced directive: Patient does not have an Advanced Directive.  A packet and workshop information was given on Advanced Directives.    ROS:    Gait: Uses no assistive device   Ostomy: No   Other tubes: No   Amputations: No   Chronic oxygen use No   Last eye exam 3 years ago   Wears hearing aids: No   : Reports urinary leakage during the last 6 months that has not interfered at all with their daily activities or sleep.      Screening:        Depression Screening    Little interest or pleasure in doing things?  0 - not at all  Feeling down, depressed, or hopeless? 0 - not at all  Patient Health Questionnaire Score: 0    If depressive symptoms identified deferred to follow up visit unless specifically addressed in assessment and plan.    Interpretation of PHQ-9 Total Score   Score Severity   1-4 No Depression   5-9 Mild Depression   10-14 Moderate Depression   15-19 Moderately Severe Depression   20-27 Severe Depression    Screening for Cognitive Impairment    Three Minute Recall (captain, garden, picture)  2/3    Tonny clock face with all 12 numbers and set the hands to show 5 past 8.  Yes    If cognitive concerns identified, deferred for follow up unless specifically addressed in assessment and plan.    Fall Risk Assessment    Has the patient had two or more falls in the last year or any fall with injury in the last year?  No  If fall risk identified, deferred for follow up unless specifically addressed in assessment and plan.    Safety Assessment    Throw rugs on floor.  No  Handrails on all stairs.  Yes  Good lighting in all hallways.  Yes  Difficulty hearing.  Yes  Patient counseled about all safety risks that were identified.    Functional Assessment ADLs    Are there any barriers preventing you from cooking for yourself or meeting nutritional needs?  No.    Are there any barriers preventing you from driving safely or obtaining transportation?  Yes. Pt does not drive  Are there any barriers  preventing you from using a telephone or calling for help?  No.    Are there any barriers preventing you from shopping?  No.    Are there any barriers preventing you from taking care of your own finances?  No.    Are there any barriers preventing you from managing your medications?  No.    Are there any barriers preventing you from showering, bathing or dressing yourself?  No.    Are you currently engaging in any exercise or physical activity?  Yes.  Walking, stretching  What is your perception of your health?  Good.    Health Maintenance Summary                COLORECTAL CANCER SCREENING Overdue 1945     IMM DTaP/Tdap/Td Vaccine Overdue 10/12/1966     IMM ZOSTER VACCINES Overdue 10/12/1997     RETINAL SCREENING Overdue 10/24/2019      Done 10/24/2018 REFERRAL FOR RETINAL SCREENING EXAM     Patient has more history with this topic...    MAMMOGRAM Overdue 5/3/2021      Done 5/3/2019 MA-SCREENING MAMMO BILAT W/TOMOSYNTHESIS W/CAD     Patient has more history with this topic...    IMM INFLUENZA Overdue 9/1/2021      Done 9/26/2020 Imm Admin: Influenza Vaccine Adult HD     Patient has more history with this topic...    DIABETES MONOFILAMENT / LE EXAM Overdue 10/7/2021      Done 10/7/2020 [employer]: WORKFLOW - DIABETES - DIABETIC FOOT EXAM PERFORMED     Patient has more history with this topic...    Annual Wellness Visit Overdue 10/8/2021      Done 10/7/2020 SUBSEQUENT ANNUAL WELLNESS VISIT-INCLUDES PPPS ()     Patient has more history with this topic...    A1C SCREENING Next Due 4/5/2022      Done 10/5/2021 HEMOGLOBIN A1C     Patient has more history with this topic...    FASTING LIPID PROFILE Next Due 10/5/2022      Done 10/5/2021 LIPID PROFILE     Patient has more history with this topic...    URINE ACR / MICROALBUMIN Next Due 10/5/2022      Done 10/5/2021 MICROALBUMIN CREAT RATIO URINE     Patient has more history with this topic...    SERUM CREATININE Next Due 10/5/2022      Done 10/5/2021 COMP METABOLIC  PANEL     Patient has more history with this topic...    BONE DENSITY Next Due 10/8/2024      Done 10/8/2019 DS-BONE DENSITY STUDY (DEXA)     Patient has more history with this topic...          Patient Care Team:  NED Garcias as PCP - General (Family Medicine)  Hi Medina M.D. as Consulting Physician (Ophthalmology)  Zaire Brown M.D. (Otolaryngology)    Social History     Tobacco Use   • Smoking status: Never Smoker   • Smokeless tobacco: Never Used   Vaping Use   • Vaping Use: Never used   Substance Use Topics   • Alcohol use: Not Currently     Alcohol/week: 1.2 oz     Types: 2 Glasses of wine per week   • Drug use: No     Family History   Problem Relation Age of Onset   • Cancer Mother         breast cancer   • Heart Disease Father 46        MI   • Heart Attack Father    • Heart Disease Sister         2 heart surgery   • Diabetes Brother    • Cancer Brother         brain tumor   • Heart Disease Brother         HEART STENT   • Diabetes Brother    • Diabetes Brother    • Cancer Brother         anal     She  has a past medical history of Diabetes (HCC), Hearing loss, Heart murmur, Hyperlipidemia, Hypertension, and Osteoporosis.   Past Surgical History:   Procedure Laterality Date   • PRIMARY C SECTION      3 c section   • TONSILLECTOMY             Exam:     /84 (BP Location: Right arm, Patient Position: Sitting, BP Cuff Size: )   Pulse 78   Temp 36.2 °C (97.2 °F) (Temporal)   Wt 62.1 kg (137 lb)   SpO2 95%  Body mass index is 25.06 kg/m².    Hearing severe hearing loss.    Dentition good  Alert, oriented in no acute distress.  Eye contact is good, speech goal directed, affect calm      Assessment and Plan. The following treatment and monitoring plan is recommended:    1. Medicare annual wellness visit, subsequent     2. Type 2 diabetes mellitus without complication, without long-term current use of insulin (HCC)  Stable  A1C 6.4  Continue Metformin  mg daily  Diet and  exercise  MICROALBUMIN CREAT RATIO URINE   3. Mixed stress and urge urinary incontinence  Unstable  Trial Ditropan daily  oxybutynin SR (DITROPAN-XL) 5 MG TABLET SR 24 HR    DISCONTINUED: Mirabegron ER 25 MG TABLET SR 24 HR   4. Microalbuminuria  Unstable  BP slightly elevated in office today, but fairly well controlled  Diabetes well controll  Repeat 1 month  MICROALBUMIN CREAT RATIO URINE   5. Primary hypertension  Stable  Continue lisinopril 10 mg daily  MICROALBUMIN CREAT RATIO URINE   6. Age-related osteoporosis without current pathological fracture  Stable  Due for repeat bone density  Due to stop fosamax feb 2022, will see how DEXA looks before stopping  DS-BONE DENSITY STUDY (DEXA)   7. Hypercholesteremia  Stable  Continue lovastatin daily   8. Aortic valve stenosis, etiology of cardiac valve disease unspecified  Stable  No longer following with cardiology  Asymptomatic  She does not feel she needs to see them unless something changes   9. Mixed conductive and sensorineural hearing loss of both ears  Unstable  Chronic  Did not tolerate hearing aids   10. Vitamin D deficiency  Stable  Continue daily supplementation   11. Screening mammogram, encounter for  MA-SCREENING MAMMO BILAT W/CAD         Services suggested: No services needed at this time  Health Care Screening recommendations as per orders if indicated.  Referrals offered: PT/OT/Nutrition counseling/Behavioral Health/Smoking cessation as per orders if indicated.    Discussion today about general wellness and lifestyle habits:    · Prevent falls and reduce trip hazards; Cautioned about securing or removing rugs.  · Have a working fire alarm and carbon monoxide detector;   · Engage in regular physical activity and social activities       Follow-up: Return in about 1 year (around 10/13/2022).

## 2021-10-14 DIAGNOSIS — N39.46 MIXED STRESS AND URGE URINARY INCONTINENCE: ICD-10-CM

## 2021-10-14 NOTE — TELEPHONE ENCOUNTER
Received request via: Patient    Was the patient seen in the last year in this department? Yes    Does the patient have an active prescription (recently filled or refills available) for medication(s) requested? Yes. Requesting change to mail order due to cost.

## 2021-10-15 RX ORDER — OXYBUTYNIN CHLORIDE 5 MG/1
5 TABLET, EXTENDED RELEASE ORAL DAILY
Qty: 30 TABLET | Refills: 3 | Status: SHIPPED | OUTPATIENT
Start: 2021-10-15 | End: 2022-01-24

## 2021-10-28 ENCOUNTER — OFFICE VISIT (OUTPATIENT)
Dept: URGENT CARE | Facility: CLINIC | Age: 76
End: 2021-10-28
Payer: MEDICARE

## 2021-10-28 ENCOUNTER — HOSPITAL ENCOUNTER (OUTPATIENT)
Facility: MEDICAL CENTER | Age: 76
End: 2021-10-28
Attending: FAMILY MEDICINE
Payer: MEDICARE

## 2021-10-28 VITALS
RESPIRATION RATE: 16 BRPM | OXYGEN SATURATION: 98 % | SYSTOLIC BLOOD PRESSURE: 140 MMHG | HEART RATE: 58 BPM | DIASTOLIC BLOOD PRESSURE: 72 MMHG | TEMPERATURE: 97.3 F | HEIGHT: 64 IN | BODY MASS INDEX: 23.39 KG/M2 | WEIGHT: 137 LBS

## 2021-10-28 DIAGNOSIS — R30.0 DYSURIA: ICD-10-CM

## 2021-10-28 DIAGNOSIS — E11.9 TYPE 2 DIABETES MELLITUS WITHOUT COMPLICATION, WITHOUT LONG-TERM CURRENT USE OF INSULIN (HCC): ICD-10-CM

## 2021-10-28 LAB
APPEARANCE UR: CLEAR
BILIRUB UR STRIP-MCNC: NEGATIVE MG/DL
COLOR UR AUTO: YELLOW
GLUCOSE UR STRIP.AUTO-MCNC: NEGATIVE MG/DL
KETONES UR STRIP.AUTO-MCNC: NEGATIVE MG/DL
LEUKOCYTE ESTERASE UR QL STRIP.AUTO: NORMAL
NITRITE UR QL STRIP.AUTO: NEGATIVE
PH UR STRIP.AUTO: 6 [PH] (ref 5–8)
PROT UR QL STRIP: NEGATIVE MG/DL
RBC UR QL AUTO: NORMAL
SP GR UR STRIP.AUTO: 1.01
UROBILINOGEN UR STRIP-MCNC: 0.2 MG/DL

## 2021-10-28 PROCEDURE — 87086 URINE CULTURE/COLONY COUNT: CPT

## 2021-10-28 PROCEDURE — 81002 URINALYSIS NONAUTO W/O SCOPE: CPT | Performed by: FAMILY MEDICINE

## 2021-10-28 PROCEDURE — 99213 OFFICE O/P EST LOW 20 MIN: CPT | Performed by: FAMILY MEDICINE

## 2021-10-28 RX ORDER — CEFDINIR 300 MG/1
300 CAPSULE ORAL 2 TIMES DAILY
Qty: 10 CAPSULE | Refills: 0 | Status: SHIPPED | OUTPATIENT
Start: 2021-10-28 | End: 2021-11-02

## 2021-10-28 ASSESSMENT — FIBROSIS 4 INDEX: FIB4 SCORE: 1.744196726926817227

## 2021-10-28 NOTE — PROGRESS NOTES
"Subjective     Kacey Whelan is a 76 y.o. female who presents with Dysuria      - This is a pleasant and nontoxic appearing 76 y.o. female with c/o 1 day w/ dysuria/freq, no NVFC. Feels well otherwise.     - Hx DM, last HgA1C was 6.4 on 10/21      ALLERGIES:  Latex     PMH:  Past Medical History:   Diagnosis Date   • Diabetes (HCC)    • Hearing loss    • Heart murmur    • Hyperlipidemia    • Hypertension    • Osteoporosis         PSH:  Past Surgical History:   Procedure Laterality Date   • PRIMARY C SECTION      3 c section   • TONSILLECTOMY         MEDS:    Current Outpatient Medications:   •  cefdinir (OMNICEF) 300 MG Cap, Take 1 Capsule by mouth 2 times a day for 5 days., Disp: 10 Capsule, Rfl: 0  •  oxybutynin SR (DITROPAN-XL) 5 MG TABLET SR 24 HR, Take 1 Tablet by mouth every day., Disp: 30 Tablet, Rfl: 3  •  metFORMIN ER (GLUCOPHAGE XR) 500 MG TABLET SR 24 HR, Take 1 Tablet by mouth every day., Disp: 90 Tablet, Rfl: 0  •  lovastatin (MEVACOR) 10 MG tablet, Take 1 tablet by mouth daily, Disp: 90 Tablet, Rfl: 0  •  alendronate (FOSAMAX) 70 MG Tab, TAKE 1 TABLET BY MOUTH ONCE WEEKLY, Disp: 12 tablet, Rfl: 1  •  lisinopril (PRINIVIL) 10 MG Tab, Take 1 Tab by mouth every day., Disp: 90 Tab, Rfl: 3  •  Calcium Citrate-Vitamin D (CALCIUM + D PO), Take  by mouth., Disp: , Rfl:   •  aspirin 81 MG tablet, Take 1 Tab by mouth every day., Disp: 100 Tab, Rfl: 3    ** I have documented what I find to be significant in regards to past medical, social, family and surgical history  in my HPI or under PMH/PSH/FH review section, otherwise it is noncontributory **           HPI    Review of Systems   Genitourinary: Positive for dysuria and frequency.   All other systems reviewed and are negative.             Objective     /72 (BP Location: Left arm, Patient Position: Sitting, BP Cuff Size: Adult)   Pulse (!) 58   Temp 36.3 °C (97.3 °F) (Temporal)   Resp 16   Ht 1.626 m (5' 4\")   Wt 62.1 kg (137 lb)   SpO2 98%   " BMI 23.52 kg/m²      Physical Exam  Vitals and nursing note reviewed.   Constitutional:       General: She is not in acute distress.     Appearance: Normal appearance. She is well-developed.   HENT:      Head: Normocephalic and atraumatic.   Eyes:      General: No scleral icterus.  Cardiovascular:      Heart sounds: Normal heart sounds. No murmur heard.     Pulmonary:      Effort: Pulmonary effort is normal. No respiratory distress.   Abdominal:      Palpations: Abdomen is soft.      Tenderness: There is no abdominal tenderness.   Skin:     Coloration: Skin is not jaundiced or pale.   Neurological:      Mental Status: She is alert.      Motor: No abnormal muscle tone.   Psychiatric:         Mood and Affect: Mood normal.         Behavior: Behavior normal.                             Assessment & Plan          1. Dysuria  POCT Urinalysis    URINE CULTURE(NEW)    cefdinir (OMNICEF) 300 MG Cap   2. Type 2 diabetes mellitus without complication, without long-term current use of insulin (HCC)         - Dx, plan & d/c instructions discussed   - Rest, stay hydrated, OTC Motrin and/or Tylenol as needed  - E.R. precautions discussed     Asked to kindly follow up with their PCP's office in 2-3 days for a recheck, ER if not improving or feeling/getting worse.    Any realistic side effects of medications that may have been given today reviewed.     Patient left in stable condition     POCT results reviewed/discussed    Pertinent prior lab work and/or imaging studies in Epic have been reviewed by me today on day of this visit.

## 2021-10-31 LAB
BACTERIA UR CULT: NORMAL
SIGNIFICANT IND 70042: NORMAL
SITE SITE: NORMAL
SOURCE SOURCE: NORMAL

## 2021-12-20 DIAGNOSIS — E11.9 TYPE 2 DIABETES MELLITUS WITHOUT COMPLICATION, WITHOUT LONG-TERM CURRENT USE OF INSULIN (HCC): ICD-10-CM

## 2021-12-20 DIAGNOSIS — E78.00 HYPERCHOLESTEREMIA: ICD-10-CM

## 2021-12-20 RX ORDER — LOVASTATIN 10 MG/1
TABLET ORAL
Qty: 90 TABLET | Refills: 3 | Status: SHIPPED | OUTPATIENT
Start: 2021-12-20 | End: 2023-05-08 | Stop reason: SDUPTHER

## 2022-01-01 DIAGNOSIS — M81.0 AGE-RELATED OSTEOPOROSIS WITHOUT CURRENT PATHOLOGICAL FRACTURE: ICD-10-CM

## 2022-01-05 RX ORDER — ALENDRONATE SODIUM 70 MG/1
TABLET ORAL
Qty: 12 TABLET | Refills: 3 | Status: SHIPPED
Start: 2022-01-05 | End: 2022-10-13

## 2022-02-03 RX ORDER — LISINOPRIL 10 MG/1
10 TABLET ORAL DAILY
Qty: 90 TABLET | Refills: 3 | Status: SHIPPED | OUTPATIENT
Start: 2022-02-03 | End: 2023-01-05 | Stop reason: SDUPTHER

## 2022-04-07 ENCOUNTER — PATIENT MESSAGE (OUTPATIENT)
Dept: MEDICAL GROUP | Facility: MEDICAL CENTER | Age: 77
End: 2022-04-07
Payer: MEDICARE

## 2022-05-04 ENCOUNTER — OFFICE VISIT (OUTPATIENT)
Dept: URGENT CARE | Facility: CLINIC | Age: 77
End: 2022-05-04
Payer: MEDICARE

## 2022-05-04 VITALS
TEMPERATURE: 97.8 F | RESPIRATION RATE: 16 BRPM | BODY MASS INDEX: 23.05 KG/M2 | HEIGHT: 64 IN | OXYGEN SATURATION: 97 % | HEART RATE: 68 BPM | WEIGHT: 135 LBS | SYSTOLIC BLOOD PRESSURE: 148 MMHG | DIASTOLIC BLOOD PRESSURE: 64 MMHG

## 2022-05-04 DIAGNOSIS — R03.0 ELEVATED BLOOD PRESSURE READING: ICD-10-CM

## 2022-05-04 DIAGNOSIS — H91.93 DECREASED HEARING OF BOTH EARS: ICD-10-CM

## 2022-05-04 PROCEDURE — 99214 OFFICE O/P EST MOD 30 MIN: CPT | Performed by: FAMILY MEDICINE

## 2022-05-04 RX ORDER — LOVASTATIN 10 MG/1
10 TABLET ORAL DAILY
COMMUNITY
Start: 2021-12-20 | End: 2022-05-04

## 2022-05-04 RX ORDER — OXYBUTYNIN CHLORIDE 5 MG/1
1 TABLET, EXTENDED RELEASE ORAL DAILY
COMMUNITY
Start: 2022-01-24 | End: 2022-05-04

## 2022-05-04 RX ORDER — CHLORHEXIDINE GLUCONATE ORAL RINSE 1.2 MG/ML
SOLUTION DENTAL
COMMUNITY
Start: 2022-02-22 | End: 2022-05-04

## 2022-05-04 RX ORDER — ALENDRONATE SODIUM 70 MG/1
1 TABLET ORAL
COMMUNITY
Start: 2022-01-05 | End: 2022-05-05

## 2022-05-04 RX ORDER — IBUPROFEN 800 MG/1
TABLET ORAL
COMMUNITY
Start: 2022-02-22 | End: 2022-05-04

## 2022-05-04 RX ORDER — AMOXICILLIN 500 MG/1
CAPSULE ORAL
COMMUNITY
Start: 2022-02-22 | End: 2022-05-04

## 2022-05-04 ASSESSMENT — FIBROSIS 4 INDEX: FIB4 SCORE: 1.744196726926817227

## 2022-05-04 NOTE — PROGRESS NOTES
"  Subjective:      76 y.o. female presents to urgent care for sensation of ear fullness and decreased hearing for the last 7 days.  No inciting event or trauma at that time.  No other associated cold symptoms such as sore throat, cough, fevers, or body aches.  She does have a known history of decreased hearing.  She has tried hearing aids in the past but they did not help.    Blood pressure is elevated today in urgent care.  She does have a history of hypertension for which she takes lisinopril.  She denies any chest pain, palpitations, or shortness of breath.    She denies any other questions or concerns at this time.    Current problem list, medication, and past medical/surgical history were reviewed in Epic.    ROS  See HPI     Objective:      /64   Pulse 68   Temp 36.6 °C (97.8 °F) (Temporal)   Resp 16   Ht 1.626 m (5' 4\")   Wt 61.2 kg (135 lb)   SpO2 97%   BMI 23.17 kg/m²     Physical Exam  Constitutional:       General: She is not in acute distress.     Appearance: She is not diaphoretic.   HENT:      Right Ear: Tympanic membrane, ear canal and external ear normal.      Left Ear: Tympanic membrane, ear canal and external ear normal.   Cardiovascular:      Rate and Rhythm: Normal rate and regular rhythm.      Heart sounds: Normal heart sounds.   Pulmonary:      Effort: Pulmonary effort is normal. No respiratory distress.      Breath sounds: Normal breath sounds.   Neurological:      Mental Status: She is alert.   Psychiatric:         Mood and Affect: Affect normal.         Judgment: Judgment normal.       Assessment/Plan:     1. Decreased hearing of both ears  No signs of infection or cerumen impaction.  I placed referral to ENT.  - Referral to ENT    2. Elevated blood pressure reading  Asymptomatic. Referral back to PCP has been placed.   - Referral back to [facility] PCP      Instructed to return to Urgent Care or nearest Emergency Department if symptoms fail to improve, for any change in condition, " further concerns, or new concerning symptoms. Patient states understanding of the plan of care and discharge instructions.    Mindy Zazueta M.D.

## 2022-05-05 ENCOUNTER — OFFICE VISIT (OUTPATIENT)
Dept: URGENT CARE | Facility: CLINIC | Age: 77
End: 2022-05-05
Payer: MEDICARE

## 2022-05-05 ENCOUNTER — HOSPITAL ENCOUNTER (OUTPATIENT)
Facility: MEDICAL CENTER | Age: 77
End: 2022-05-05
Attending: PHYSICIAN ASSISTANT
Payer: MEDICARE

## 2022-05-05 VITALS
OXYGEN SATURATION: 95 % | BODY MASS INDEX: 23.05 KG/M2 | RESPIRATION RATE: 20 BRPM | HEIGHT: 64 IN | WEIGHT: 135 LBS | HEART RATE: 72 BPM | SYSTOLIC BLOOD PRESSURE: 128 MMHG | TEMPERATURE: 97.2 F | DIASTOLIC BLOOD PRESSURE: 42 MMHG

## 2022-05-05 DIAGNOSIS — N30.01 ACUTE CYSTITIS WITH HEMATURIA: ICD-10-CM

## 2022-05-05 LAB
APPEARANCE UR: CLEAR
BILIRUB UR STRIP-MCNC: NORMAL MG/DL
COLOR UR AUTO: NORMAL
GLUCOSE UR STRIP.AUTO-MCNC: NORMAL MG/DL
KETONES UR STRIP.AUTO-MCNC: NORMAL MG/DL
LEUKOCYTE ESTERASE UR QL STRIP.AUTO: NORMAL
NITRITE UR QL STRIP.AUTO: NORMAL
PH UR STRIP.AUTO: 6.5 [PH] (ref 5–8)
PROT UR QL STRIP: NORMAL MG/DL
RBC UR QL AUTO: NORMAL
SP GR UR STRIP.AUTO: 1
UROBILINOGEN UR STRIP-MCNC: 0.2 MG/DL

## 2022-05-05 PROCEDURE — 99213 OFFICE O/P EST LOW 20 MIN: CPT | Performed by: PHYSICIAN ASSISTANT

## 2022-05-05 PROCEDURE — 87086 URINE CULTURE/COLONY COUNT: CPT

## 2022-05-05 PROCEDURE — 81002 URINALYSIS NONAUTO W/O SCOPE: CPT | Performed by: PHYSICIAN ASSISTANT

## 2022-05-05 RX ORDER — CEFDINIR 300 MG/1
300 CAPSULE ORAL EVERY 12 HOURS
Qty: 10 CAPSULE | Refills: 0 | Status: SHIPPED | OUTPATIENT
Start: 2022-05-05 | End: 2022-05-10

## 2022-05-05 ASSESSMENT — ENCOUNTER SYMPTOMS
NAUSEA: 0
FLANK PAIN: 0
FEVER: 0
VOMITING: 0
BLURRED VISION: 0
CHILLS: 0
PALPITATIONS: 0
SHORTNESS OF BREATH: 0
DIZZINESS: 0
ABDOMINAL PAIN: 1

## 2022-05-05 ASSESSMENT — FIBROSIS 4 INDEX: FIB4 SCORE: 1.744196726926817227

## 2022-05-05 NOTE — PROGRESS NOTES
Subjective     Kacey Whelan is a 76 y.o. female who presents with Abdominal Pain (Burning sensation, lower abdominal pain  x this morning)    HPI:  Kacey Whelan is a 76 y.o. female who presents today for evaluation of a possible UTI.  Patient reports that she woke up this morning with some lower abdominal pain/pressure and burning with urination.  She is also had some mild urgency/frequency of urination.  No blood in the urine.  No fever/chills, nausea/vomiting, or flank pain.      Review of Systems   Constitutional: Negative for chills, fever and malaise/fatigue.   Eyes: Negative for blurred vision.   Respiratory: Negative for shortness of breath.    Cardiovascular: Negative for chest pain and palpitations.   Gastrointestinal: Positive for abdominal pain. Negative for nausea and vomiting.   Genitourinary: Positive for dysuria, frequency and urgency. Negative for flank pain and hematuria.   Neurological: Negative for dizziness.         PMH:  has a past medical history of Diabetes (HCC), Hearing loss, Heart murmur, Hyperlipidemia, Hypertension, and Osteoporosis.  MEDS:   Current Outpatient Medications:   •  metFORMIN ER (GLUCOPHAGE XR) 500 MG TABLET SR 24 HR, Take 1 tablet by mouth once daily, Disp: 90 Tablet, Rfl: 1  •  lisinopril (PRINIVIL) 10 MG Tab, Take 1 Tablet by mouth every day., Disp: 90 Tablet, Rfl: 3  •  alendronate (FOSAMAX) 70 MG Tab, TAKE 1 TABLET BY MOUTH ONCE WEEKLY, Disp: 12 Tablet, Rfl: 3  •  lovastatin (MEVACOR) 10 MG tablet, Take 1 tablet by mouth daily, Disp: 90 Tablet, Rfl: 3  •  Calcium Citrate-Vitamin D (CALCIUM + D PO), Take  by mouth., Disp: , Rfl:   •  aspirin 81 MG tablet, Take 1 Tab by mouth every day., Disp: 100 Tab, Rfl: 3  ALLERGIES:   Allergies   Allergen Reactions   • Latex Anaphylaxis     SURGHX:   Past Surgical History:   Procedure Laterality Date   • PRIMARY C SECTION      3 c section   • TONSILLECTOMY       SOCHX:  reports that she has never smoked. She has never  "used smokeless tobacco. She reports previous alcohol use of about 1.2 oz of alcohol per week. She reports that she does not use drugs.  FH: Family history was reviewed, no pertinent findings to report        Objective     /42 (BP Location: Left arm, Patient Position: Sitting, BP Cuff Size: Adult)   Pulse 72   Temp 36.2 °C (97.2 °F) (Temporal)   Resp 20   Ht 1.626 m (5' 4\")   Wt 61.2 kg (135 lb)   SpO2 95%   Breastfeeding No   BMI 23.17 kg/m²      Physical Exam  Constitutional:       Appearance: Normal appearance. She is well-developed.   HENT:      Head: Normocephalic and atraumatic.      Right Ear: External ear normal.      Left Ear: External ear normal.   Eyes:      Conjunctiva/sclera: Conjunctivae normal.      Pupils: Pupils are equal, round, and reactive to light.   Cardiovascular:      Rate and Rhythm: Normal rate and regular rhythm.      Heart sounds: No murmur heard.  Pulmonary:      Effort: Pulmonary effort is normal.      Breath sounds: Normal breath sounds.   Abdominal:      Palpations: Abdomen is soft.      Tenderness: There is no abdominal tenderness. There is no right CVA tenderness or left CVA tenderness.   Skin:     General: Skin is warm and dry.      Capillary Refill: Capillary refill takes less than 2 seconds.   Neurological:      Mental Status: She is alert and oriented to person, place, and time.   Psychiatric:         Behavior: Behavior normal.         Judgment: Judgment normal.       POCT Urinalysis  Lab Results   Component Value Date/Time    POCCOLOR LIGHT YELLOW 05/05/2022 09:20 AM    POCAPPEAR CLEAR 05/05/2022 09:20 AM    POCLEUKEST MOD 05/05/2022 09:20 AM    POCNITRITE NEG 05/05/2022 09:20 AM    POCUROBILIGE 0.2 05/05/2022 09:20 AM    POCPROTEIN NEG 05/05/2022 09:20 AM    POCURPH 6.5 05/05/2022 09:20 AM    POCBLOOD MOD 05/05/2022 09:20 AM    POCSPGRV 1.005 05/05/2022 09:20 AM    POCKETONES NEG 05/05/2022 09:20 AM    POCBILIRUBIN NEG 05/05/2022 09:20 AM    POCGLUCUA NEG " 05/05/2022 09:20 AM        Assessment & Plan     1. Acute cystitis with hematuria  - POCT Urinalysis  - Urine Culture; Future  - cefdinir (OMNICEF) 300 MG Cap; Take 1 Capsule by mouth every 12 hours for 5 days.  Dispense: 10 Capsule; Refill: 0    Symptoms and urinalysis findings are consistent with urinary tract infection.  We will start her on a 5-day course of cefdinir.  She was encouraged to drink plenty of fluids as well.          Differential Diagnosis, natural history, and supportive care discussed. Return to the Urgent Care or follow up with your PCP if symptoms fail to resolve, or for any new or worsening symptoms. Emergency room precautions discussed. Patient and/or family appears understanding of information.

## 2022-05-08 LAB
BACTERIA UR CULT: NORMAL
SIGNIFICANT IND 70042: NORMAL
SITE SITE: NORMAL
SOURCE SOURCE: NORMAL

## 2022-06-02 ENCOUNTER — PATIENT MESSAGE (OUTPATIENT)
Dept: MEDICAL GROUP | Facility: MEDICAL CENTER | Age: 77
End: 2022-06-02

## 2022-06-02 DIAGNOSIS — Z13.5 SCREENING FOR DIABETIC RETINOPATHY: ICD-10-CM

## 2022-06-02 DIAGNOSIS — E11.9 TYPE 2 DIABETES MELLITUS WITHOUT COMPLICATION, WITHOUT LONG-TERM CURRENT USE OF INSULIN (HCC): ICD-10-CM

## 2022-06-15 NOTE — TELEPHONE ENCOUNTER
Please find out if patient is already established with ophthalmology. If not, please pend referral for ophthalmology to be signed.     CHAVA Garcias.

## 2022-06-16 ENCOUNTER — HOSPITAL ENCOUNTER (OUTPATIENT)
Dept: RADIOLOGY | Facility: MEDICAL CENTER | Age: 77
End: 2022-06-16
Attending: NURSE PRACTITIONER
Payer: MEDICARE

## 2022-06-16 DIAGNOSIS — M81.0 AGE-RELATED OSTEOPOROSIS WITHOUT CURRENT PATHOLOGICAL FRACTURE: ICD-10-CM

## 2022-06-16 DIAGNOSIS — Z12.31 SCREENING MAMMOGRAM, ENCOUNTER FOR: ICD-10-CM

## 2022-06-16 PROCEDURE — 77063 BREAST TOMOSYNTHESIS BI: CPT

## 2022-06-16 PROCEDURE — 77080 DXA BONE DENSITY AXIAL: CPT

## 2022-07-21 ENCOUNTER — TELEPHONE (OUTPATIENT)
Dept: MEDICAL GROUP | Facility: MEDICAL CENTER | Age: 77
End: 2022-07-21
Payer: MEDICARE

## 2022-07-21 NOTE — TELEPHONE ENCOUNTER
----- Message from NED Garcias sent at 7/21/2022 11:14 AM PDT -----  Please schedule patient for follow up appointment to discuss results. This is not urgent. Her appointment for 7/21 was cancelled (provider sick). She needs assistance scheduling transportation as well for visit.   NED Garcias

## 2022-07-21 NOTE — TELEPHONE ENCOUNTER
Per  Ayanna   Spoke with patent agreed to [facility] appt for 08/24/2022 had no further questions SUSAN

## 2022-10-09 SDOH — ECONOMIC STABILITY: FOOD INSECURITY: WITHIN THE PAST 12 MONTHS, THE FOOD YOU BOUGHT JUST DIDN'T LAST AND YOU DIDN'T HAVE MONEY TO GET MORE.: NEVER TRUE

## 2022-10-09 SDOH — ECONOMIC STABILITY: TRANSPORTATION INSECURITY
IN THE PAST 12 MONTHS, HAS LACK OF RELIABLE TRANSPORTATION KEPT YOU FROM MEDICAL APPOINTMENTS, MEETINGS, WORK OR FROM GETTING THINGS NEEDED FOR DAILY LIVING?: NO

## 2022-10-09 SDOH — ECONOMIC STABILITY: HOUSING INSECURITY

## 2022-10-09 SDOH — ECONOMIC STABILITY: INCOME INSECURITY: IN THE LAST 12 MONTHS, WAS THERE A TIME WHEN YOU WERE NOT ABLE TO PAY THE MORTGAGE OR RENT ON TIME?: NO

## 2022-10-09 SDOH — ECONOMIC STABILITY: INCOME INSECURITY: HOW HARD IS IT FOR YOU TO PAY FOR THE VERY BASICS LIKE FOOD, HOUSING, MEDICAL CARE, AND HEATING?: PATIENT DECLINED

## 2022-10-09 SDOH — ECONOMIC STABILITY: FOOD INSECURITY: WITHIN THE PAST 12 MONTHS, YOU WORRIED THAT YOUR FOOD WOULD RUN OUT BEFORE YOU GOT MONEY TO BUY MORE.: NEVER TRUE

## 2022-10-09 SDOH — ECONOMIC STABILITY: TRANSPORTATION INSECURITY
IN THE PAST 12 MONTHS, HAS LACK OF TRANSPORTATION KEPT YOU FROM MEETINGS, WORK, OR FROM GETTING THINGS NEEDED FOR DAILY LIVING?: NO

## 2022-10-09 SDOH — HEALTH STABILITY: PHYSICAL HEALTH: ON AVERAGE, HOW MANY DAYS PER WEEK DO YOU ENGAGE IN MODERATE TO STRENUOUS EXERCISE (LIKE A BRISK WALK)?: 7 DAYS

## 2022-10-09 SDOH — HEALTH STABILITY: PHYSICAL HEALTH: ON AVERAGE, HOW MANY MINUTES DO YOU ENGAGE IN EXERCISE AT THIS LEVEL?: 30 MIN

## 2022-10-09 ASSESSMENT — SOCIAL DETERMINANTS OF HEALTH (SDOH)
HOW OFTEN DO YOU ATTENT MEETINGS OF THE CLUB OR ORGANIZATION YOU BELONG TO?: NEVER
DO YOU BELONG TO ANY CLUBS OR ORGANIZATIONS SUCH AS CHURCH GROUPS UNIONS, FRATERNAL OR ATHLETIC GROUPS, OR SCHOOL GROUPS?: NO
IN A TYPICAL WEEK, HOW MANY TIMES DO YOU TALK ON THE PHONE WITH FAMILY, FRIENDS, OR NEIGHBORS?: THREE TIMES A WEEK
HOW OFTEN DO YOU GET TOGETHER WITH FRIENDS OR RELATIVES?: PATIENT DECLINED
DO YOU BELONG TO ANY CLUBS OR ORGANIZATIONS SUCH AS CHURCH GROUPS UNIONS, FRATERNAL OR ATHLETIC GROUPS, OR SCHOOL GROUPS?: NO
HOW OFTEN DO YOU GET TOGETHER WITH FRIENDS OR RELATIVES?: PATIENT DECLINED
HOW HARD IS IT FOR YOU TO PAY FOR THE VERY BASICS LIKE FOOD, HOUSING, MEDICAL CARE, AND HEATING?: PATIENT DECLINED
HOW MANY DRINKS CONTAINING ALCOHOL DO YOU HAVE ON A TYPICAL DAY WHEN YOU ARE DRINKING: PATIENT DOES NOT DRINK
IN A TYPICAL WEEK, HOW MANY TIMES DO YOU TALK ON THE PHONE WITH FAMILY, FRIENDS, OR NEIGHBORS?: THREE TIMES A WEEK
HOW OFTEN DO YOU ATTEND CHURCH OR RELIGIOUS SERVICES?: PATIENT DECLINED
HOW OFTEN DO YOU HAVE SIX OR MORE DRINKS ON ONE OCCASION: NEVER
HOW OFTEN DO YOU ATTEND CHURCH OR RELIGIOUS SERVICES?: PATIENT DECLINED
WITHIN THE PAST 12 MONTHS, YOU WORRIED THAT YOUR FOOD WOULD RUN OUT BEFORE YOU GOT THE MONEY TO BUY MORE: NEVER TRUE
HOW OFTEN DO YOU HAVE A DRINK CONTAINING ALCOHOL: NEVER
HOW OFTEN DO YOU ATTENT MEETINGS OF THE CLUB OR ORGANIZATION YOU BELONG TO?: NEVER

## 2022-10-09 ASSESSMENT — LIFESTYLE VARIABLES
HOW OFTEN DO YOU HAVE SIX OR MORE DRINKS ON ONE OCCASION: NEVER
AUDIT-C TOTAL SCORE: 0
HOW OFTEN DO YOU HAVE A DRINK CONTAINING ALCOHOL: NEVER
HOW MANY STANDARD DRINKS CONTAINING ALCOHOL DO YOU HAVE ON A TYPICAL DAY: PATIENT DOES NOT DRINK
SKIP TO QUESTIONS 9-10: 1

## 2022-10-13 ENCOUNTER — PATIENT MESSAGE (OUTPATIENT)
Dept: MEDICAL GROUP | Facility: MEDICAL CENTER | Age: 77
End: 2022-10-13

## 2022-10-13 ENCOUNTER — OFFICE VISIT (OUTPATIENT)
Dept: MEDICAL GROUP | Facility: MEDICAL CENTER | Age: 77
End: 2022-10-13
Payer: MEDICARE

## 2022-10-13 VITALS
BODY MASS INDEX: 23.39 KG/M2 | OXYGEN SATURATION: 98 % | HEIGHT: 64 IN | DIASTOLIC BLOOD PRESSURE: 80 MMHG | HEART RATE: 60 BPM | WEIGHT: 137 LBS | TEMPERATURE: 98.3 F | SYSTOLIC BLOOD PRESSURE: 120 MMHG

## 2022-10-13 DIAGNOSIS — M81.0 AGE-RELATED OSTEOPOROSIS WITHOUT CURRENT PATHOLOGICAL FRACTURE: ICD-10-CM

## 2022-10-13 DIAGNOSIS — N39.46 MIXED STRESS AND URGE URINARY INCONTINENCE: ICD-10-CM

## 2022-10-13 DIAGNOSIS — I10 PRIMARY HYPERTENSION: ICD-10-CM

## 2022-10-13 DIAGNOSIS — E11.65 TYPE 2 DIABETES MELLITUS WITH HYPERGLYCEMIA, WITHOUT LONG-TERM CURRENT USE OF INSULIN (HCC): ICD-10-CM

## 2022-10-13 DIAGNOSIS — I35.0 AORTIC VALVE STENOSIS, ETIOLOGY OF CARDIAC VALVE DISEASE UNSPECIFIED: ICD-10-CM

## 2022-10-13 DIAGNOSIS — H90.6 MIXED CONDUCTIVE AND SENSORINEURAL HEARING LOSS OF BOTH EARS: ICD-10-CM

## 2022-10-13 DIAGNOSIS — Z91.89 AT HIGH RISK FOR FRACTURE: ICD-10-CM

## 2022-10-13 DIAGNOSIS — E78.00 HYPERCHOLESTEREMIA: ICD-10-CM

## 2022-10-13 DIAGNOSIS — E55.9 VITAMIN D DEFICIENCY: ICD-10-CM

## 2022-10-13 DIAGNOSIS — Z00.00 MEDICARE ANNUAL WELLNESS VISIT, SUBSEQUENT: ICD-10-CM

## 2022-10-13 LAB
HBA1C MFR BLD: 6.3 % (ref 0–5.6)
INT CON NEG: ABNORMAL
INT CON POS: ABNORMAL

## 2022-10-13 PROCEDURE — 83036 HEMOGLOBIN GLYCOSYLATED A1C: CPT | Performed by: NURSE PRACTITIONER

## 2022-10-13 PROCEDURE — G0439 PPPS, SUBSEQ VISIT: HCPCS | Performed by: NURSE PRACTITIONER

## 2022-10-13 RX ORDER — DENOSUMAB 60 MG/ML
60 INJECTION SUBCUTANEOUS
Qty: 1 ML | Refills: 1 | Status: SHIPPED | OUTPATIENT
Start: 2022-10-13 | End: 2023-10-18

## 2022-10-13 ASSESSMENT — ACTIVITIES OF DAILY LIVING (ADL): BATHING_REQUIRES_ASSISTANCE: 0

## 2022-10-13 ASSESSMENT — PATIENT HEALTH QUESTIONNAIRE - PHQ9: CLINICAL INTERPRETATION OF PHQ2 SCORE: 0

## 2022-10-13 ASSESSMENT — FIBROSIS 4 INDEX: FIB4 SCORE: 1.767766952966368811

## 2022-10-13 ASSESSMENT — ENCOUNTER SYMPTOMS: GENERAL WELL-BEING: GOOD

## 2022-10-13 NOTE — PROGRESS NOTES
No chief complaint on file.        HPI:  Kacey Whelan is a 77 y.o. here for Medicare Annual Wellness Visit     Patient Active Problem List    Diagnosis Date Noted    Mixed stress and urge urinary incontinence 10/02/2019    Age-related osteoporosis without current pathological fracture 06/26/2017    Vitamin D deficiency 03/30/2017    Type 2 diabetes mellitus with hyperglycemia, without long-term current use of insulin (HCC) 10/14/2016    Hypercholesteremia 10/14/2016    HTN (hypertension) 03/06/2015    Aortic stenosis 03/06/2015    Hearing loss 03/06/2015       Current Outpatient Medications   Medication Sig Dispense Refill    denosumab (PROLIA) 60 MG/ML Solution Prefilled Syringe injection Inject 1 mL under the skin every 6 months. 1 mL 1    metFORMIN ER (GLUCOPHAGE XR) 500 MG TABLET SR 24 HR Take 1 tablet by mouth once daily 90 Tablet 1    lisinopril (PRINIVIL) 10 MG Tab Take 1 Tablet by mouth every day. 90 Tablet 3    lovastatin (MEVACOR) 10 MG tablet Take 1 tablet by mouth daily 90 Tablet 3    Calcium Citrate-Vitamin D (CALCIUM + D PO) Take  by mouth.      aspirin 81 MG tablet Take 1 Tab by mouth every day. 100 Tab 3     No current facility-administered medications for this visit.          Current supplements as per medication list.     Allergies: Latex    Current social contact/activities: Walking, talking with friends and family    She  reports that she has never smoked. She has never used smokeless tobacco. She reports that she does not currently use alcohol after a past usage of about 1.2 oz per week. She reports that she does not use drugs.  Counseling given: Not Answered        DPA/Advanced Directive:  Patient does not have an Advanced Directive.  A packet and workshop information was given on Advanced Directives.    ROS:    Gait: Uses no assistive device  Ostomy: No  Other tubes: No  Amputations: No  Chronic oxygen use: No  Last eye exam: 10/2022  Wears hearing aids: Yes   : Denies any urinary  leakage during the last 6 months    Screening:    Depression Screening  Little interest or pleasure in doing things?  0 - not at all  Feeling down, depressed , or hopeless? 0 - not at all  Patient Health Questionnaire Score: 0     If depressive symptoms identified deferred to follow up visit unless specifically addressed in assessment and plan.    Interpretation of PHQ-9 Total Score   Score Severity   1-4 No Depression   5-9 Mild Depression   10-14 Moderate Depression   15-19 Moderately Severe Depression   20-27 Severe Depression    Screening for Cognitive Impairment  Three Minute Recall (daughter, heaven, mountain) 3/3    Tonny clock face with all 12 numbers and set the hands to show 10 past 11.  Yes    Cognitive concerns identified deferred for follow up unless specifically addressed in assessment and plan.    Fall Risk Assessment  Has the patient had two or more falls in the last year or any fall with injury in the last year?  No    Safety Assessment  Throw rugs on floor.  No  Handrails on all stairs.  Yes  Good lighting in all hallways.  Yes  Difficulty hearing.  Yes  Patient counseled about all safety risks that were identified.    Functional Assessment ADLs  Are there any barriers preventing you from cooking for yourself or meeting nutritional needs?  No.    Are there any barriers preventing you from driving safely or obtaining transportation?  No.    Are there any barriers preventing you from using a telephone or calling for help?  No.    Are there any barriers preventing you from shopping?  No.    Are there any barriers preventing you from taking care of your own finances?  No.    Are there any barriers preventing you from managing your medications?  No.    Are there any barriers preventing you from showering, bathing or dressing yourself?  No.    Are you currently engaging in any exercise or physical activity?  Yes.  Walks everyday   What is your perception of your health?  Good.    Advance Care Planning  Do  you have an Advance Directive, Living Will, Durable Power of , or POLST? No.    Health Maintenance Summary            Overdue - IMM DTaP/Tdap/Td Vaccine (1 - Tdap) Overdue - never done      No completion history exists for this topic.              Overdue - IMM ZOSTER VACCINES (1 of 2) Overdue - never done      No completion history exists for this topic.              Overdue - COVID-19 Vaccine (3 - Booster for Moderna series) Overdue since 6/16/2021 04/21/2021  Imm Admin: MODERNA SARS-COV-2 VACCINE (12+)    02/26/2021  Imm Admin: MODERNA SARS-COV-2 VACCINE (12+)              RETINAL SCREENING (Every 6 Months) Due soon on 2/1/2023 08/01/2022  REFERRAL FOR RETINAL SCREENING EXAM    10/24/2018  REFERRAL FOR RETINAL SCREENING EXAM    11/30/2016  REFERRAL FOR RETINAL SCREENING EXAM              A1C SCREENING (Every 6 Months) Next due on 4/13/2023      10/13/2022  POCT Hemoglobin A1C    10/05/2021  HEMOGLOBIN A1C    10/20/2020  HEMOGLOBIN A1C    09/24/2019  HEMOGLOBIN A1C    04/04/2019  POCT Hemoglobin A1C    Only the first 5 history entries have been loaded, but more history exists.              DIABETES MONOFILAMENT / LE EXAM (Yearly) Next due on 10/13/2023      10/13/2022  Diabetic Monofilament Lower Extremity Exam    10/13/2022  [employer]: WORKFLOW - DIABETES - DIABETIC FOOT EXAM PERFORMED    10/07/2020  Smart[employer]: WORKFLOW - DIABETES - DIABETIC FOOT EXAM PERFORMED    05/22/2018  Diabetic Monofilament Lower Extremity Exam    12/27/2016  Diabetic Monofilament Lower Extremity Exam    Only the first 5 history entries have been loaded, but more history exists.              Annual Wellness Visit (Every 366 Days) Next due on 10/14/2023      10/13/2022  Visit Dx: Medicare annual wellness visit, subsequent    10/13/2021  Visit Dx: Medicare annual wellness visit, subsequent    10/07/2020  Subsequent Annual Wellness Visit - Includes PPPS ()    10/07/2020  Visit Dx: Medicare annual wellness visit,  subsequent    10/02/2019  Subsequent Annual Wellness Visit - Includes PPPS ()    Only the first 5 history entries have been loaded, but more history exists.              FASTING LIPID PROFILE (Yearly) Next due on 11/4/2023 11/04/2022  Lipid Profile    10/05/2021  Lipid Profile    10/20/2020  Lipid Profile    09/24/2019  Lipid Profile    10/23/2018  LIPID PROFILE    Only the first 5 history entries have been loaded, but more history exists.              URINE ACR / MICROALBUMIN (Yearly) Next due on 11/4/2023 11/04/2022  MICROALBUMIN CREAT RATIO URINE    10/05/2021  MICROALBUMIN CREAT RATIO URINE    10/20/2020  MICROALBUMIN CREAT RATIO URINE    09/24/2019  MICROALBUMIN CREAT RATIO URINE    10/23/2018  MICROALBUMIN CREAT RATIO URINE    Only the first 5 history entries have been loaded, but more history exists.              SERUM CREATININE (Yearly) Next due on 11/4/2023 11/04/2022  Comp Metabolic Panel    10/05/2021  Comp Metabolic Panel    10/20/2020  Comp Metabolic Panel    09/24/2019  Comp Metabolic Panel    10/23/2018  COMP METABOLIC PANEL    Only the first 5 history entries have been loaded, but more history exists.              BONE DENSITY (Every 2 Years) Next due on 6/16/2024 06/16/2022  DS-BONE DENSITY STUDY (DEXA)    10/08/2019  DS-BONE DENSITY STUDY (DEXA)    02/14/2017  DS-BONE DENSITY STUDY (DEXA)              IMM PNEUMOCOCCAL VACCINE: 65+ Years (Series Information) Completed      03/30/2017  Imm Admin: Pneumococcal Conjugate Vaccine (Prevnar/PCV-13)    09/05/2016  Imm Admin: Pneumococcal polysaccharide vaccine (PPSV-23)              HEPATITIS C SCREENING  Completed      10/20/2020  HEP C VIRUS ANTIBODY              IMM INFLUENZA (Series Information) Completed      08/24/2022  Imm Admin: Influenza Vaccine Adult HD    09/26/2020  Imm Admin: Influenza Vaccine Adult HD    09/03/2019  Imm Admin: Influenza Vaccine Adult HD    10/01/2018  Imm Admin: Influenza Vaccine Adult HD     09/11/2017  Imm Admin: Influenza Vaccine Adult HD    Only the first 5 history entries have been loaded, but more history exists.              IMM HEP B VACCINE (Series Information) Aged Out      No completion history exists for this topic.              IMM MENINGOCOCCAL ACWY VACCINE (Series Information) Aged Out      No completion history exists for this topic.              Discontinued - MAMMOGRAM  Discontinued        Frequency changed to Never automatically (Topic No Longer Applies)    06/16/2022  MA-SCREENING MAMMO BILAT W/TOMOSYNTHESIS W/CAD    05/03/2019  MA-SCREENING MAMMO BILAT W/TOMOSYNTHESIS W/CAD    02/14/2017  MA-MAMMO SCREENING BILAT W/ABIMBOLA W/CAD              Discontinued - COLORECTAL CANCER SCREENING  Discontinued      No completion history exists for this topic.                    Patient Care Team:  NED Garcias as PCP - General (Family Medicine)  Hi Medina M.D. (Inactive) as Consulting Physician (Ophthalmology)  Zaire Brown M.D. (Otolaryngology)        Social History     Tobacco Use    Smoking status: Never    Smokeless tobacco: Never   Vaping Use    Vaping Use: Never used   Substance Use Topics    Alcohol use: Not Currently     Alcohol/week: 1.2 oz     Types: 2 Glasses of wine per week    Drug use: Never     Family History   Problem Relation Age of Onset    Cancer Mother         breast cancer    Heart Disease Father 46        MI    Heart Attack Father     Heart Disease Sister         2 heart surgery    Diabetes Brother     Cancer Brother         brain tumor    Heart Disease Brother         HEART STENT    Diabetes Brother     Diabetes Brother     Cancer Brother         anal     She  has a past medical history of Diabetes (HCC), Hearing loss, Heart murmur, Hyperlipidemia, Hypertension, and Osteoporosis.   Past Surgical History:   Procedure Laterality Date    PRIMARY C SECTION      3 c section    TONSILLECTOMY         Exam:   /80 (BP Location: Right arm, Patient Position:  "Sitting, BP Cuff Size: Adult)   Pulse 60   Temp 36.8 °C (98.3 °F) (Temporal)   Ht 1.626 m (5' 4\")   Wt 62.1 kg (137 lb)   SpO2 98%  Body mass index is 23.52 kg/m².    Hearing poor.    Dentition poor  Constitutional: Alert, no distress.  Skin: Warm, dry, good turgor, no rashes in visible areas.  Eye: Equal, round and reactive, conjunctiva clear, lids normal.  ENMT: Lips without lesions, good dentition, oropharynx clear.  Neck: Trachea midline, no masses, no thyromegaly. No cervical or supraclavicular lymphadenopathy  Respiratory: Unlabored respiratory effort, lungs clear to auscultation, no wheezes, no ronchi.  Cardiovascular: Normal S1, S2, + murmur, no edema.  Abdomen: Soft, non-tender, no masses, no hepatosplenomegaly.  Psych: Alert and oriented x3, normal affect and mood.      Monofilament testing with a 10 gram force: sensation intact: intact bilaterally  Visual Inspection: Feet without maceration, ulcers, fissures.  Pedal pulses: intact bilaterally    Assessment and Plan. The following treatment and monitoring plan is recommended:      1. Medicare annual wellness visit, subsequent    2. Type 2 diabetes mellitus with hyperglycemia, without long-term current use of insulin (HCC)  Stable  Continue current medication  Due for labs  A1C 6.3   - POCT Hemoglobin A1C  - CBC WITH DIFFERENTIAL; Future  - Comp Metabolic Panel; Future  - MICROALBUMIN CREAT RATIO URINE; Future  - Diabetic Monofilament Lower Extremity Exam    3. Vitamin D deficiency  Stable  Due for labs  Continue vit d supplement  - VITAMIN D,25 HYDROXY (DEFICIENCY); Future    4. Hypercholesteremia  Stable on lovastatin daily, due for labs  - Lipid Profile; Future    5. Primary hypertension  Stable on lisinopril daily , due for labs  - MICROALBUMIN CREAT RATIO URINE; Future  - TSH WITH REFLEX TO FT4; Future    6. Age-related osteoporosis without current pathological fracture  STable  No improvement with alendronate x 5 years, stopped 6 months " ago  Start Prolia  Repeat DEXA 2 years  Continue daily Vitamin D and Calcium supplement  - VITAMIN D,25 HYDROXY (DEFICIENCY); Future  - denosumab (PROLIA) injection 60 mg    7. At high risk for fracture  - denosumab (PROLIA) injection 60 mg    8. Mixed conductive and sensorineural hearing loss of both ears  Stable with hearing aids    9. Aortic valve stenosis, etiology of cardiac valve disease unspecified  Stable  Due for repeat Echo for monitoring, remains asymptomatic  - EC-ECHOCARDIOGRAM COMPLETE W/O CONT; Future    10. Mixed stress and urge urinary incontinence  Stable  Failed oxybutynin due to dry mouth side effect    Services suggested: No services needed at this time  Health Care Screening: Age-appropriate preventive services recommended by USPTF and ACIP covered by Medicare were discussed today. Services ordered if indicated and agreed upon by the patient.  Referrals offered: Community-based lifestyle interventions to reduce health risks and promote self-management and wellness, fall prevention, nutrition, physical activity, tobacco-use cessation, weight loss, and mental health services as per orders if indicated.    Discussion today about general wellness and lifestyle habits:    Prevent falls and reduce trip hazards; Cautioned about securing or removing rugs.  Have a working fire alarm and carbon monoxide detector;   Engage in regular physical activity and social activities     Follow-up: Return in about 1 year (around 10/13/2023).

## 2022-10-14 ENCOUNTER — TELEPHONE (OUTPATIENT)
Dept: MEDICAL GROUP | Facility: MEDICAL CENTER | Age: 77
End: 2022-10-14

## 2022-10-14 NOTE — TELEPHONE ENCOUNTER
Per  Ayanna faxed over Infusion form to [phone] for patient to start her Osteoporosis medication Prolia 60mg conformation sent to be scanned [facility] cma

## 2022-11-02 ENCOUNTER — TELEPHONE (OUTPATIENT)
Dept: ONCOLOGY | Facility: MEDICAL CENTER | Age: 77
End: 2022-11-02
Payer: MEDICARE

## 2022-11-02 NOTE — TELEPHONE ENCOUNTER
11/02/22 patient called to schedule Prolia, Unable to schedule as we don't have orders. Patient states she thinks the order went to her [facility], as a courtesy I sent an in-basket to her provider requesting order be faxed to [facility] at [phone]. JSD

## 2022-11-03 ENCOUNTER — DOCUMENTATION (OUTPATIENT)
Dept: MEDICAL GROUP | Facility: MEDICAL CENTER | Age: 77
End: 2022-11-03
Payer: MEDICARE

## 2022-11-03 NOTE — PROGRESS NOTES
Per Mrs Dimple faxed order form ,demo,and ins info to [facility] infusion center at [phone] conformations was scanned

## 2022-11-04 ENCOUNTER — HOSPITAL ENCOUNTER (OUTPATIENT)
Dept: LAB | Facility: MEDICAL CENTER | Age: 77
End: 2022-11-04
Attending: NURSE PRACTITIONER
Payer: MEDICARE

## 2022-11-04 DIAGNOSIS — M81.0 AGE-RELATED OSTEOPOROSIS WITHOUT CURRENT PATHOLOGICAL FRACTURE: ICD-10-CM

## 2022-11-04 DIAGNOSIS — E55.9 VITAMIN D DEFICIENCY: ICD-10-CM

## 2022-11-04 DIAGNOSIS — E78.00 HYPERCHOLESTEREMIA: ICD-10-CM

## 2022-11-04 DIAGNOSIS — E11.65 TYPE 2 DIABETES MELLITUS WITH HYPERGLYCEMIA, WITHOUT LONG-TERM CURRENT USE OF INSULIN (HCC): ICD-10-CM

## 2022-11-04 DIAGNOSIS — I10 PRIMARY HYPERTENSION: ICD-10-CM

## 2022-11-04 LAB
25(OH)D3 SERPL-MCNC: 48 NG/ML (ref 30–100)
ALBUMIN SERPL BCP-MCNC: 4.2 G/DL (ref 3.2–4.9)
ALBUMIN/GLOB SERPL: 1.3 G/DL
ALP SERPL-CCNC: 42 U/L (ref 30–99)
ALT SERPL-CCNC: 9 U/L (ref 2–50)
ANION GAP SERPL CALC-SCNC: 11 MMOL/L (ref 7–16)
AST SERPL-CCNC: 15 U/L (ref 12–45)
BASOPHILS # BLD AUTO: 1 % (ref 0–1.8)
BASOPHILS # BLD: 0.06 K/UL (ref 0–0.12)
BILIRUB SERPL-MCNC: 0.4 MG/DL (ref 0.1–1.5)
BUN SERPL-MCNC: 13 MG/DL (ref 8–22)
CALCIUM SERPL-MCNC: 9.1 MG/DL (ref 8.5–10.5)
CHLORIDE SERPL-SCNC: 103 MMOL/L (ref 96–112)
CHOLEST SERPL-MCNC: 226 MG/DL (ref 100–199)
CO2 SERPL-SCNC: 22 MMOL/L (ref 20–33)
CREAT SERPL-MCNC: 0.75 MG/DL (ref 0.5–1.4)
CREAT UR-MCNC: 50.23 MG/DL
EOSINOPHIL # BLD AUTO: 0.12 K/UL (ref 0–0.51)
EOSINOPHIL NFR BLD: 2 % (ref 0–6.9)
ERYTHROCYTE [DISTWIDTH] IN BLOOD BY AUTOMATED COUNT: 40.9 FL (ref 35.9–50)
FASTING STATUS PATIENT QL REPORTED: NORMAL
GFR SERPLBLD CREATININE-BSD FMLA CKD-EPI: 83 ML/MIN/1.73 M 2
GLOBULIN SER CALC-MCNC: 3.3 G/DL (ref 1.9–3.5)
GLUCOSE SERPL-MCNC: 123 MG/DL (ref 65–99)
HCT VFR BLD AUTO: 41.4 % (ref 37–47)
HDLC SERPL-MCNC: 63 MG/DL
HGB BLD-MCNC: 14.4 G/DL (ref 12–16)
IMM GRANULOCYTES # BLD AUTO: 0.02 K/UL (ref 0–0.11)
IMM GRANULOCYTES NFR BLD AUTO: 0.3 % (ref 0–0.9)
LDLC SERPL CALC-MCNC: 144 MG/DL
LYMPHOCYTES # BLD AUTO: 1.78 K/UL (ref 1–4.8)
LYMPHOCYTES NFR BLD: 29.5 % (ref 22–41)
MCH RBC QN AUTO: 30.6 PG (ref 27–33)
MCHC RBC AUTO-ENTMCNC: 34.8 G/DL (ref 33.6–35)
MCV RBC AUTO: 87.9 FL (ref 81.4–97.8)
MICROALBUMIN UR-MCNC: 2.1 MG/DL
MICROALBUMIN/CREAT UR: 42 MG/G (ref 0–30)
MONOCYTES # BLD AUTO: 0.44 K/UL (ref 0–0.85)
MONOCYTES NFR BLD AUTO: 7.3 % (ref 0–13.4)
NEUTROPHILS # BLD AUTO: 3.62 K/UL (ref 2–7.15)
NEUTROPHILS NFR BLD: 59.9 % (ref 44–72)
NRBC # BLD AUTO: 0 K/UL
NRBC BLD-RTO: 0 /100 WBC
PLATELET # BLD AUTO: 263 K/UL (ref 164–446)
PMV BLD AUTO: 8.1 FL (ref 9–12.9)
POTASSIUM SERPL-SCNC: 4.4 MMOL/L (ref 3.6–5.5)
PROT SERPL-MCNC: 7.5 G/DL (ref 6–8.2)
RBC # BLD AUTO: 4.71 M/UL (ref 4.2–5.4)
SODIUM SERPL-SCNC: 136 MMOL/L (ref 135–145)
T4 FREE SERPL-MCNC: 1.2 NG/DL (ref 0.93–1.7)
TRIGL SERPL-MCNC: 97 MG/DL (ref 0–149)
WBC # BLD AUTO: 6 K/UL (ref 4.8–10.8)

## 2022-11-04 PROCEDURE — 36415 COLL VENOUS BLD VENIPUNCTURE: CPT

## 2022-11-04 PROCEDURE — 82043 UR ALBUMIN QUANTITATIVE: CPT

## 2022-11-04 PROCEDURE — 82306 VITAMIN D 25 HYDROXY: CPT

## 2022-11-04 PROCEDURE — 84439 ASSAY OF FREE THYROXINE: CPT

## 2022-11-04 PROCEDURE — 84443 ASSAY THYROID STIM HORMONE: CPT

## 2022-11-04 PROCEDURE — 82570 ASSAY OF URINE CREATININE: CPT

## 2022-11-04 PROCEDURE — 80053 COMPREHEN METABOLIC PANEL: CPT

## 2022-11-04 PROCEDURE — 85025 COMPLETE CBC W/AUTO DIFF WBC: CPT

## 2022-11-04 PROCEDURE — 80061 LIPID PANEL: CPT

## 2022-11-05 LAB — TSH SERPL DL<=0.005 MIU/L-ACNC: 1.37 UIU/ML (ref 0.38–5.33)

## 2022-11-11 ENCOUNTER — PATIENT MESSAGE (OUTPATIENT)
Dept: HEALTH INFORMATION MANAGEMENT | Facility: OTHER | Age: 77
End: 2022-11-11

## 2022-11-15 ENCOUNTER — APPOINTMENT (OUTPATIENT)
Dept: CARDIOLOGY | Facility: MEDICAL CENTER | Age: 77
End: 2022-11-15
Attending: NURSE PRACTITIONER
Payer: MEDICARE

## 2023-01-04 DIAGNOSIS — E11.9 TYPE 2 DIABETES MELLITUS WITHOUT COMPLICATION, WITHOUT LONG-TERM CURRENT USE OF INSULIN (HCC): ICD-10-CM

## 2023-01-04 RX ORDER — METFORMIN HYDROCHLORIDE 500 MG/1
TABLET, EXTENDED RELEASE ORAL
Qty: 90 TABLET | Refills: 3 | Status: SHIPPED | OUTPATIENT
Start: 2023-01-04 | End: 2023-06-26 | Stop reason: SDUPTHER

## 2023-01-05 RX ORDER — LISINOPRIL 10 MG/1
10 TABLET ORAL DAILY
Qty: 90 TABLET | Refills: 2 | Status: SHIPPED | OUTPATIENT
Start: 2023-01-05 | End: 2023-10-20 | Stop reason: SDUPTHER

## 2023-01-05 NOTE — TELEPHONE ENCOUNTER
----- Message from NED Garcias sent at 1/3/2023  4:43 PM PST -----  Please schedule patient for follow up appointment to discuss lab results. This is not urgent.   NED Garcias

## 2023-01-12 ENCOUNTER — OFFICE VISIT (OUTPATIENT)
Dept: URGENT CARE | Facility: CLINIC | Age: 78
End: 2023-01-12
Payer: MEDICARE

## 2023-01-12 ENCOUNTER — HOSPITAL ENCOUNTER (OUTPATIENT)
Facility: MEDICAL CENTER | Age: 78
End: 2023-01-12
Attending: STUDENT IN AN ORGANIZED HEALTH CARE EDUCATION/TRAINING PROGRAM
Payer: MEDICARE

## 2023-01-12 VITALS
HEIGHT: 63 IN | BODY MASS INDEX: 24.27 KG/M2 | WEIGHT: 137 LBS | HEART RATE: 88 BPM | RESPIRATION RATE: 16 BRPM | TEMPERATURE: 97.4 F | OXYGEN SATURATION: 98 % | DIASTOLIC BLOOD PRESSURE: 80 MMHG | SYSTOLIC BLOOD PRESSURE: 138 MMHG

## 2023-01-12 DIAGNOSIS — N30.01 ACUTE CYSTITIS WITH HEMATURIA: ICD-10-CM

## 2023-01-12 LAB
APPEARANCE UR: CLEAR
BILIRUB UR STRIP-MCNC: NORMAL MG/DL
COLOR UR AUTO: YELLOW
GLUCOSE UR STRIP.AUTO-MCNC: 500 MG/DL
KETONES UR STRIP.AUTO-MCNC: NORMAL MG/DL
LEUKOCYTE ESTERASE UR QL STRIP.AUTO: NORMAL
NITRITE UR QL STRIP.AUTO: NORMAL
PH UR STRIP.AUTO: 6 [PH] (ref 5–8)
PROT UR QL STRIP: 30 MG/DL
RBC UR QL AUTO: NORMAL
SP GR UR STRIP.AUTO: 1.02
UROBILINOGEN UR STRIP-MCNC: 0.2 MG/DL

## 2023-01-12 PROCEDURE — 81002 URINALYSIS NONAUTO W/O SCOPE: CPT | Performed by: STUDENT IN AN ORGANIZED HEALTH CARE EDUCATION/TRAINING PROGRAM

## 2023-01-12 PROCEDURE — 87086 URINE CULTURE/COLONY COUNT: CPT

## 2023-01-12 PROCEDURE — 87186 SC STD MICRODIL/AGAR DIL: CPT | Mod: 91

## 2023-01-12 PROCEDURE — 87077 CULTURE AEROBIC IDENTIFY: CPT

## 2023-01-12 PROCEDURE — 99213 OFFICE O/P EST LOW 20 MIN: CPT | Performed by: STUDENT IN AN ORGANIZED HEALTH CARE EDUCATION/TRAINING PROGRAM

## 2023-01-12 RX ORDER — CEFDINIR 300 MG/1
300 CAPSULE ORAL 2 TIMES DAILY
Qty: 10 CAPSULE | Refills: 0 | Status: SHIPPED | OUTPATIENT
Start: 2023-01-12 | End: 2023-01-16

## 2023-01-12 ASSESSMENT — FIBROSIS 4 INDEX: FIB4 SCORE: 1.43

## 2023-01-12 NOTE — PROGRESS NOTES
"Subjective:   Kacey Whelan is a 77 y.o. female who presents for UTI (Today, burning, pain.)      HPI:  Pleasant 77-year-old female presents urgent care for 1 day of burning with urination.  She reports extreme urine tract infections with the last one occurring in 2021.  She reports receiving cefdinir at that time with full resolution.  She denied any side effects of the medication at the time.  She reports increasing her fluid intake and using cranberry pills.  She is able to maintain adequate oral intake of fluids and solids.  She denies fever, chills, nausea, vomiting, abdominal pain, diarrhea, constipation, vaginal discharge, vaginal odor, flank pain, back pain.      Medications:    aspirin  CALCIUM + D PO  cefdinir Caps  lisinopril Tabs  lovastatin  metFORMIN ER Tb24  Prolia Sosy    Allergies: Latex    Problem List: Kacey Whelan does not have any pertinent problems on file.    Surgical History:  Past Surgical History:   Procedure Laterality Date    PRIMARY C SECTION      3 c section    TONSILLECTOMY         Past Social Hx: Kacey Whelan  reports that she has never smoked. She has never used smokeless tobacco. She reports that she does not currently use alcohol after a past usage of about 1.2 oz per week. She reports that she does not use drugs.     Past Family Hx:  Kacey Whelan family history includes Cancer in her brother, brother, and mother; Diabetes in her brother, brother, and brother; Heart Attack in her father; Heart Disease in her brother and sister; Heart Disease (age of onset: 46) in her father.     Problem list, medications, and allergies reviewed by myself today in Epic.     Objective:     /80   Pulse 88   Temp 36.3 °C (97.4 °F) (Temporal)   Resp 16   Ht 1.6 m (5' 3\")   Wt 62.1 kg (137 lb)   SpO2 98%   BMI 24.27 kg/m²     Physical Exam  Vitals reviewed.   Constitutional:       General: She is not in acute distress.     Appearance: Normal appearance.   HENT:     "  Head: Normocephalic.      Nose: Nose normal.   Eyes:      Pupils: Pupils are equal, round, and reactive to light.   Cardiovascular:      Rate and Rhythm: Normal rate and regular rhythm.      Pulses: Normal pulses.      Heart sounds: Normal heart sounds. No murmur heard.  Pulmonary:      Effort: Pulmonary effort is normal.      Breath sounds: Normal breath sounds.   Abdominal:      Tenderness: There is no right CVA tenderness or left CVA tenderness.   Neurological:      Mental Status: She is alert and oriented to person, place, and time.       Lab Results/POC Test Results   Results for orders placed or performed in visit on 01/12/23   POCT Urinalysis   Result Value Ref Range    POC Color Yellow Negative    POC Appearance Clear Negative    POC Leukocyte Esterase Mod Negative    POC Nitrites Neg Negative    POC Urobiligen 0.2 Negative (0.2) mg/dL    POC Protein 30 Negative mg/dL    POC Urine PH 6.0 5.0 - 8.0    POC Blood Mod Negative    POC Specific Gravity 1.020 <1.005 - >1.030    POC Ketones Neg Negative mg/dL    POC Bilirubin Neg Negative mg/dL    POC Glucose 500 Negative mg/dL           Assessment/Plan:     Diagnosis and associated orders:     1. Acute cystitis with hematuria  POCT Urinalysis    cefdinir (OMNICEF) 300 MG Cap    URINE CULTURE(NEW)         Comments/MDM:     POCT urinalysis consistent with infection.  We will treat with cefdinir.  Patient has had this medication in the past and tolerated well.  She was educated on use this medication the possible side effects including allergic reaction.  May continue with increased fluids and cranberry pills.  Advised using a probiotic 1-2 times a day a few hours after her antibiotic dose.  Vitals are all stable.  No signs of pyelonephritis or kidney stones.  Patient is well-appearing and nontoxic.  ED/return precautions were given.  Urine culture conducted for further evaluation.         Differential diagnosis, natural history, supportive care, and indications for  immediate follow-up discussed.    Advised the patient to follow-up with the primary care physician for recheck, reevaluation, and consideration of further management.    Please note that this dictation was created using voice recognition software. I have made a reasonable attempt to correct obvious errors, but I expect that there are errors of grammar and possibly content that I did not discover before finalizing the note.    Electronically signed by Keanu Chavez PA-C.

## 2023-01-13 ENCOUNTER — SUPERVISING PHYSICIAN REVIEW (OUTPATIENT)
Dept: URGENT CARE | Facility: CLINIC | Age: 78
End: 2023-01-13
Payer: MEDICARE

## 2023-01-13 NOTE — PROGRESS NOTES
Addendum 1/13/2023 .Encounter notes reviewed, I was not present to physically examine patient myself. No obvious and/or significant quality issues found solely from doing a chart review. ALTA Titus M.D.

## 2023-01-15 LAB
BACTERIA UR CULT: ABNORMAL
SIGNIFICANT IND 70042: ABNORMAL
SITE SITE: ABNORMAL
SOURCE SOURCE: ABNORMAL

## 2023-01-16 RX ORDER — NITROFURANTOIN 25; 75 MG/1; MG/1
100 CAPSULE ORAL 2 TIMES DAILY
Qty: 10 CAPSULE | Refills: 0 | Status: SHIPPED | OUTPATIENT
Start: 2023-01-16 | End: 2023-01-21

## 2023-05-18 NOTE — TELEPHONE ENCOUNTER
Discharge Summary/Instructions after an Endoscopic Procedure  Patient Name: Maco Parrish  Patient MRN: [id number]  Patient YOB: 1968  Thursday, May 18, 2023  Amarjit Parrish MD  RESTRICTIONS:  During your procedure today, you received medications for sedation.  These   medications may affect your judgment, balance and coordination.  Therefore,   for 24 hours, you have the following restrictions:   - DO NOT drive a car, operate machinery, make legal/financial decisions,   sign important papers or drink alcohol.    ACTIVITY:  Today: no heavy lifting, straining or running due to procedural   sedation/anesthesia.  The following day: return to full activity including work.  DIET:  Eat and drink normally unless instructed otherwise.     TREATMENT FOR COMMON SIDE EFFECTS:  - Mild abdominal pain, nausea, belching, bloating or excessive gas:  rest,   eat lightly and use a heating pad.  - Sore Throat: treat with throat lozenges and/or gargle with warm salt   water.  - Because air was used during the procedure, expelling large amounts of air   from your rectum or belching is normal.  - If a bowel prep was taken, you may not have a bowel movement for 1-3 days.    This is normal.  SYMPTOMS TO WATCH FOR AND REPORT TO YOUR PHYSICIAN:  1. Abdominal pain or bloating, other than gas cramps.  2. Chest pain.  3. Back pain.  4. Signs of infection such as: chills or fever occurring within 24 hours   after the procedure.  5. Rectal bleeding, which would show as bright red, maroon, or black stools.   (A tablespoon of blood from the rectum is not serious, especially if   hemorrhoids are present.)  6. Vomiting.  7. Weakness or dizziness.  GO DIRECTLY TO THE NEAREST EMERGENCY ROOM IF YOU HAVE ANY OF THE FOLLOWING:      Difficulty breathing              Chills and/or fever over 101 F   Persistent vomiting and/or vomiting blood   Severe abdominal pain   Severe chest pain   Black, tarry stools   Bleeding- more than one  Was the patient seen in the last year in this department? Yes     Does patient have an active prescription for medications requested? No     Received Request Via: Patient   tablespoon   Any other symptom or condition that you feel may need urgent attention  Your doctor recommends these additional instructions:  If any biopsies were taken, your doctors clinic will contact you in 1 to 2   weeks with any results.  - Patient has a contact number available for emergencies.  The signs and   symptoms of potential delayed complications were discussed with the   patient.  Return to normal activities tomorrow.  Written discharge   instructions were provided to the patient.   - Resume previous diet.   - Continue present medications.   - Repeat colonoscopy in 3 months for surveillance.   - Return to GI clinic PRN.   - Discharge patient to home (with escort).  For questions, problems or results please call your physician - Amarjit Parrish MD at Work:  [phone].  [facility], EMERGENCY ROOM PHONE NUMBER: [phone]  IF A COMPLICATION OR EMERGENCY SITUATION ARISES AND YOU ARE UNABLE TO REACH   YOUR PHYSICIAN - GO DIRECTLY TO THE EMERGENCY ROOM.  MD Amarjit Huang MD  5/18/2023 9:18:33 AM  This report has been verified and signed electronically.  Dear patient,  As a result of recent federal legislation (The Federal Cures Act), you may   receive lab or pathology results from your procedure in your [facility]   account before your physician is able to contact you. Your physician or   their representative will relay the results to you with their   recommendations at their soonest availability.  Thank you,  [employer]

## 2023-08-31 ENCOUNTER — HOSPITAL ENCOUNTER (OUTPATIENT)
Dept: CARDIOLOGY | Facility: MEDICAL CENTER | Age: 78
End: 2023-08-31
Attending: NURSE PRACTITIONER
Payer: MEDICARE

## 2023-08-31 DIAGNOSIS — I35.0 AORTIC VALVE STENOSIS, ETIOLOGY OF CARDIAC VALVE DISEASE UNSPECIFIED: ICD-10-CM

## 2023-08-31 LAB
LV EJECT FRACT  99904: 75
LV EJECT FRACT MOD 2C 99903: 80.76
LV EJECT FRACT MOD 4C 99902: 77.16
LV EJECT FRACT MOD BP 99901: 77.87

## 2023-08-31 PROCEDURE — 93306 TTE W/DOPPLER COMPLETE: CPT | Mod: 26 | Performed by: INTERNAL MEDICINE

## 2023-08-31 PROCEDURE — 93306 TTE W/DOPPLER COMPLETE: CPT

## 2023-10-15 SDOH — ECONOMIC STABILITY: HOUSING INSECURITY: IN THE LAST 12 MONTHS, HOW MANY PLACES HAVE YOU LIVED?: 1

## 2023-10-15 SDOH — ECONOMIC STABILITY: FOOD INSECURITY: WITHIN THE PAST 12 MONTHS, YOU WORRIED THAT YOUR FOOD WOULD RUN OUT BEFORE YOU GOT MONEY TO BUY MORE.: NEVER TRUE

## 2023-10-15 SDOH — HEALTH STABILITY: PHYSICAL HEALTH: ON AVERAGE, HOW MANY MINUTES DO YOU ENGAGE IN EXERCISE AT THIS LEVEL?: 30 MIN

## 2023-10-15 SDOH — ECONOMIC STABILITY: INCOME INSECURITY: HOW HARD IS IT FOR YOU TO PAY FOR THE VERY BASICS LIKE FOOD, HOUSING, MEDICAL CARE, AND HEATING?: NOT VERY HARD

## 2023-10-15 SDOH — ECONOMIC STABILITY: FOOD INSECURITY: WITHIN THE PAST 12 MONTHS, THE FOOD YOU BOUGHT JUST DIDN'T LAST AND YOU DIDN'T HAVE MONEY TO GET MORE.: NEVER TRUE

## 2023-10-15 SDOH — ECONOMIC STABILITY: TRANSPORTATION INSECURITY
IN THE PAST 12 MONTHS, HAS THE LACK OF TRANSPORTATION KEPT YOU FROM MEDICAL APPOINTMENTS OR FROM GETTING MEDICATIONS?: NO

## 2023-10-15 SDOH — ECONOMIC STABILITY: INCOME INSECURITY: IN THE LAST 12 MONTHS, WAS THERE A TIME WHEN YOU WERE NOT ABLE TO PAY THE MORTGAGE OR RENT ON TIME?: NO

## 2023-10-15 SDOH — HEALTH STABILITY: PHYSICAL HEALTH: ON AVERAGE, HOW MANY DAYS PER WEEK DO YOU ENGAGE IN MODERATE TO STRENUOUS EXERCISE (LIKE A BRISK WALK)?: 7 DAYS

## 2023-10-15 ASSESSMENT — SOCIAL DETERMINANTS OF HEALTH (SDOH)
IN A TYPICAL WEEK, HOW MANY TIMES DO YOU TALK ON THE PHONE WITH FAMILY, FRIENDS, OR NEIGHBORS?: TWICE A WEEK
HOW OFTEN DO YOU GET TOGETHER WITH FRIENDS OR RELATIVES?: PATIENT DECLINED
HOW MANY DRINKS CONTAINING ALCOHOL DO YOU HAVE ON A TYPICAL DAY WHEN YOU ARE DRINKING: PATIENT DOES NOT DRINK
HOW OFTEN DO YOU ATTEND CHURCH OR RELIGIOUS SERVICES?: PATIENT DECLINED
WITHIN THE PAST 12 MONTHS, YOU WORRIED THAT YOUR FOOD WOULD RUN OUT BEFORE YOU GOT THE MONEY TO BUY MORE: NEVER TRUE
HOW OFTEN DO YOU HAVE SIX OR MORE DRINKS ON ONE OCCASION: NEVER
HOW OFTEN DO YOU ATTENT MEETINGS OF THE CLUB OR ORGANIZATION YOU BELONG TO?: NEVER
HOW OFTEN DO YOU HAVE A DRINK CONTAINING ALCOHOL: NEVER
DO YOU BELONG TO ANY CLUBS OR ORGANIZATIONS SUCH AS CHURCH GROUPS UNIONS, FRATERNAL OR ATHLETIC GROUPS, OR SCHOOL GROUPS?: NO
DO YOU BELONG TO ANY CLUBS OR ORGANIZATIONS SUCH AS CHURCH GROUPS UNIONS, FRATERNAL OR ATHLETIC GROUPS, OR SCHOOL GROUPS?: NO
HOW OFTEN DO YOU GET TOGETHER WITH FRIENDS OR RELATIVES?: PATIENT DECLINED
HOW HARD IS IT FOR YOU TO PAY FOR THE VERY BASICS LIKE FOOD, HOUSING, MEDICAL CARE, AND HEATING?: NOT VERY HARD
HOW OFTEN DO YOU ATTENT MEETINGS OF THE CLUB OR ORGANIZATION YOU BELONG TO?: NEVER
HOW OFTEN DO YOU ATTEND CHURCH OR RELIGIOUS SERVICES?: PATIENT DECLINED
IN A TYPICAL WEEK, HOW MANY TIMES DO YOU TALK ON THE PHONE WITH FAMILY, FRIENDS, OR NEIGHBORS?: TWICE A WEEK

## 2023-10-15 ASSESSMENT — LIFESTYLE VARIABLES
HOW MANY STANDARD DRINKS CONTAINING ALCOHOL DO YOU HAVE ON A TYPICAL DAY: PATIENT DOES NOT DRINK
SKIP TO QUESTIONS 9-10: 1
HOW OFTEN DO YOU HAVE A DRINK CONTAINING ALCOHOL: NEVER
AUDIT-C TOTAL SCORE: 0
HOW OFTEN DO YOU HAVE SIX OR MORE DRINKS ON ONE OCCASION: NEVER

## 2023-10-16 DIAGNOSIS — E11.9 TYPE 2 DIABETES MELLITUS WITHOUT COMPLICATION, WITHOUT LONG-TERM CURRENT USE OF INSULIN (HCC): ICD-10-CM

## 2023-10-16 RX ORDER — METFORMIN HYDROCHLORIDE 500 MG/1
500 TABLET, EXTENDED RELEASE ORAL DAILY
Qty: 120 TABLET | Refills: 0 | Status: SHIPPED | OUTPATIENT
Start: 2023-10-16 | End: 2024-02-20

## 2023-10-16 NOTE — TELEPHONE ENCOUNTER
Received request via: Pharmacy    Was the patient seen in the last year in this department? No    Does the patient have an active prescription (recently filled or refills available) for medication(s) requested? yes    Does the patient have [facility] and need 100 day supply (blood pressure, diabetes and cholesterol meds only)? Patient does not have SCP   Requested Prescriptions     Pending Prescriptions Disp Refills    metFORMIN ER (GLUCOPHAGE XR) 500 MG TABLET SR 24  Tablet 0     Sig: Take 1 Tablet by mouth every day.

## 2023-10-17 NOTE — TELEPHONE ENCOUNTER
Received request via: Pharmacy    Was the patient seen in the last year in this department? Yes    Does the patient have an active prescription (recently filled or refills available) for medication(s) requested? yes    Does the patient have [facility] and need 100 day supply (blood pressure, diabetes and cholesterol meds only)? Patient does not have SCP   Requested Prescriptions     Pending Prescriptions Disp Refills    lisinopril (PRINIVIL) 10 MG Tab 90 Tablet 2     Sig: Take 1 Tablet by mouth every day.

## 2023-10-18 ENCOUNTER — OFFICE VISIT (OUTPATIENT)
Dept: MEDICAL GROUP | Facility: MEDICAL CENTER | Age: 78
End: 2023-10-18
Payer: MEDICARE

## 2023-10-18 ENCOUNTER — HOSPITAL ENCOUNTER (OUTPATIENT)
Facility: MEDICAL CENTER | Age: 78
End: 2023-10-18
Attending: NURSE PRACTITIONER
Payer: MEDICARE

## 2023-10-18 VITALS
OXYGEN SATURATION: 96 % | TEMPERATURE: 97.2 F | BODY MASS INDEX: 24.8 KG/M2 | DIASTOLIC BLOOD PRESSURE: 58 MMHG | SYSTOLIC BLOOD PRESSURE: 110 MMHG | HEIGHT: 63 IN | WEIGHT: 140 LBS | HEART RATE: 68 BPM

## 2023-10-18 DIAGNOSIS — Z82.49 FAMILY HISTORY OF AORTIC VALVE DISORDER: ICD-10-CM

## 2023-10-18 DIAGNOSIS — Z23 NEED FOR VACCINATION: ICD-10-CM

## 2023-10-18 DIAGNOSIS — N30.01 ACUTE CYSTITIS WITH HEMATURIA: ICD-10-CM

## 2023-10-18 DIAGNOSIS — E11.65 TYPE 2 DIABETES MELLITUS WITH HYPERGLYCEMIA, WITHOUT LONG-TERM CURRENT USE OF INSULIN (HCC): ICD-10-CM

## 2023-10-18 DIAGNOSIS — I10 PRIMARY HYPERTENSION: ICD-10-CM

## 2023-10-18 DIAGNOSIS — N39.46 MIXED STRESS AND URGE URINARY INCONTINENCE: ICD-10-CM

## 2023-10-18 DIAGNOSIS — M81.0 AGE-RELATED OSTEOPOROSIS WITHOUT CURRENT PATHOLOGICAL FRACTURE: ICD-10-CM

## 2023-10-18 DIAGNOSIS — E55.9 VITAMIN D DEFICIENCY: ICD-10-CM

## 2023-10-18 DIAGNOSIS — E78.00 HYPERCHOLESTEREMIA: ICD-10-CM

## 2023-10-18 DIAGNOSIS — I35.0 NONRHEUMATIC AORTIC VALVE STENOSIS: ICD-10-CM

## 2023-10-18 DIAGNOSIS — Z87.828 HISTORY OF LACERATION OF SKIN: ICD-10-CM

## 2023-10-18 DIAGNOSIS — R09.89 RHONCHI: ICD-10-CM

## 2023-10-18 DIAGNOSIS — Z00.00 MEDICARE ANNUAL WELLNESS VISIT, SUBSEQUENT: ICD-10-CM

## 2023-10-18 DIAGNOSIS — R30.0 DYSURIA: ICD-10-CM

## 2023-10-18 LAB
HBA1C MFR BLD: 6.4 % (ref ?–5.8)
POCT INT CON NEG: NEGATIVE
POCT INT CON POS: POSITIVE

## 2023-10-18 PROCEDURE — 87186 SC STD MICRODIL/AGAR DIL: CPT

## 2023-10-18 PROCEDURE — 90472 IMMUNIZATION ADMIN EACH ADD: CPT | Performed by: NURSE PRACTITIONER

## 2023-10-18 PROCEDURE — 87086 URINE CULTURE/COLONY COUNT: CPT

## 2023-10-18 PROCEDURE — 90715 TDAP VACCINE 7 YRS/> IM: CPT | Performed by: NURSE PRACTITIONER

## 2023-10-18 PROCEDURE — 87077 CULTURE AEROBIC IDENTIFY: CPT | Mod: 91

## 2023-10-18 PROCEDURE — 90746 HEPB VACCINE 3 DOSE ADULT IM: CPT | Performed by: NURSE PRACTITIONER

## 2023-10-18 PROCEDURE — G0010 ADMIN HEPATITIS B VACCINE: HCPCS | Performed by: NURSE PRACTITIONER

## 2023-10-18 PROCEDURE — G0439 PPPS, SUBSEQ VISIT: HCPCS | Mod: 25 | Performed by: NURSE PRACTITIONER

## 2023-10-18 PROCEDURE — 83036 HEMOGLOBIN GLYCOSYLATED A1C: CPT | Performed by: NURSE PRACTITIONER

## 2023-10-18 PROCEDURE — 3074F SYST BP LT 130 MM HG: CPT | Performed by: NURSE PRACTITIONER

## 2023-10-18 PROCEDURE — 3078F DIAST BP <80 MM HG: CPT | Performed by: NURSE PRACTITIONER

## 2023-10-18 RX ORDER — FAMOTIDINE 10 MG
TABLET ORAL
COMMUNITY

## 2023-10-18 RX ORDER — SULFAMETHOXAZOLE AND TRIMETHOPRIM 800; 160 MG/1; MG/1
1 TABLET ORAL 2 TIMES DAILY
Qty: 6 TABLET | Refills: 0 | Status: SHIPPED | OUTPATIENT
Start: 2023-10-18 | End: 2023-10-21

## 2023-10-18 RX ORDER — ANTACID TABLETS 648 MG/1
TABLET, CHEWABLE ORAL
COMMUNITY

## 2023-10-18 ASSESSMENT — ENCOUNTER SYMPTOMS: GENERAL WELL-BEING: GOOD

## 2023-10-18 ASSESSMENT — PATIENT HEALTH QUESTIONNAIRE - PHQ9: CLINICAL INTERPRETATION OF PHQ2 SCORE: 0

## 2023-10-18 ASSESSMENT — ACTIVITIES OF DAILY LIVING (ADL): BATHING_REQUIRES_ASSISTANCE: 0

## 2023-10-18 ASSESSMENT — FIBROSIS 4 INDEX: FIB4 SCORE: 1.44

## 2023-10-18 NOTE — PROGRESS NOTES
Chief Complaint   Patient presents with    Annual Exam    UTI     Painful urination, frequent urination,     Medication Refill     lisinopril       HPI:  Kacey Whelan is a 78 y.o. here for Medicare Annual Wellness Visit     Patient Active Problem List    Diagnosis Date Noted    Mixed stress and urge urinary incontinence 10/02/2019    Age-related osteoporosis without current pathological fracture 06/26/2017    Vitamin D deficiency 03/30/2017    Type 2 diabetes mellitus with hyperglycemia, without long-term current use of insulin (HCC) 10/14/2016    Hypercholesteremia 10/14/2016    HTN (hypertension) 03/06/2015    Aortic stenosis 03/06/2015    Hearing loss 03/06/2015       Current Outpatient Medications   Medication Sig Dispense Refill    FAMOTIDINE PO       famotidine (PEPCID) 10 MG tablet       [employer] TRIPLE MAGNESIUM COMPLEX PO       Lactobacillus (PROBIOTIC ACIDOPHILUS PO)       Thiamine HCl (B-1 PO)       B Complex Vitamins (B COMPLEX B-12 PO)       Calcium Carbonate Antacid 648 MG Tab       metFORMIN ER (GLUCOPHAGE XR) 500 MG TABLET SR 24 HR Take 1 Tablet by mouth every day. 120 Tablet 0    lovastatin (MEVACOR) 10 MG tablet Take 1 Tablet by mouth every day. 90 Tablet 1    lisinopril (PRINIVIL) 10 MG Tab Take 1 Tablet by mouth every day. 90 Tablet 2    denosumab (PROLIA) 60 MG/ML Solution Prefilled Syringe injection Inject 1 mL under the skin every 6 months. (Patient not taking: Reported on 1/12/2023) 1 mL 1    Calcium Citrate-Vitamin D (CALCIUM + D PO) Take  by mouth.      aspirin 81 MG tablet Take 1 Tab by mouth every day. 100 Tab 3     No current facility-administered medications for this visit.          Current supplements as per medication list.     Allergies: Latex    Current social contact/activities: Walking, talking with friends and family     She  reports that she has never smoked. She has never used smokeless tobacco. She reports that she does not currently use alcohol after a past usage of about  1.2 oz of alcohol per week. She reports that she does not use drugs.  Counseling given: Not Answered      ROS:    Gait: Uses no assistive device  Ostomy: No  Other tubes: No  Amputations: No  Chronic oxygen use: No  Last eye exam: 2023  Wears hearing aids: Yes   : Reports urinary leakage during the last 6 months that has somewhat interfered with their daily activities or sleep.    Screening:    Depression Screening  Little interest or pleasure in doing things?  0 - not at all  Feeling down, depressed , or hopeless? 0 - not at all  Trouble falling or staying asleep, or sleeping too much?     Feeling tired or having little energy?     Poor appetite or overeating?     Feeling bad about yourself - or that you are a failure or have let yourself or your family down?    Trouble concentrating on things, such as reading the newspaper or watching television?    Moving or speaking so slowly that other people could have noticed.  Or the opposite - being so fidgety or restless that you have been moving around a lot more than usual?     Thoughts that you would be better off dead, or of hurting yourself?     Patient Health Questionnaire Score:      If depressive symptoms identified deferred to follow up visit unless specifically addressed in assessment and plan.    Interpretation of PHQ-9 Total Score   Score Severity   1-4 No Depression   5-9 Mild Depression   10-14 Moderate Depression   15-19 Moderately Severe Depression   20-27 Severe Depression    Screening for Cognitive Impairment  Do you or any of your friends or family members have any concern about your memory? No  Three Minute Recall (Banana, Sunrise, Chair) 3/3    Tonny clock face with all 12 numbers and set the hands to show 20 past 8.  Yes    Cognitive concerns identified deferred for follow up unless specifically addressed in assessment and plan.    Fall Risk Assessment  Has the patient had two or more falls in the last year or any fall with injury in the last year?   No    Safety Assessment  Do you always wear your seatbelt?  Yes  Any changes to home needed to function safely? No  Difficulty hearing.  Yes  Patient counseled about all safety risks that were identified.    Functional Assessment ADLs  Are there any barriers preventing you from cooking for yourself or meeting nutritional needs?  No.    Are there any barriers preventing you from driving safely or obtaining transportation?  No.    Are there any barriers preventing you from using a telephone or calling for help?  No    Are there any barriers preventing you from shopping?  No.    Are there any barriers preventing you from taking care of your own finances?  No    Are there any barriers preventing you from managing your medications?  No    Are there any barriers preventing you from showering, bathing or dressing yourself? No    Are there any barriers preventing you from doing housework or laundry? No  Are there any barriers preventing you from using the toilet?No  Are you currently engaging in any exercise or physical activity?  Yes. Walk everyday    Self-Assessment of Health  What is your perception of your health? Good  Do you sleep more than six hours a night? No  In the past 7 days, how much did pain keep you from doing your normal work? None  Do you spend quality time with family or friends (virtually or in person)? Yes  Do you usually eat a heart healthy diet that constists of a variety of fruits, vegetables, whole grains and fiber? Yes  Do you eat foods high in fat and/or Fast Food more than three times per week? No    Advance Care Planning  Do you have an Advance Directive, Living Will, Durable Power of , or POLST? Yes  Advance Directive       is not on file - instructed patient to bring in a copy to scan into their chart      Health Maintenance Summary            Ordered - IMM DTaP/Tdap/Td Vaccine (1 - Tdap) Ordered on 10/18/2023      No completion history exists for this topic.              Overdue -  Zoster (Shingles) Vaccines (1 of 2) Overdue - never done      No completion history exists for this topic.              Ordered - Hepatitis B Vaccine (Hep B) (1 of 3 - Risk 3-dose series) Ordered on 10/18/2023      No completion history exists for this topic.              Overdue - COVID-19 Vaccine (3 - Moderna series) Overdue since 6/16/2021 04/21/2021  Imm Admin: MODERNA SARS-COV-2 VACCINE (12+)    02/26/2021  Imm Admin: MODERNA SARS-COV-2 VACCINE (12+)              Overdue - Diabetes: Retinopathy Screening (Every 6 Months) Overdue since 2/1/2023 08/01/2022  REFERRAL FOR RETINAL SCREENING EXAM    10/24/2018  REFERRAL FOR RETINAL SCREENING EXAM    11/30/2016  REFERRAL FOR RETINAL SCREENING EXAM              Ordered - A1c Screening (Every 6 Months) Ordered on 10/18/2023      10/13/2022  POCT Hemoglobin A1C    10/05/2021  HEMOGLOBIN A1C    10/20/2020  HEMOGLOBIN A1C    09/24/2019  HEMOGLOBIN A1C    04/04/2019  POCT Hemoglobin A1C    Only the first 5 history entries have been loaded, but more history exists.              Overdue - Diabetes: Monofilament / LE Exam (Yearly) Overdue since 10/13/2023      10/13/2022  Diabetic Monofilament Lower Extremity Exam    10/13/2022  [employer]: WORKFLOW - DIABETES - DIABETIC FOOT EXAM PERFORMED    10/07/2020  [employer]: WORKFLOW - DIABETES - DIABETIC FOOT EXAM PERFORMED    05/22/2018  Diabetic Monofilament Lower Extremity Exam    12/27/2016  Diabetic Monofilament Lower Extremity Exam    Only the first 5 history entries have been loaded, but more history exists.              Overdue - Annual Wellness Visit (Every 366 Days) Overdue since 10/14/2023      10/13/2022  Visit Dx: Medicare annual wellness visit, subsequent    10/13/2021  Visit Dx: Medicare annual wellness visit, subsequent    10/07/2020  Subsequent Annual Wellness Visit - Includes PPPS ()    10/07/2020  Visit Dx: Medicare annual wellness visit, subsequent    10/02/2019  Subsequent Annual Wellness Visit -  Includes PPPS ()    Only the first 5 history entries have been loaded, but more history exists.              Ordered - Fasting Lipid Profile (Yearly) Ordered on 10/18/2023      11/04/2022  Lipid Profile    10/05/2021  Lipid Profile    10/20/2020  Lipid Profile    09/24/2019  Lipid Profile    10/23/2018  LIPID PROFILE    Only the first 5 history entries have been loaded, but more history exists.              Ordered - Diabetes: Urine Protein Screening (Yearly) Ordered on 10/18/2023      11/04/2022  MICROALBUMIN CREAT RATIO URINE    10/05/2021  MICROALBUMIN CREAT RATIO URINE    10/20/2020  MICROALBUMIN CREAT RATIO URINE    09/24/2019  MICROALBUMIN CREAT RATIO URINE    10/23/2018  MICROALBUMIN CREAT RATIO URINE    Only the first 5 history entries have been loaded, but more history exists.              Ordered - SERUM CREATININE (Yearly) Ordered on 10/18/2023      11/04/2022  Comp Metabolic Panel    10/05/2021  Comp Metabolic Panel    10/20/2020  Comp Metabolic Panel    09/24/2019  Comp Metabolic Panel    10/23/2018  COMP METABOLIC PANEL    Only the first 5 history entries have been loaded, but more history exists.              Bone Density Scan (Every 2 Years) Next due on 6/16/2024 06/16/2022  DS-BONE DENSITY STUDY (DEXA)    10/08/2019  DS-BONE DENSITY STUDY (DEXA)    02/14/2017  DS-BONE DENSITY STUDY (DEXA)              Pneumococcal Vaccine: 65+ Years (Series Information) Completed      03/30/2017  Imm Admin: Pneumococcal Conjugate Vaccine (Prevnar/PCV-13)    09/05/2016  Imm Admin: Pneumococcal polysaccharide vaccine (PPSV-23)              Hepatitis C Screening  Tentatively Complete      10/20/2020  Hepatitis C Antibody component of HEP C VIRUS ANTIBODY              Influenza Vaccine (Series Information) Completed      08/23/2023  Outside Immunization: Fluzone High-Dose Quad    08/24/2022  Imm Admin: Influenza Vaccine Adult HD    09/26/2020  Imm Admin: Influenza Vaccine Adult HD    09/03/2019  Imm Admin:  Influenza Vaccine Adult HD    10/01/2018  Imm Admin: Influenza Vaccine Adult HD    Only the first 5 history entries have been loaded, but more history exists.              Hepatitis A Vaccine (Hep A) (Series Information) Aged Out      No completion history exists for this topic.              HPV Vaccines (Series Information) Aged Out      No completion history exists for this topic.              Polio Vaccine (Inactivated Polio) (Series Information) Aged Out      No completion history exists for this topic.              Meningococcal Immunization (Series Information) Aged Out      No completion history exists for this topic.              Discontinued - Mammogram  Discontinued        Frequency changed to Never automatically (Topic No Longer Applies)    06/16/2022  MA-SCREENING MAMMO BILAT W/TOMOSYNTHESIS W/CAD    05/03/2019  MA-SCREENING MAMMO BILAT W/TOMOSYNTHESIS W/CAD    02/14/2017  MA-MAMMO SCREENING BILAT W/ABIMBOLA W/CAD                    Patient Care Team:  NED Garcias as PCP - General (Family Medicine)  Hi Medina M.D. (Inactive) as Consulting Physician (Ophthalmology)  Zaire Brown M.D. (Otolaryngology)      Social History     Tobacco Use    Smoking status: Never    Smokeless tobacco: Never   Vaping Use    Vaping Use: Never used   Substance Use Topics    Alcohol use: Not Currently     Alcohol/week: 1.2 oz     Types: 2 Glasses of wine per week    Drug use: Never     Family History   Problem Relation Age of Onset    Cancer Mother         breast cancer    Heart Disease Father 46        MI    Heart Attack Father     Heart Disease Sister         2 heart surgery    Diabetes Brother     Cancer Brother         brain tumor    Heart Disease Brother         HEART STENT    Diabetes Brother     Diabetes Brother     Cancer Brother         anal     She  has a past medical history of Diabetes (HCC), Hearing loss, Heart murmur, Hyperlipidemia, Hypertension, and Osteoporosis.   Past Surgical History:  "  Procedure Laterality Date    PRIMARY C SECTION      3 c section    TONSILLECTOMY         Exam:   /58 (BP Location: Left arm, Patient Position: Sitting, BP Cuff Size: Adult)   Pulse 68   Temp 36.2 °C (97.2 °F) (Temporal)   Ht 1.6 m (5' 3\")   Wt 63.5 kg (140 lb)   SpO2 96%  Body mass index is 24.8 kg/m².    Hearing poor.    Dentition good  Alert, oriented in no acute distress.  Eye contact is good, speech goal directed, affect calm    Assessment and Plan. The following treatment and monitoring plan is recommended:      1. Medicare annual wellness visit, subsequent  Annual labs ordered and health maintenance reviewed and updated.   Patient and I discussed the importance of lifestyle changes, with particular emphasis on decreasing sugar and carbohydrate intake and increasing plant-based nutrition (for the purposes of weight loss, general health, and prevention of chronic illnesses), as well as regular cardiovascular exercise, proper sleep, and stress management. Patient verbalized understanding.    2. Primary hypertension  Stable on lisinopril 10 mg daily  - CBC WITH DIFFERENTIAL; Future  - Comp Metabolic Panel; Future  - TSH WITH REFLEX TO FT4; Future  - MICROALBUMIN CREAT RATIO URINE; Future    3. Hypercholesteremia  Stable on lovastatin, due for labs  - Lipid Profile; Future    4. Type 2 diabetes mellitus with hyperglycemia, without long-term current use of insulin (HCC)  Stable on metformin  mg daily  A1C 6.4  Had retinal eye exam done, records requested  - MICROALBUMIN CREAT RATIO URINE; Future  - POCT Hemoglobin A1C    5. Mixed stress and urge urinary incontinence  Unstable  Failed oxybutynin due to dry mouth side effect  Discussed trial of Myrbetriq and/or referral to urogyn for this.  She would like to hold off for now and discuss at follow-up appointment    6. Acute cystitis with hematuria  Unstable  UA positive for blood and leukocytes  Check culture  Bactrim twice daily for 3 days  - " sulfamethoxazole-trimethoprim (BACTRIM DS) 800-160 MG tablet; Take 1 Tablet by mouth 2 times a day for 3 days.  Dispense: 6 Tablet; Refill: 0  - URINE CULTURE(NEW); Future    7. Dysuria  - POCT Urinalysis    8. Age-related osteoporosis without current pathological fracture  Unstable  Prolia infusions ordered last year, patient was not called and scheduled for this.   New order faxed to infusion center today.     9. Nonrheumatic aortic valve stenosis  Stable, mild, asymptomatic  Echo up to date, last Aug 2023, reviewed with pt today  Not established with cardiology, referral placed for monitoring  - REFERRAL TO CARDIOLOGY    10. Family history of aortic valve disorder  - REFERRAL TO CARDIOLOGY    11. Vitamin D deficiency  - VITAMIN D,25 HYDROXY (DEFICIENCY); Future    12. History of laceration of skin  - Tdap =>10yo IM    13. Need for vaccination  - Hep B Adult 20+    14. Rhonchi  Unstable  Incidental finding on exam today, only in right upper lung field. She is asymptomatic, no cough or congestion. No known lung disease. Did not clear with coughing.   Check Chest xray to eval further  - DX-CHEST-2 VIEWS; Future        Services suggested: No services needed at this time  Health Care Screening: Age-appropriate preventive services recommended by USPTF and ACIP covered by Medicare were discussed today. Services ordered if indicated and agreed upon by the patient.  Referrals offered: Community-based lifestyle interventions to reduce health risks and promote self-management and wellness, fall prevention, nutrition, physical activity, tobacco-use cessation, weight loss, and mental health services as per orders if indicated.    Discussion today about general wellness and lifestyle habits:    Prevent falls and reduce trip hazards; Cautioned about securing or removing rugs.  Have a working fire alarm and carbon monoxide detector;   Engage in regular physical activity and social activities     Follow-up: Return in about 3  months (around 1/18/2024) for Results Review.

## 2023-10-19 DIAGNOSIS — E11.9 TYPE 2 DIABETES MELLITUS WITHOUT COMPLICATION, WITHOUT LONG-TERM CURRENT USE OF INSULIN (HCC): ICD-10-CM

## 2023-10-19 DIAGNOSIS — E78.00 HYPERCHOLESTEREMIA: ICD-10-CM

## 2023-10-19 NOTE — TELEPHONE ENCOUNTER
Received request via: Pharmacy    Was the patient seen in the last year in this department? Yes    Does the patient have an active prescription (recently filled or refills available) for medication(s) requested? yes    Does the patient have [facility] and need 100 day supply (blood pressure, diabetes and cholesterol meds only)? Patient does not have SCP   Requested Prescriptions     Pending Prescriptions Disp Refills    lovastatin (MEVACOR) 10 MG tablet [Pharmacy Med Name: LOVASTATIN 10 MG TABLET] 90 Tablet 1     Sig: Take 1 Tablet by mouth every day.

## 2023-10-19 NOTE — TELEPHONE ENCOUNTER
Received request via: Pharmacy    Was the patient seen in the last year in this department? Yes    Does the patient have an active prescription (recently filled or refills available) for medication(s) requested?ye    Does the patient have [facility] Plus and need 100 day supply (blood pressure, diabetes and cholesterol meds only)? Patient does not have SCP      Requested Prescriptions     Pending Prescriptions Disp Refills    lisinopril (PRINIVIL) 10 MG Tab 90 Tablet 2     Sig: Take 1 Tablet by mouth every day.

## 2023-10-20 ENCOUNTER — APPOINTMENT (OUTPATIENT)
Dept: RADIOLOGY | Facility: MEDICAL CENTER | Age: 78
End: 2023-10-20
Attending: NURSE PRACTITIONER
Payer: MEDICARE

## 2023-10-20 DIAGNOSIS — R09.89 RHONCHI: ICD-10-CM

## 2023-10-20 PROCEDURE — 71046 X-RAY EXAM CHEST 2 VIEWS: CPT

## 2023-10-20 RX ORDER — LISINOPRIL 10 MG/1
10 TABLET ORAL DAILY
Qty: 90 TABLET | Refills: 2 | OUTPATIENT
Start: 2023-10-20

## 2023-10-20 RX ORDER — LISINOPRIL 10 MG/1
10 TABLET ORAL DAILY
Qty: 90 TABLET | Refills: 2 | Status: SHIPPED | OUTPATIENT
Start: 2023-10-20

## 2023-10-20 RX ORDER — LISINOPRIL 10 MG/1
10 TABLET ORAL DAILY
Qty: 90 TABLET | Refills: 0 | OUTPATIENT
Start: 2023-10-20

## 2023-10-20 NOTE — TELEPHONE ENCOUNTER
Received request via: Pharmacy    Was the patient seen in the last year in this department? Yes 10/18/2023    Does the patient have an active prescription (recently filled or refills available) for medication(s) requested? No    Does the patient have [facility] and need 100 day supply (blood pressure, diabetes and cholesterol meds only)? Patient does not have SCP

## 2023-10-23 DIAGNOSIS — E78.00 HYPERCHOLESTEREMIA: ICD-10-CM

## 2023-10-23 DIAGNOSIS — E11.9 TYPE 2 DIABETES MELLITUS WITHOUT COMPLICATION, WITHOUT LONG-TERM CURRENT USE OF INSULIN (HCC): ICD-10-CM

## 2023-10-23 NOTE — TELEPHONE ENCOUNTER
Received request via: Pharmacy    Was the patient seen in the last year in this department? Yes    Does the patient have an active prescription (recently filled or refills available) for medication(s) requested? yes    Does the patient have [facility] Plus and need 100 day supply (blood pressure, diabetes and cholesterol meds only)? Patient does not have SCP   Requested Prescriptions     Pending Prescriptions Disp Refills    lovastatin (MEVACOR) 10 MG tablet 90 Tablet 1     Sig: Take 1 Tablet by mouth every day.

## 2023-10-24 RX ORDER — LOVASTATIN 10 MG/1
10 TABLET ORAL DAILY
Qty: 90 TABLET | Refills: 1 | Status: SHIPPED | OUTPATIENT
Start: 2023-10-24 | End: 2023-11-03

## 2023-10-25 RX ORDER — LOVASTATIN 10 MG/1
10 TABLET ORAL DAILY
Qty: 90 TABLET | Refills: 1 | OUTPATIENT
Start: 2023-10-25

## 2023-10-30 ENCOUNTER — TELEPHONE (OUTPATIENT)
Dept: CARDIOLOGY | Facility: MEDICAL CENTER | Age: 78
End: 2023-10-30
Payer: MEDICARE

## 2023-10-30 NOTE — TELEPHONE ENCOUNTER
STANLEYM for pt to call back and marilee new pt appt. Sent [employer] /cg 10/30/23

## 2023-11-03 ENCOUNTER — OFFICE VISIT (OUTPATIENT)
Dept: CARDIOLOGY | Facility: MEDICAL CENTER | Age: 78
End: 2023-11-03
Attending: NURSE PRACTITIONER
Payer: MEDICARE

## 2023-11-03 VITALS
HEART RATE: 74 BPM | HEIGHT: 63 IN | BODY MASS INDEX: 24.63 KG/M2 | OXYGEN SATURATION: 96 % | WEIGHT: 139 LBS | DIASTOLIC BLOOD PRESSURE: 74 MMHG | RESPIRATION RATE: 12 BRPM | SYSTOLIC BLOOD PRESSURE: 114 MMHG

## 2023-11-03 DIAGNOSIS — I35.0 NONRHEUMATIC AORTIC (VALVE) STENOSIS: ICD-10-CM

## 2023-11-03 DIAGNOSIS — I10 PRIMARY HYPERTENSION: ICD-10-CM

## 2023-11-03 DIAGNOSIS — E11.9 TYPE 2 DIABETES MELLITUS WITHOUT COMPLICATION, WITHOUT LONG-TERM CURRENT USE OF INSULIN (HCC): ICD-10-CM

## 2023-11-03 DIAGNOSIS — E78.00 HYPERCHOLESTEREMIA: ICD-10-CM

## 2023-11-03 LAB — EKG IMPRESSION: NORMAL

## 2023-11-03 PROCEDURE — 93010 ELECTROCARDIOGRAM REPORT: CPT | Performed by: INTERNAL MEDICINE

## 2023-11-03 PROCEDURE — 99213 OFFICE O/P EST LOW 20 MIN: CPT | Performed by: INTERNAL MEDICINE

## 2023-11-03 PROCEDURE — 93005 ELECTROCARDIOGRAM TRACING: CPT | Performed by: INTERNAL MEDICINE

## 2023-11-03 PROCEDURE — 99204 OFFICE O/P NEW MOD 45 MIN: CPT | Performed by: INTERNAL MEDICINE

## 2023-11-03 PROCEDURE — 3078F DIAST BP <80 MM HG: CPT | Performed by: INTERNAL MEDICINE

## 2023-11-03 PROCEDURE — 3074F SYST BP LT 130 MM HG: CPT | Performed by: INTERNAL MEDICINE

## 2023-11-03 RX ORDER — LOVASTATIN 20 MG/1
20 TABLET ORAL NIGHTLY
Qty: 90 TABLET | Refills: 3 | Status: SHIPPED | OUTPATIENT
Start: 2023-11-03

## 2023-11-03 ASSESSMENT — ENCOUNTER SYMPTOMS
SHORTNESS OF BREATH: 0
DIZZINESS: 0
COUGH: 0
LOSS OF CONSCIOUSNESS: 0
PALPITATIONS: 0
ORTHOPNEA: 0
MYALGIAS: 0
PND: 0

## 2023-11-03 ASSESSMENT — FIBROSIS 4 INDEX: FIB4 SCORE: 1.44

## 2023-11-03 NOTE — PROGRESS NOTES
Cardiology Initial Consultation Note    Date of note:    11/3/2023    Primary Care Provider: NED Garcias  Referring Provider: Katya Rose A.P.R*    Patient Name: CORDELL MARTINEZ   YOB: 1945  MRN:              [id number]    Chief Complaint   Patient presents with    New Patient     Dx: Nonrheumatic aortic valve stenosis       History of Present Illness: Ms. CORDELL MARTINEZ is a 78 year old woman with PMH significant for mild AS, hypertension, DM2, hyperlipidemia who presents to the office to establish care.    She had echocardiogram performed many years ago and noted to have mild AS. PCP ordered repeat echo to monitor for progression. Echocardiogram was performed back in August 2023.  She was noted to have EF of 70 to 75%.  No regional wall motion abnormalities.  Found to have no significant changes in aortic valve stenosis.  Transvalvular gradients consistent with mild AS--V-max 2.5 m/s, mean gradient 13.5 mmHg    Today, patient has no major cardiac complaints.  Tries to exercise every single day.  Her exercise routine includes walking several miles.  With these activities, she has not experienced exertional chest pain or dyspnea.  Denies having lower extremity edema, orthopnea.  No episodes of syncope.     Regarding her hypertension and hyperlipidemia, she has been maintained on low doses of lovastatin 10 mg daily and lisinopril.  Tolerating these medications without major side effects or intolerance.    Cardiovascular Risk Factors:  1. Smoking status: Denies  2. Type II Diabetes Mellitus: Yes   Lab Results   Component Value Date/Time    HBA1C 6.4 (A) 10/18/2023 11:12 AM    HBA1C 6.3 (A) 10/13/2022 11:05 AM     3. Hypertension: Yes  4. Dyslipidemia: Yes   Cholesterol,Tot   Date Value Ref Range Status   11/04/2022 226 (H) 100 - 199 mg/dL Final     LDL   Date Value Ref Range Status   11/04/2022 144 (H) <100 mg/dL Final     HDL   Date Value Ref Range Status   11/04/2022 63  >=40 mg/dL Final     Triglycerides   Date Value Ref Range Status   11/04/2022 97 0 - 149 mg/dL Final     5. Family history of early Coronary Artery Disease in a first degree relative (Male less than 55 years of age; Female less than 65 years of age): Denies      Review of Systems:  Review of Systems   Respiratory:  Negative for cough and shortness of breath.    Cardiovascular:  Negative for chest pain, palpitations, orthopnea, leg swelling and PND.   Musculoskeletal:  Negative for joint pain and myalgias.   Neurological:  Negative for dizziness and loss of consciousness.       Past Medical History:   Diagnosis Date    Diabetes (HCC)     Hearing loss     Heart murmur     Hyperlipidemia     Hypertension     Osteoporosis          Past Surgical History:   Procedure Laterality Date    PRIMARY C SECTION      3 c section    TONSILLECTOMY           Current Outpatient Medications   Medication Sig Dispense Refill    lovastatin (MEVACOR) 20 MG Tab Take 1 Tablet by mouth every evening. 90 Tablet 3    lisinopril (PRINIVIL) 10 MG Tab Take 1 Tablet by mouth every day. 90 Tablet 2    famotidine (PEPCID) 10 MG tablet       [employer] TRIPLE MAGNESIUM COMPLEX PO       Lactobacillus (PROBIOTIC ACIDOPHILUS PO)       Thiamine HCl (B-1 PO)       B Complex Vitamins (B COMPLEX B-12 PO)       Calcium Carbonate Antacid 648 MG Tab       metFORMIN ER (GLUCOPHAGE XR) 500 MG TABLET SR 24 HR Take 1 Tablet by mouth every day. 120 Tablet 0    Calcium Citrate-Vitamin D (CALCIUM + D PO) Take  by mouth.      aspirin 81 MG tablet Take 1 Tab by mouth every day. 100 Tab 3     No current facility-administered medications for this visit.         Allergies   Allergen Reactions    Latex Anaphylaxis         Family History   Problem Relation Age of Onset    Cancer Mother         breast cancer    Heart Disease Father 46        MI    Heart Attack Father     Heart Disease Sister         2 heart surgery    Diabetes Brother     Cancer Brother         brain tumor    Heart  Disease Brother         HEART STENT    Diabetes Brother     Diabetes Brother     Cancer Brother         anal         Social History     Socioeconomic History    Marital status:      Spouse name: Not on file    Number of children: Not on file    Years of education: Not on file    Highest education level: Some college, no degree   Occupational History    Not on file   Tobacco Use    Smoking status: Never    Smokeless tobacco: Never   Vaping Use    Vaping Use: Never used   Substance and Sexual Activity    Alcohol use: Not Currently     Alcohol/week: 1.2 oz     Types: 2 Glasses of wine per week    Drug use: Never    Sexual activity: Not Currently     Partners: Male     Comment:    Other Topics Concern    Back Care Not Asked    Bike Helmet Not Asked    Blood Transfusions Not Asked    Caffeine Concern Not Asked    Exercise Not Asked    Hobby Hazards Not Asked     Service Not Asked    Occupational Exposure Not Asked    Seat Belt Not Asked    Self-Exams Not Asked    Sleep Concern Not Asked    Special Diet Not Asked    Stress Concern Not Asked    Weight Concern Not Asked   Social History Narrative    Not on file     Social Determinants of Health     Financial Resource Strain: Low Risk  (10/15/2023)    Overall Financial Resource Strain (CARDIA)     Difficulty of Paying Living Expenses: Not very hard   Food Insecurity: No Food Insecurity (10/15/2023)    Hunger Vital Sign     Worried About Running Out of Food in the Last Year: Never true     Ran Out of Food in the Last Year: Never true   Transportation Needs: No Transportation Needs (10/15/2023)    PRAPARE - Transportation     Lack of Transportation (Medical): No     Lack of Transportation (Non-Medical): No   Physical Activity: Sufficiently Active (10/15/2023)    Exercise Vital Sign     Days of Exercise per Week: 7 days     Minutes of Exercise per Session: 30 min   Stress: No Stress Concern Present (10/15/2023)    [employer] -  "Occupational Stress Questionnaire     Feeling of Stress : Only a little   Social Connections: Unknown (10/15/2023)    Social Connection and Isolation Panel [NHANES]     Frequency of Communication with Friends and Family: Twice a week     Frequency of Social Gatherings with Friends and Family: Patient refused     Attends Confucianism Services: Patient refused     Active Member of Clubs or Organizations: No     Attends Club or Organization Meetings: Never     Marital Status:    Intimate Partner Violence: Not on file   Housing Stability: Low Risk  (10/15/2023)    Housing Stability Vital Sign     Unable to Pay for Housing in the Last Year: No     Number of Places Lived in the Last Year: 1     Unstable Housing in the Last Year: No         Physical Exam:  Ambulatory Vitals  /74 (BP Location: Left arm, Patient Position: Sitting, BP Cuff Size: Adult)   Pulse 74   Resp 12   Ht 1.6 m (5' 3\")   Wt 63 kg (139 lb)   SpO2 96%    Oxygen Therapy:  Pulse Oximetry: 96 %  BP Readings from Last 4 Encounters:   11/03/23 114/74   10/18/23 110/58   01/12/23 138/80   10/13/22 120/80       Weight/BMI: Body mass index is 24.62 kg/m².  Wt Readings from Last 4 Encounters:   11/03/23 63 kg (139 lb)   10/18/23 63.5 kg (140 lb)   01/12/23 62.1 kg (137 lb)   10/13/22 62.1 kg (137 lb)         General: Not in acute distress, appears comfortable  HEENT: OP clear   Neck:  No carotid bruits, No JVD appreciated  CVS:  RRR, Normal S1, S2. No murmurs, rubs or gallops  Resp: Normal respiratory effort, lungs CTA bilaterally. No rales or rhonchi  Abdomen: Soft, non-distended, non-tender to palpation, no guarding or rigidity  Skin: No obvious rashes, no cyanosis  Neurological: Alert and oriented x3, moves all extremities, no focal neurologic deficits  Psychiatric: Appropriate affect  Extremities:   Extremities warm, 2+ bilateral radial pulses.  2+ bilateral dp pulses, no lower extremity edema bilaterally      Lab Data Review:  Lab Results "   Component Value Date/Time    CHOLSTRLTOT 226 (H) 11/04/2022 08:50 AM     (H) 11/04/2022 08:50 AM    HDL 63 11/04/2022 08:50 AM    TRIGLYCERIDE 97 11/04/2022 08:50 AM       Lab Results   Component Value Date/Time    SODIUM 136 11/04/2022 08:50 AM    POTASSIUM 4.4 11/04/2022 08:50 AM    CHLORIDE 103 11/04/2022 08:50 AM    CO2 22 11/04/2022 08:50 AM    GLUCOSE 123 (H) 11/04/2022 08:50 AM    BUN 13 11/04/2022 08:50 AM    CREATININE 0.75 11/04/2022 08:50 AM     Lab Results   Component Value Date/Time    ALKPHOSPHAT 42 11/04/2022 08:50 AM    ASTSGOT 15 11/04/2022 08:50 AM    ALTSGPT 9 11/04/2022 08:50 AM    TBILIRUBIN 0.4 11/04/2022 08:50 AM      Lab Results   Component Value Date/Time    WBC 6.0 11/04/2022 08:50 AM    HEMOGLOBIN 14.4 11/04/2022 08:50 AM     Lab Results   Component Value Date/Time    HBA1C 6.4 (A) 10/18/2023 11:12 AM    HBA1C 6.3 (A) 10/13/2022 11:05 AM         Cardiac Imaging and Procedures Review:    EKG dated 11/3/2023:   My personal interpretation is sinus rhythm, nonspecific ST changes    Echo dated 8/2023:   The left ventricular ejection fraction is visually estimated to be   greater than 75%.  Normal regional wall motion.  Aortic valve sclerosis without significant stenosis.  Vmax is 2.55 m/s.  Estimated right ventricular systolic pressure is 20 mmHg.    Echo 04/2017:  No prior study is available for comparison.   Normal left ventricular chamber size. Normal left ventricular wall   thickness. Normal left ventricular systolic function. Left ventricular   ejection fraction is visually estimated to be 60%. Normal regional wall   motion. Grade I diastolic dysfunction.  Mildly calcified aortic valve leaflets. Mild aortic stenosis.   Transvalvular gradients are - Peak: 18 mmHg, Mean: 11 mmHg.   Dimensionless index is (0.50). No aortic insufficiency.    Stress test 02/2017:  Negative stress echocardiogram for ischemia with adequate stress   achieved by heart rate criteria.     Assessment & Plan      1. Nonrheumatic aortic (valve) stenosis  EKG      2. Primary hypertension        3. Hypercholesteremia  lovastatin (MEVACOR) 20 MG Tab    Lipid Profile      4. Type 2 diabetes mellitus without complication, without long-term current use of insulin (HCC)              Medical Decision Makin year old woman with PMH significant for mild AS, hypertension, DM2, hyperlipidemia who presents to the office to establish care.    1. Nonrheumatic aortic (valve) stenosis  -Review of the 2 last echocardiograms including 1 that was performed this year shows evidence of calcific aortic stenosis.  Transvalvular gradients are consistent with mild AAS.  She is currently asymptomatic and doing well from a cardiovascular standpoint.  We had an extensive discussion regarding the pathophysiology and natural history of aortic stenosis.  Explained that there is a possibility that her aortic stenosis severity may progress in the future.  However at this time it is only mild and does not require any acute interventions.  -She will require repeat echocardiogram in approximately 3 to 5 years to monitor for progression.    2. Primary hypertension  -Blood pressure is currently stable in office.  Continue with current dose of lisinopril 10 mg daily.    3. Hypercholesteremia  -Last lipid panel performed in  shows that her LDL is suboptimal.  Goal LDL should be less than 100.  She is only on low doses of lovastatin.  Given her concomitant diabetes mellitus, she will need aggressive cardiovascular risk factor modification.  We discussed switching to moderate intensity statin such as atorvastatin or rosuvastatin.  However at this time, she would prefer to increase current dose of lovastatin therapy.  This is certainly reasonable.  Increase lovastatin to 20 mg daily.  Repeat lipid panel in 3 months.  If still not at goal, can increase further to 40 mg.    It was a pleasure seeing Ms. CORDELL MARTINEZ in the office today. Return in about 1  year (around 11/3/2024). Patient is aware to call the cardiology clinic with any questions or concerns.      Robbie Aldridge MD, [facility]  Cardiologist, [facility] Medicine, [facility]  [address] [address]  Phone: [phone]  Fax: [phone]    Please note that this dictation was created using voice recognition software. I have made every reasonable attempt to correct obvious errors, but it is possible there are errors of grammar and possibly content that I did not discover before finalizing the note.

## 2023-11-04 ENCOUNTER — HOSPITAL ENCOUNTER (OUTPATIENT)
Dept: LAB | Facility: MEDICAL CENTER | Age: 78
End: 2023-11-04
Attending: NURSE PRACTITIONER
Payer: MEDICARE

## 2023-11-04 DIAGNOSIS — I10 PRIMARY HYPERTENSION: ICD-10-CM

## 2023-11-04 DIAGNOSIS — E78.00 HYPERCHOLESTEREMIA: ICD-10-CM

## 2023-11-04 DIAGNOSIS — E11.65 TYPE 2 DIABETES MELLITUS WITH HYPERGLYCEMIA, WITHOUT LONG-TERM CURRENT USE OF INSULIN (HCC): ICD-10-CM

## 2023-11-04 DIAGNOSIS — E55.9 VITAMIN D DEFICIENCY: ICD-10-CM

## 2023-11-04 LAB
25(OH)D3 SERPL-MCNC: 45 NG/ML (ref 30–100)
ALBUMIN SERPL BCP-MCNC: 4.4 G/DL (ref 3.2–4.9)
ALBUMIN/GLOB SERPL: 1.6 G/DL
ALP SERPL-CCNC: 47 U/L (ref 30–99)
ALT SERPL-CCNC: 10 U/L (ref 2–50)
ANION GAP SERPL CALC-SCNC: 10 MMOL/L (ref 7–16)
AST SERPL-CCNC: 13 U/L (ref 12–45)
BASOPHILS # BLD AUTO: 0.8 % (ref 0–1.8)
BASOPHILS # BLD: 0.05 K/UL (ref 0–0.12)
BILIRUB SERPL-MCNC: 0.4 MG/DL (ref 0.1–1.5)
BUN SERPL-MCNC: 13 MG/DL (ref 8–22)
CALCIUM ALBUM COR SERPL-MCNC: 9 MG/DL (ref 8.5–10.5)
CALCIUM SERPL-MCNC: 9.3 MG/DL (ref 8.5–10.5)
CHLORIDE SERPL-SCNC: 103 MMOL/L (ref 96–112)
CHOLEST SERPL-MCNC: 188 MG/DL (ref 100–199)
CO2 SERPL-SCNC: 23 MMOL/L (ref 20–33)
CREAT SERPL-MCNC: 0.66 MG/DL (ref 0.5–1.4)
CREAT UR-MCNC: 122.81 MG/DL
EOSINOPHIL # BLD AUTO: 0.16 K/UL (ref 0–0.51)
EOSINOPHIL NFR BLD: 2.6 % (ref 0–6.9)
ERYTHROCYTE [DISTWIDTH] IN BLOOD BY AUTOMATED COUNT: 40.7 FL (ref 35.9–50)
FASTING STATUS PATIENT QL REPORTED: NORMAL
GFR SERPLBLD CREATININE-BSD FMLA CKD-EPI: 91 ML/MIN/1.73 M 2
GLOBULIN SER CALC-MCNC: 2.8 G/DL (ref 1.9–3.5)
GLUCOSE SERPL-MCNC: 108 MG/DL (ref 65–99)
HCT VFR BLD AUTO: 39.5 % (ref 37–47)
HDLC SERPL-MCNC: 55 MG/DL
HGB BLD-MCNC: 13.6 G/DL (ref 12–16)
IMM GRANULOCYTES # BLD AUTO: 0.01 K/UL (ref 0–0.11)
IMM GRANULOCYTES NFR BLD AUTO: 0.2 % (ref 0–0.9)
LDLC SERPL CALC-MCNC: 111 MG/DL
LYMPHOCYTES # BLD AUTO: 2.25 K/UL (ref 1–4.8)
LYMPHOCYTES NFR BLD: 37.1 % (ref 22–41)
MCH RBC QN AUTO: 30.7 PG (ref 27–33)
MCHC RBC AUTO-ENTMCNC: 34.4 G/DL (ref 32.2–35.5)
MCV RBC AUTO: 89.2 FL (ref 81.4–97.8)
MICROALBUMIN UR-MCNC: 2 MG/DL
MICROALBUMIN/CREAT UR: 16 MG/G (ref 0–30)
MONOCYTES # BLD AUTO: 0.5 K/UL (ref 0–0.85)
MONOCYTES NFR BLD AUTO: 8.2 % (ref 0–13.4)
NEUTROPHILS # BLD AUTO: 3.1 K/UL (ref 1.82–7.42)
NEUTROPHILS NFR BLD: 51.1 % (ref 44–72)
NRBC # BLD AUTO: 0 K/UL
NRBC BLD-RTO: 0 /100 WBC (ref 0–0.2)
PLATELET # BLD AUTO: 258 K/UL (ref 164–446)
PMV BLD AUTO: 8.2 FL (ref 9–12.9)
POTASSIUM SERPL-SCNC: 4.3 MMOL/L (ref 3.6–5.5)
PROT SERPL-MCNC: 7.2 G/DL (ref 6–8.2)
RBC # BLD AUTO: 4.43 M/UL (ref 4.2–5.4)
SODIUM SERPL-SCNC: 136 MMOL/L (ref 135–145)
TRIGL SERPL-MCNC: 109 MG/DL (ref 0–149)
TSH SERPL DL<=0.005 MIU/L-ACNC: 1.53 UIU/ML (ref 0.38–5.33)
WBC # BLD AUTO: 6.1 K/UL (ref 4.8–10.8)

## 2023-11-04 PROCEDURE — 82043 UR ALBUMIN QUANTITATIVE: CPT

## 2023-11-04 PROCEDURE — 80053 COMPREHEN METABOLIC PANEL: CPT

## 2023-11-04 PROCEDURE — 85025 COMPLETE CBC W/AUTO DIFF WBC: CPT

## 2023-11-04 PROCEDURE — 82570 ASSAY OF URINE CREATININE: CPT

## 2023-11-04 PROCEDURE — 36415 COLL VENOUS BLD VENIPUNCTURE: CPT

## 2023-11-04 PROCEDURE — 80061 LIPID PANEL: CPT

## 2023-11-04 PROCEDURE — 82306 VITAMIN D 25 HYDROXY: CPT

## 2023-11-04 PROCEDURE — 84443 ASSAY THYROID STIM HORMONE: CPT

## 2023-11-09 ENCOUNTER — TELEPHONE (OUTPATIENT)
Dept: CARDIOLOGY | Facility: MEDICAL CENTER | Age: 78
End: 2023-11-09
Payer: MEDICARE

## 2023-11-09 DIAGNOSIS — E78.00 HYPERCHOLESTEREMIA: ICD-10-CM

## 2023-11-09 NOTE — TELEPHONE ENCOUNTER
Phone Number Called: [phone]    Call outcome: Spoke to patient regarding message below.    Message: Called to inform patient of MK recommendations. Patient verbalized understanding. No further questions at this time.     ----- Message from Annemarie Oseguera R.N. sent at 11/8/2023 10:28 AM PST -----    ----- Message -----  From: Robbie LO M.D.  Sent: 11/8/2023   8:28 AM PST  To: Kalpana Higgins R.N.    Statin therapy was increased during her last visit on 11/3. She was suppose to get repeat lipid panel in 2-3 months rather than a few days after her OV with me. Continue current dose for now and repeat lipid panel in 2 months to reassess.    GISSEL

## 2023-11-29 ENCOUNTER — PATIENT MESSAGE (OUTPATIENT)
Dept: HEALTH INFORMATION MANAGEMENT | Facility: OTHER | Age: 78
End: 2023-11-29

## 2023-12-07 DIAGNOSIS — E78.00 HYPERCHOLESTEREMIA: ICD-10-CM

## 2023-12-08 RX ORDER — LOVASTATIN 20 MG/1
20 TABLET ORAL NIGHTLY
Qty: 90 TABLET | Refills: 3 | OUTPATIENT
Start: 2023-12-08

## 2024-02-08 ENCOUNTER — TELEPHONE (OUTPATIENT)
Dept: HEALTH INFORMATION MANAGEMENT | Facility: OTHER | Age: 79
End: 2024-02-08
Payer: MEDICARE

## 2024-02-16 DIAGNOSIS — E11.9 TYPE 2 DIABETES MELLITUS WITHOUT COMPLICATION, WITHOUT LONG-TERM CURRENT USE OF INSULIN (HCC): ICD-10-CM

## 2024-02-17 NOTE — TELEPHONE ENCOUNTER
Received request via: Pharmacy    Was the patient seen in the last year in this department? Yes    Does the patient have an active prescription (recently filled or refills available) for medication(s) requested? No    Pharmacy Name: [facility]    Does the patient have halfway Plus and need 100 day supply (blood pressure, diabetes and cholesterol meds only)? updated

## 2024-02-20 RX ORDER — METFORMIN HYDROCHLORIDE 500 MG/1
500 TABLET, EXTENDED RELEASE ORAL DAILY
Qty: 100 TABLET | Refills: 1 | Status: SHIPPED | OUTPATIENT
Start: 2024-02-20

## 2024-03-20 NOTE — ASSESSMENT & PLAN NOTE
Chronic, stable. A1c today 6.9, up from A1c 6.4 in 2023. Last monofilament foot exam: 10/2022, last urine microalb/creat: 2023, last retinal screenin2023. Maintains on metformin 500mg daily. She has noticed some intermittent tingling in her feet without numbness. Encouraged annual foot exams. Continue to advise low carbohydrate diet with regular physical activity. Continue current treatment regime. Follow up with PCP as scheduled for continued monitoring and management.

## 2024-03-20 NOTE — ASSESSMENT & PLAN NOTE
Chronic, stable. BP today 122/62. Pt does not monitor BP at home. Denies dizziness/lightheadedness, chest pain, dyspnea. Continue current treatment regime: lisinopril 10mg daily . Follow up with PCP annually for continued monitoring and management.

## 2024-03-20 NOTE — ASSESSMENT & PLAN NOTE
Chronic, stable. Maintains on statin therapy without issue though she recently stopped this one month ago due to things she had read online. Most recent lipid panel from 11/2023 with LDL at 111. Encouraged pt to restart lovastatin 20mg daily given increased CVD risk in the setting of T2DM. Recommend Mediterranean diet and regular physical activity. Follow up with PCP at least annually for continued monitoring and management.   Lab Results   Component Value Date/Time    CHOLSTRLTOT 188 11/04/2023 10:00 AM     (H) 11/04/2023 10:00 AM    HDL 55 11/04/2023 10:00 AM    TRIGLYCERIDE 109 11/04/2023 10:00 AM

## 2024-03-20 NOTE — ASSESSMENT & PLAN NOTE
Chronic, stable. Last DEXA 6/2022 with osteoporosis of the lumbar spine and osteopenia of the proximal left femur with T scores of -2.6 and -1.8 respectively. Denies hx of fragility fracture. Pt has attempted alendronate in the past but had no benefit from this. She is starting Prolia next month. Advise calcium and vitamin D supplementation with weightbearing exercises as tolerated. Follow up with PCP at least annually for continued monitoring and management.

## 2024-03-21 ENCOUNTER — OFFICE VISIT (OUTPATIENT)
Dept: FAMILY PLANNING/WOMEN'S HEALTH CLINIC | Facility: PHYSICIAN GROUP | Age: 79
End: 2024-03-21
Payer: MEDICARE

## 2024-03-21 VITALS
WEIGHT: 145 LBS | DIASTOLIC BLOOD PRESSURE: 62 MMHG | SYSTOLIC BLOOD PRESSURE: 122 MMHG | BODY MASS INDEX: 25.69 KG/M2 | HEIGHT: 63 IN

## 2024-03-21 DIAGNOSIS — N39.46 MIXED STRESS AND URGE URINARY INCONTINENCE: ICD-10-CM

## 2024-03-21 DIAGNOSIS — M81.0 AGE-RELATED OSTEOPOROSIS WITHOUT CURRENT PATHOLOGICAL FRACTURE: ICD-10-CM

## 2024-03-21 DIAGNOSIS — E11.65 TYPE 2 DIABETES MELLITUS WITH HYPERGLYCEMIA, WITHOUT LONG-TERM CURRENT USE OF INSULIN (HCC): ICD-10-CM

## 2024-03-21 DIAGNOSIS — I10 PRIMARY HYPERTENSION: ICD-10-CM

## 2024-03-21 DIAGNOSIS — E78.00 HYPERCHOLESTEREMIA: ICD-10-CM

## 2024-03-21 LAB
HBA1C MFR BLD: 6.9 % (ref ?–5.8)
POCT INT CON NEG: NEGATIVE
POCT INT CON POS: POSITIVE

## 2024-03-21 PROCEDURE — 3078F DIAST BP <80 MM HG: CPT | Performed by: PHYSICIAN ASSISTANT

## 2024-03-21 PROCEDURE — 3074F SYST BP LT 130 MM HG: CPT | Performed by: PHYSICIAN ASSISTANT

## 2024-03-21 PROCEDURE — 1126F AMNT PAIN NOTED NONE PRSNT: CPT | Performed by: PHYSICIAN ASSISTANT

## 2024-03-21 PROCEDURE — G0439 PPPS, SUBSEQ VISIT: HCPCS | Performed by: PHYSICIAN ASSISTANT

## 2024-03-21 PROCEDURE — 83036 HEMOGLOBIN GLYCOSYLATED A1C: CPT | Performed by: PHYSICIAN ASSISTANT

## 2024-03-21 SDOH — ECONOMIC STABILITY: FOOD INSECURITY: WITHIN THE PAST 12 MONTHS, THE FOOD YOU BOUGHT JUST DIDN'T LAST AND YOU DIDN'T HAVE MONEY TO GET MORE.: NEVER TRUE

## 2024-03-21 SDOH — ECONOMIC STABILITY: INCOME INSECURITY: HOW HARD IS IT FOR YOU TO PAY FOR THE VERY BASICS LIKE FOOD, HOUSING, MEDICAL CARE, AND HEATING?: NOT HARD AT ALL

## 2024-03-21 SDOH — ECONOMIC STABILITY: FOOD INSECURITY: WITHIN THE PAST 12 MONTHS, YOU WORRIED THAT YOUR FOOD WOULD RUN OUT BEFORE YOU GOT MONEY TO BUY MORE.: NEVER TRUE

## 2024-03-21 ASSESSMENT — ACTIVITIES OF DAILY LIVING (ADL): BATHING_REQUIRES_ASSISTANCE: 0

## 2024-03-21 ASSESSMENT — PAIN SCALES - GENERAL: PAINLEVEL: NO PAIN

## 2024-03-21 ASSESSMENT — FIBROSIS 4 INDEX: FIB4 SCORE: 1.21

## 2024-03-21 ASSESSMENT — PATIENT HEALTH QUESTIONNAIRE - PHQ9: CLINICAL INTERPRETATION OF PHQ2 SCORE: 0

## 2024-03-21 ASSESSMENT — ENCOUNTER SYMPTOMS: GENERAL WELL-BEING: GOOD

## 2024-03-21 NOTE — ASSESSMENT & PLAN NOTE
Chronic, worsening. Pt reporting frequent urinary incontinence. She has attempted Kegels without impact. She has previously attempted medication but she felt this dried her out too much. She wears pads presently. Advised pt to revisit with PCP for further evaluation and management.

## 2024-03-21 NOTE — PROGRESS NOTES
Comprehensive Health Assessment Program     Kacey Whelan is a 78 y.o. here for her comprehensive health assessment.    Patient Active Problem List    Diagnosis Date Noted    Dysuria 10/18/2023    Mixed stress and urge urinary incontinence 10/02/2019    Age-related osteoporosis without current pathological fracture 06/26/2017    Vitamin D deficiency 03/30/2017    Type 2 diabetes mellitus with hyperglycemia, without long-term current use of insulin (HCC) 10/14/2016    Hypercholesteremia 10/14/2016    HTN (hypertension) 03/06/2015    Aortic stenosis 03/06/2015    Hearing loss 03/06/2015       Current Outpatient Medications   Medication Sig Dispense Refill    metFORMIN ER (GLUCOPHAGE XR) 500 MG TABLET SR 24 HR Take 1 tablet by mouth once daily 100 Tablet 1    lisinopril (PRINIVIL) 10 MG Tab Take 1 Tablet by mouth every day. 90 Tablet 2    Lactobacillus (PROBIOTIC ACIDOPHILUS PO)       Thiamine HCl (B-1 PO)       B Complex Vitamins (B COMPLEX B-12 PO)       Calcium Carbonate Antacid 648 MG Tab       Calcium Citrate-Vitamin D (CALCIUM + D PO) Take  by mouth.      aspirin 81 MG tablet Take 1 Tab by mouth every day. 100 Tab 3    lovastatin (MEVACOR) 20 MG Tab Take 1 Tablet by mouth every evening. (Patient not taking: Reported on 3/21/2024) 90 Tablet 3    famotidine (PEPCID) 10 MG tablet  (Patient not taking: Reported on 3/21/2024)      [employer] TRIPLE MAGNESIUM COMPLEX PO  (Patient not taking: Reported on 3/21/2024)       No current facility-administered medications for this visit.          Current supplements as per medication list.     Allergies:   Latex  Social History     Tobacco Use    Smoking status: Never    Smokeless tobacco: Never   Vaping Use    Vaping Use: Never used   Substance Use Topics    Alcohol use: Not Currently    Drug use: Never     Family History   Problem Relation Age of Onset    Cancer Mother         breast cancer    Heart Disease Father 46        MI    Heart Attack Father     Heart Disease  Sister         2 heart surgery    Diabetes Brother     Cancer Brother         brain tumor    Heart Disease Brother         HEART STENT    Diabetes Brother     Diabetes Brother     Cancer Brother         yonas Erazo  has a past medical history of Diabetes (HCC), Hearing loss, Heart murmur, Hyperlipidemia, Hypertension, and Osteoporosis.   Past Surgical History:   Procedure Laterality Date    PRIMARY C SECTION      3 c section    TONSILLECTOMY         Screening:  In the last six months have you experienced any leakage of urine? Yes    Depression Screening  Little interest or pleasure in doing things?  0 - not at all  Feeling down, depressed , or hopeless? 0 - not at all  Patient Health Questionnaire Score: 0     If depressive symptoms identified deferred to follow up visit unless specifically addressed in assessment and plan.    Interpretation of PHQ-9 Total Score   Score Severity   1-4 No Depression   5-9 Mild Depression   10-14 Moderate Depression   15-19 Moderately Severe Depression   20-27 Severe Depression    Screening for Cognitive Impairment  Do you or any of your friends or family members have any concern about your memory? Yes  Three Minute Recall (Leader, Season, Table) 2/3    Tonny clock face with all 12 numbers and set the hands to show 10 minutes after 11.  Yes 5  Cognitive concerns identified deferred for follow up unless specifically addressed in assessment and plan.    Fall Risk Assessment  Has the patient had two or more falls in the last year or any fall with injury in the last year?  No    Safety Assessment  Do you always wear your seatbelt?  Yes  Any changes to home needed to function safely? No  Difficulty hearing.  Yes  Patient counseled about all safety risks that were identified.    Functional Assessment ADLs  Are there any barriers preventing you from cooking for yourself or meeting nutritional needs?  No.    Are there any barriers preventing you from driving safely or obtaining  transportation?  Yes. Patient does not currently drives she takes [employer]  Are there any barriers preventing you from using a telephone or calling for help?  No    Are there any barriers preventing you from shopping?  No.    Are there any barriers preventing you from taking care of your own finances?  No    Are there any barriers preventing you from managing your medications?  No    Are there any barriers preventing you from showering, bathing or dressing yourself? No    Are there any barriers preventing you from doing housework or laundry? No  Are there any barriers preventing you from using the toilet?No  Are you currently engaging in any exercise or physical activity?  Yes. Walking everyday     Self-Assessment of Health  What is your perception of your health? Good  Do you sleep more than six hours a night? Yes  In the past 7 days, how much did pain keep you from doing your normal work? None  Do you spend quality time with family or friends (virtually or in person)? Yes  Do you usually eat a heart healthy diet that constists of a variety of fruits, vegetables, whole grains and fiber? Yes  Do you eat foods high in fat and/or Fast Food more than three times per week? No    Advance Care Planning  Do you have an Advance Directive, Living Will, Durable Power of , or POLST? No                 Health Maintenance Summary            Overdue - Zoster (Shingles) Vaccines (1 of 2) Overdue since 11/9/2014 09/14/2014  Imm Admin: Varicella Vaccine Live    10/15/2013  Imm Admin: Varicella Vaccine Live    09/02/2011  Imm Admin: Varicella Vaccine Live              Overdue - Diabetes: Monofilament / LE Exam (Yearly) Overdue since 10/13/2023      10/13/2022  Diabetic Monofilament Lower Extremity Exam    10/13/2022  [employer]: WORKFLOW - DIABETES - DIABETIC FOOT EXAM PERFORMED    10/07/2020  Smart[employer]: WORKFLOW - DIABETES - DIABETIC FOOT EXAM PERFORMED    05/22/2018  Diabetic Monofilament Lower Extremity Exam    12/27/2016   Diabetic Monofilament Lower Extremity Exam    Only the first 5 history entries have been loaded, but more history exists.              Overdue - Hepatitis B Vaccine (Hep B) (2 of 3 - 19+ 3-dose series) Overdue since 11/15/2023      10/18/2023  Imm Admin: Hepatitis B Vaccine (Adol/Adult)              Postponed - COVID-19 Vaccine (3 - 2023-24 season) Postponed until 3/21/2025      04/21/2021  Imm Admin: MODERNA SARS-COV-2 VACCINE (12+)    02/26/2021  Imm Admin: MODERNA SARS-COV-2 VACCINE (12+)              Diabetes: Retinopathy Screening (Every 6 Months) Next due on 5/14/2024 11/14/2023  AMB EXTERNAL RETINAL SCREENING RESULTS    08/01/2022  REFERRAL FOR RETINAL SCREENING EXAM    10/24/2018  REFERRAL FOR RETINAL SCREENING EXAM    11/30/2016  REFERRAL FOR RETINAL SCREENING EXAM              Bone Density Scan (Every 2 Years) Next due on 6/16/2024 06/16/2022  DS-BONE DENSITY STUDY (DEXA)    10/08/2019  DS-BONE DENSITY STUDY (DEXA)    02/14/2017  DS-BONE DENSITY STUDY (DEXA)              A1c Screening (Every 6 Months) Tentatively due on 9/21/2024 03/21/2024  POCT Hemoglobin A1C    10/18/2023  POCT Hemoglobin A1C    10/13/2022  POCT Hemoglobin A1C    10/05/2021  HEMOGLOBIN A1C    10/20/2020  HEMOGLOBIN A1C    Only the first 5 history entries have been loaded, but more history exists.              Ordered - Fasting Lipid Profile (Yearly) Ordered on 11/9/2023 11/04/2023  Lipid Profile    11/04/2022  Lipid Profile    10/05/2021  Lipid Profile    10/20/2020  Lipid Profile    09/24/2019  Lipid Profile    Only the first 5 history entries have been loaded, but more history exists.              Diabetes: Urine Protein Screening (Yearly) Next due on 11/4/2024 11/04/2023  MICROALBUMIN CREAT RATIO URINE    11/04/2022  MICROALBUMIN CREAT RATIO URINE    10/05/2021  MICROALBUMIN CREAT RATIO URINE    10/20/2020  MICROALBUMIN CREAT RATIO URINE    09/24/2019  MICROALBUMIN CREAT RATIO URINE    Only the first 5  history entries have been loaded, but more history exists.              SERUM CREATININE (Yearly) Next due on 11/4/2024 11/04/2023  Comp Metabolic Panel    11/04/2022  Comp Metabolic Panel    10/05/2021  Comp Metabolic Panel    10/20/2020  Comp Metabolic Panel    09/24/2019  Comp Metabolic Panel    Only the first 5 history entries have been loaded, but more history exists.              Annual Wellness Visit (Yearly) Next due on 3/21/2025      03/21/2024  Level of Service: [place] ANNUAL WELLNESS VISIT-INCLUDES PPPS SUBSEQUE*    10/18/2023  Visit Dx: Medicare annual wellness visit, subsequent    10/13/2022  Visit Dx: Medicare annual wellness visit, subsequent    10/13/2021  Visit Dx: Medicare annual wellness visit, subsequent    10/07/2020  Subsequent Annual Wellness Visit - Includes PPPS ()    Only the first 5 history entries have been loaded, but more history exists.              IMM DTaP/Tdap/Td Vaccine (2 - Td or Tdap) Next due on 10/18/2033      10/18/2023  Imm Admin: Tdap Vaccine              Pneumococcal Vaccine: 65+ Years (Series Information) Completed      03/30/2017  Imm Admin: Pneumococcal Conjugate Vaccine (Prevnar/PCV-13)    09/05/2016  Imm Admin: Pneumococcal polysaccharide vaccine (PPSV-23)              Hepatitis C Screening  Tentatively Complete      10/20/2020  Hepatitis C Antibody component of HEP C VIRUS ANTIBODY              Influenza Vaccine (Series Information) Completed      08/23/2023  Imm Admin: Influenza Vaccine Adult HD    08/24/2022  Imm Admin: Influenza Vaccine Adult HD    09/26/2020  Imm Admin: Influenza Vaccine Adult HD    09/03/2019  Imm Admin: Influenza Vaccine Adult HD    10/01/2018  Imm Admin: Influenza Vaccine Adult HD    Only the first 5 history entries have been loaded, but more history exists.              Hepatitis A Vaccine (Hep A) (Series Information) Aged Out      No completion history exists for this topic.              HPV Vaccines (Series Information) Aged Out       "No completion history exists for this topic.              Polio Vaccine (Inactivated Polio) (Series Information) Aged Out      No completion history exists for this topic.              Meningococcal Immunization (Series Information) Aged Out      No completion history exists for this topic.              Discontinued - Mammogram  Discontinued        Frequency changed to Never automatically (Topic No Longer Applies)    06/16/2022  MA-SCREENING MAMMO BILAT W/TOMOSYNTHESIS W/CAD    05/03/2019  MA-SCREENING MAMMO BILAT W/TOMOSYNTHESIS W/CAD    02/14/2017  MA-MAMMO SCREENING BILAT W/ABIMBOLA W/CAD                    Patient Care Team:  NED Garcias as PCP - General (Family Medicine)  Hi Medina M.D. (Inactive) as Consulting Physician (Ophthalmology)  Zaire Brown M.D. (Otolaryngology)      Financial Resource Strain: Low Risk  (3/21/2024)    Overall Financial Resource Strain (CARDIA)     Difficulty of Paying Living Expenses: Not hard at all      Transportation Needs: No Transportation Needs (3/21/2024)    PRAPARE - Transportation     Lack of Transportation (Medical): No     Lack of Transportation (Non-Medical): No      Food Insecurity: No Food Insecurity (3/21/2024)    Hunger Vital Sign     Worried About Running Out of Food in the Last Year: Never true     Ran Out of Food in the Last Year: Never true        Encounter Vitals  Blood Pressure : 122/62  Weight: 65.8 kg (145 lb)  Height: 160 cm (5' 2.99\")  BMI (Calculated): 25.69  Pain Score: No pain     Alert, oriented in no acute distress.  Eye contact is good, speech goal directed, affect calm.    Assessment and Plan. The following treatment and monitoring plan is recommended:  Hypercholesteremia  Chronic, stable. Maintains on statin therapy without issue though she recently stopped this one month ago due to things she had read online. Most recent lipid panel from 11/2023 with LDL at 111. Encouraged pt to restart lovastatin 20mg daily given increased CVD " risk in the setting of T2DM. Recommend Mediterranean diet and regular physical activity. Follow up with PCP at least annually for continued monitoring and management.   Lab Results   Component Value Date/Time    CHOLSTRLTOT 188 2023 10:00 AM     (H) 2023 10:00 AM    HDL 55 2023 10:00 AM    TRIGLYCERIDE 109 2023 10:00 AM         HTN (hypertension)  Chronic, stable. BP today 122/62. Pt does not monitor BP at home. Denies dizziness/lightheadedness, chest pain, dyspnea. Continue current treatment regime: lisinopril 10mg daily . Follow up with PCP annually for continued monitoring and management.     Age-related osteoporosis without current pathological fracture  Chronic, stable. Last DEXA 2022 with osteoporosis of the lumbar spine and osteopenia of the proximal left femur with T scores of -2.6 and -1.8 respectively. Denies hx of fragility fracture. Pt has attempted alendronate in the past but had no benefit from this. She is starting Prolia next month. Advise calcium and vitamin D supplementation with weightbearing exercises as tolerated. Follow up with PCP at least annually for continued monitoring and management.    Type 2 diabetes mellitus with hyperglycemia, without long-term current use of insulin (HCC)  Chronic, stable. A1c today 6.9, up from A1c 6.4 in 2023. Last monofilament foot exam: 10/2022, last urine microalb/creat: 2023, last retinal screenin2023. Maintains on metformin 500mg daily. She has noticed some intermittent tingling in her feet without numbness. Encouraged annual foot exams. Continue to advise low carbohydrate diet with regular physical activity. Continue current treatment regime. Follow up with PCP as scheduled for continued monitoring and management.    Mixed stress and urge urinary incontinence  Chronic, worsening. Pt reporting frequent urinary incontinence. She has attempted Kegels without impact. She has previously attempted medication but she felt  this dried her out too much. She wears pads presently. Advised pt to revisit with PCP for further evaluation and management.    Services suggested: No services needed at this time  Health Care Screening: Age-appropriate preventive services recommended by USPTF and ACIP covered by Medicare were discussed today. Services ordered if indicated and agreed upon by the patient.  Referrals offered: Community-based lifestyle interventions to reduce health risks and promote self-management and wellness, fall prevention, nutrition, physical activity, tobacco-use cessation, weight loss, and mental health services as per orders if indicated.    Discussion today about general wellness and lifestyle habits:    Prevent falls and reduce trip hazards; Cautioned about securing or removing rugs.  Have a working fire alarm and carbon monoxide detector.  Engage in regular physical activity and social activities.    Follow-up: Return for follow up visit with PCP as previously scheduled.

## 2024-04-04 ENCOUNTER — APPOINTMENT (OUTPATIENT)
Dept: ONCOLOGY | Facility: MEDICAL CENTER | Age: 79
End: 2024-04-04
Attending: NURSE PRACTITIONER
Payer: MEDICARE

## 2024-06-21 ENCOUNTER — HOSPITAL ENCOUNTER (OUTPATIENT)
Dept: LAB | Facility: MEDICAL CENTER | Age: 79
End: 2024-06-21
Attending: INTERNAL MEDICINE
Payer: MEDICARE

## 2024-06-21 DIAGNOSIS — E78.00 HYPERCHOLESTEREMIA: ICD-10-CM

## 2024-06-21 LAB
CHOLEST SERPL-MCNC: 238 MG/DL (ref 100–199)
FASTING STATUS PATIENT QL REPORTED: NORMAL
HDLC SERPL-MCNC: 52 MG/DL
LDLC SERPL CALC-MCNC: 159 MG/DL
TRIGL SERPL-MCNC: 135 MG/DL (ref 0–149)

## 2024-06-21 PROCEDURE — 80061 LIPID PANEL: CPT

## 2024-06-21 PROCEDURE — 36415 COLL VENOUS BLD VENIPUNCTURE: CPT

## 2024-06-24 DIAGNOSIS — E78.00 HYPERCHOLESTEREMIA: ICD-10-CM

## 2024-06-24 RX ORDER — LOVASTATIN 20 MG/1
40 TABLET ORAL NIGHTLY
Qty: 90 TABLET | Refills: 3
Start: 2024-06-24 | End: 2024-06-25 | Stop reason: SDUPTHER

## 2024-06-25 DIAGNOSIS — E78.00 HYPERCHOLESTEREMIA: ICD-10-CM

## 2024-06-25 DIAGNOSIS — E11.9 TYPE 2 DIABETES MELLITUS WITHOUT COMPLICATION, WITHOUT LONG-TERM CURRENT USE OF INSULIN (HCC): ICD-10-CM

## 2024-06-25 RX ORDER — LOVASTATIN 20 MG/1
40 TABLET ORAL NIGHTLY
Qty: 200 TABLET | Refills: 1 | Status: SHIPPED | OUTPATIENT
Start: 2024-06-25

## 2024-06-25 NOTE — TELEPHONE ENCOUNTER
Received request via: Patient    Was the patient seen in the last year in this department? Yes    Does the patient have an active prescription (recently filled or refills available) for medication(s) requested? No    Pharmacy Name: [facility]     Does the patient have snf Plus and need 100 day supply (blood pressure, diabetes and cholesterol meds only)? Patient does not have SCP

## 2024-06-25 NOTE — TELEPHONE ENCOUNTER
Is the patient due for a refill? Yes    Was the patient seen the past year? Yes    Date of last office visit: 11/03/2023    Does the patient have an upcoming appointment?  No     Provider to refill:GISSEL    Does the patients insurance require a 100 day supply?  Yes

## 2024-06-25 NOTE — TELEPHONE ENCOUNTER
Received request via: Pharmacy    Was the patient seen in the last year in this department? Yes    Does the patient have an active prescription (recently filled or refills available) for medication(s) requested?  Yes

## 2024-06-26 RX ORDER — METFORMIN HYDROCHLORIDE 500 MG/1
500 TABLET, EXTENDED RELEASE ORAL DAILY
Qty: 120 TABLET | Refills: 1 | Status: SHIPPED | OUTPATIENT
Start: 2024-06-26

## 2024-06-26 RX ORDER — LISINOPRIL 10 MG/1
10 TABLET ORAL DAILY
Qty: 90 TABLET | Refills: 0 | Status: SHIPPED | OUTPATIENT
Start: 2024-06-26

## 2024-06-28 ENCOUNTER — APPOINTMENT (OUTPATIENT)
Dept: ADMISSIONS | Facility: MEDICAL CENTER | Age: 79
End: 2024-06-28
Attending: OPHTHALMOLOGY
Payer: MEDICARE

## 2024-07-05 ENCOUNTER — APPOINTMENT (OUTPATIENT)
Dept: ADMISSIONS | Facility: MEDICAL CENTER | Age: 79
End: 2024-07-05
Attending: OPHTHALMOLOGY
Payer: MEDICARE

## 2024-07-05 RX ORDER — CHOLECALCIFEROL (VITAMIN D3) 50 MCG
2000 TABLET ORAL DAILY
COMMUNITY

## 2024-07-05 RX ORDER — IBUPROFEN 200 MG
200 TABLET ORAL EVERY 6 HOURS PRN
COMMUNITY

## 2024-07-05 RX ORDER — OMEPRAZOLE 20 MG/1
20 CAPSULE, DELAYED RELEASE ORAL DAILY
COMMUNITY

## 2024-07-08 ENCOUNTER — PRE-ADMISSION TESTING (OUTPATIENT)
Dept: ADMISSIONS | Facility: MEDICAL CENTER | Age: 79
End: 2024-07-08
Attending: OPHTHALMOLOGY
Payer: MEDICARE

## 2024-07-08 DIAGNOSIS — Z01.812 PRE-OPERATIVE LABORATORY EXAMINATION: ICD-10-CM

## 2024-07-08 DIAGNOSIS — Z01.810 PRE-OPERATIVE CARDIOVASCULAR EXAMINATION: ICD-10-CM

## 2024-07-08 LAB
ANION GAP SERPL CALC-SCNC: 10 MMOL/L (ref 7–16)
BUN SERPL-MCNC: 14 MG/DL (ref 8–22)
CALCIUM SERPL-MCNC: 9.5 MG/DL (ref 8.5–10.5)
CHLORIDE SERPL-SCNC: 103 MMOL/L (ref 96–112)
CO2 SERPL-SCNC: 24 MMOL/L (ref 20–33)
CREAT SERPL-MCNC: 0.74 MG/DL (ref 0.5–1.4)
EKG IMPRESSION: NORMAL
GFR SERPLBLD CREATININE-BSD FMLA CKD-EPI: 83 ML/MIN/1.73 M 2
GLUCOSE SERPL-MCNC: 136 MG/DL (ref 65–99)
POTASSIUM SERPL-SCNC: 4.8 MMOL/L (ref 3.6–5.5)
SODIUM SERPL-SCNC: 137 MMOL/L (ref 135–145)

## 2024-07-08 PROCEDURE — 93010 ELECTROCARDIOGRAM REPORT: CPT | Performed by: INTERNAL MEDICINE

## 2024-07-08 PROCEDURE — 80048 BASIC METABOLIC PNL TOTAL CA: CPT

## 2024-07-08 PROCEDURE — 36415 COLL VENOUS BLD VENIPUNCTURE: CPT

## 2024-07-08 PROCEDURE — 93005 ELECTROCARDIOGRAM TRACING: CPT

## 2024-07-10 ENCOUNTER — HOSPITAL ENCOUNTER (OUTPATIENT)
Facility: MEDICAL CENTER | Age: 79
End: 2024-07-10
Attending: OPHTHALMOLOGY | Admitting: OPHTHALMOLOGY
Payer: MEDICARE

## 2024-07-10 ENCOUNTER — ANESTHESIA EVENT (OUTPATIENT)
Dept: SURGERY | Facility: MEDICAL CENTER | Age: 79
End: 2024-07-10
Payer: MEDICARE

## 2024-07-10 ENCOUNTER — ANESTHESIA (OUTPATIENT)
Dept: SURGERY | Facility: MEDICAL CENTER | Age: 79
End: 2024-07-10
Payer: MEDICARE

## 2024-07-10 VITALS
OXYGEN SATURATION: 92 % | HEIGHT: 63 IN | SYSTOLIC BLOOD PRESSURE: 159 MMHG | RESPIRATION RATE: 16 BRPM | HEART RATE: 60 BPM | DIASTOLIC BLOOD PRESSURE: 73 MMHG | WEIGHT: 143.08 LBS | TEMPERATURE: 97 F | BODY MASS INDEX: 25.35 KG/M2

## 2024-07-10 LAB — GLUCOSE BLD STRIP.AUTO-MCNC: 103 MG/DL (ref 65–99)

## 2024-07-10 PROCEDURE — 160035 HCHG PACU - 1ST 60 MINS PHASE I: Performed by: OPHTHALMOLOGY

## 2024-07-10 PROCEDURE — 160048 HCHG OR STATISTICAL LEVEL 1-5: Performed by: OPHTHALMOLOGY

## 2024-07-10 PROCEDURE — 160009 HCHG ANES TIME/MIN: Performed by: OPHTHALMOLOGY

## 2024-07-10 PROCEDURE — 700111 HCHG RX REV CODE 636 W/ 250 OVERRIDE (IP): Performed by: OPHTHALMOLOGY

## 2024-07-10 PROCEDURE — 160041 HCHG SURGERY MINUTES - EA ADDL 1 MIN LEVEL 4: Performed by: OPHTHALMOLOGY

## 2024-07-10 PROCEDURE — 700101 HCHG RX REV CODE 250: Performed by: OPHTHALMOLOGY

## 2024-07-10 PROCEDURE — 700101 HCHG RX REV CODE 250: Performed by: ANESTHESIOLOGY

## 2024-07-10 PROCEDURE — 700111 HCHG RX REV CODE 636 W/ 250 OVERRIDE (IP): Performed by: ANESTHESIOLOGY

## 2024-07-10 PROCEDURE — 160002 HCHG RECOVERY MINUTES (STAT): Performed by: OPHTHALMOLOGY

## 2024-07-10 PROCEDURE — 160025 RECOVERY II MINUTES (STATS): Performed by: OPHTHALMOLOGY

## 2024-07-10 PROCEDURE — 700102 HCHG RX REV CODE 250 W/ 637 OVERRIDE(OP): Performed by: ANESTHESIOLOGY

## 2024-07-10 PROCEDURE — A9270 NON-COVERED ITEM OR SERVICE: HCPCS | Performed by: ANESTHESIOLOGY

## 2024-07-10 PROCEDURE — 82962 GLUCOSE BLOOD TEST: CPT

## 2024-07-10 PROCEDURE — 700101 HCHG RX REV CODE 250

## 2024-07-10 PROCEDURE — 160046 HCHG PACU - 1ST 60 MINS PHASE II: Performed by: OPHTHALMOLOGY

## 2024-07-10 PROCEDURE — 160029 HCHG SURGERY MINUTES - 1ST 30 MINS LEVEL 4: Performed by: OPHTHALMOLOGY

## 2024-07-10 PROCEDURE — 700105 HCHG RX REV CODE 258: Performed by: OPHTHALMOLOGY

## 2024-07-10 RX ORDER — PHENYLEPHRINE HYDROCHLORIDE 25 MG/ML
1 SOLUTION/ DROPS OPHTHALMIC
Status: COMPLETED | OUTPATIENT
Start: 2024-07-10 | End: 2024-07-10

## 2024-07-10 RX ORDER — ATROPINE SULFATE 10 MG/ML
SOLUTION/ DROPS OPHTHALMIC
Status: DISCONTINUED
Start: 2024-07-10 | End: 2024-07-10 | Stop reason: HOSPADM

## 2024-07-10 RX ORDER — SODIUM CHLORIDE, SODIUM LACTATE, POTASSIUM CHLORIDE, CALCIUM CHLORIDE 600; 310; 30; 20 MG/100ML; MG/100ML; MG/100ML; MG/100ML
INJECTION, SOLUTION INTRAVENOUS CONTINUOUS
Status: DISCONTINUED | OUTPATIENT
Start: 2024-07-10 | End: 2024-07-10 | Stop reason: HOSPADM

## 2024-07-10 RX ORDER — HALOPERIDOL 5 MG/ML
1 INJECTION INTRAMUSCULAR
Status: DISCONTINUED | OUTPATIENT
Start: 2024-07-10 | End: 2024-07-10 | Stop reason: HOSPADM

## 2024-07-10 RX ORDER — MIDAZOLAM HYDROCHLORIDE 1 MG/ML
1 INJECTION INTRAMUSCULAR; INTRAVENOUS
Status: DISCONTINUED | OUTPATIENT
Start: 2024-07-10 | End: 2024-07-10 | Stop reason: HOSPADM

## 2024-07-10 RX ORDER — BALANCED SALT SOLUTION ENRICHED WITH BICARBONATE, DEXTROSE, AND GLUTATHIONE
KIT INTRAOCULAR
Status: DISCONTINUED | OUTPATIENT
Start: 2024-07-10 | End: 2024-07-10 | Stop reason: HOSPADM

## 2024-07-10 RX ORDER — OXYCODONE HCL 5 MG/5 ML
10 SOLUTION, ORAL ORAL
Status: COMPLETED | OUTPATIENT
Start: 2024-07-10 | End: 2024-07-10

## 2024-07-10 RX ORDER — BALANCED SALT SOLUTION ENRICHED WITH BICARBONATE, DEXTROSE, AND GLUTATHIONE
KIT INTRAOCULAR
Status: DISCONTINUED
Start: 2024-07-10 | End: 2024-07-10 | Stop reason: HOSPADM

## 2024-07-10 RX ORDER — BALANCED SALT SOLUTION 6.4; .75; .48; .3; 3.9; 1.7 MG/ML; MG/ML; MG/ML; MG/ML; MG/ML; MG/ML
SOLUTION OPHTHALMIC
Status: DISCONTINUED | OUTPATIENT
Start: 2024-07-10 | End: 2024-07-10 | Stop reason: HOSPADM

## 2024-07-10 RX ORDER — DIPHENHYDRAMINE HYDROCHLORIDE 50 MG/ML
12.5 INJECTION INTRAMUSCULAR; INTRAVENOUS
Status: DISCONTINUED | OUTPATIENT
Start: 2024-07-10 | End: 2024-07-10 | Stop reason: HOSPADM

## 2024-07-10 RX ORDER — LIDOCAINE HYDROCHLORIDE 20 MG/ML
INJECTION, SOLUTION EPIDURAL; INFILTRATION; INTRACAUDAL; PERINEURAL PRN
Status: DISCONTINUED | OUTPATIENT
Start: 2024-07-10 | End: 2024-07-10 | Stop reason: SURG

## 2024-07-10 RX ORDER — ATROPINE SULFATE 10 MG/ML
SOLUTION/ DROPS OPHTHALMIC
Status: DISCONTINUED | OUTPATIENT
Start: 2024-07-10 | End: 2024-07-10 | Stop reason: HOSPADM

## 2024-07-10 RX ORDER — TIMOLOL MALEATE 5 MG/ML
SOLUTION/ DROPS OPHTHALMIC
Status: DISCONTINUED | OUTPATIENT
Start: 2024-07-10 | End: 2024-07-10 | Stop reason: HOSPADM

## 2024-07-10 RX ORDER — DEXAMETHASONE SODIUM PHOSPHATE 4 MG/ML
INJECTION, SOLUTION INTRA-ARTICULAR; INTRALESIONAL; INTRAMUSCULAR; INTRAVENOUS; SOFT TISSUE
Status: DISCONTINUED | OUTPATIENT
Start: 2024-07-10 | End: 2024-07-10 | Stop reason: HOSPADM

## 2024-07-10 RX ORDER — TRIAMCINOLONE ACETONIDE 40 MG/ML
INJECTION, SUSPENSION INTRA-ARTICULAR; INTRAMUSCULAR
Status: DISCONTINUED | OUTPATIENT
Start: 2024-07-10 | End: 2024-07-10 | Stop reason: HOSPADM

## 2024-07-10 RX ORDER — ONDANSETRON 2 MG/ML
INJECTION INTRAMUSCULAR; INTRAVENOUS PRN
Status: DISCONTINUED | OUTPATIENT
Start: 2024-07-10 | End: 2024-07-10 | Stop reason: SURG

## 2024-07-10 RX ORDER — ONDANSETRON 2 MG/ML
4 INJECTION INTRAMUSCULAR; INTRAVENOUS
Status: DISCONTINUED | OUTPATIENT
Start: 2024-07-10 | End: 2024-07-10 | Stop reason: HOSPADM

## 2024-07-10 RX ORDER — OXYCODONE HCL 5 MG/5 ML
5 SOLUTION, ORAL ORAL
Status: COMPLETED | OUTPATIENT
Start: 2024-07-10 | End: 2024-07-10

## 2024-07-10 RX ORDER — MEPERIDINE HYDROCHLORIDE 25 MG/ML
12.5 INJECTION INTRAMUSCULAR; INTRAVENOUS; SUBCUTANEOUS
Status: DISCONTINUED | OUTPATIENT
Start: 2024-07-10 | End: 2024-07-10 | Stop reason: HOSPADM

## 2024-07-10 RX ORDER — CYCLOPENTOLATE HYDROCHLORIDE 10 MG/ML
1 SOLUTION/ DROPS OPHTHALMIC
Status: COMPLETED | OUTPATIENT
Start: 2024-07-10 | End: 2024-07-10

## 2024-07-10 RX ORDER — PHENYLEPHRINE HYDROCHLORIDE 25 MG/ML
SOLUTION/ DROPS OPHTHALMIC
Status: COMPLETED
Start: 2024-07-10 | End: 2024-07-10

## 2024-07-10 RX ORDER — NEOMYCIN SULFATE, POLYMYXIN B SULFATE, AND DEXAMETHASONE 3.5; 10000; 1 MG/G; [USP'U]/G; MG/G
OINTMENT OPHTHALMIC
Status: DISCONTINUED | OUTPATIENT
Start: 2024-07-10 | End: 2024-07-10 | Stop reason: HOSPADM

## 2024-07-10 RX ADMIN — FENTANYL CITRATE 25 MCG: 50 INJECTION, SOLUTION INTRAMUSCULAR; INTRAVENOUS at 13:14

## 2024-07-10 RX ADMIN — PHENYLEPHRINE HYDROCHLORIDE 1 DROP: 25 SOLUTION/ DROPS OPHTHALMIC at 12:01

## 2024-07-10 RX ADMIN — LIDOCAINE HYDROCHLORIDE 50 MG: 20 INJECTION, SOLUTION EPIDURAL; INFILTRATION; INTRACAUDAL at 12:56

## 2024-07-10 RX ADMIN — PHENYLEPHRINE HYDROCHLORIDE 1 DROP: 25 SOLUTION/ DROPS OPHTHALMIC at 12:17

## 2024-07-10 RX ADMIN — OXYCODONE HYDROCHLORIDE 5 MG: 5 SOLUTION ORAL at 14:17

## 2024-07-10 RX ADMIN — FENTANYL CITRATE 50 MCG: 50 INJECTION, SOLUTION INTRAMUSCULAR; INTRAVENOUS at 12:53

## 2024-07-10 RX ADMIN — PHENYLEPHRINE HYDROCHLORIDE 1 DROP: 25 SOLUTION/ DROPS OPHTHALMIC at 11:49

## 2024-07-10 RX ADMIN — CYCLOPENTOLATE HYDROCHLORIDE 1 DROP: 10 SOLUTION OPHTHALMIC at 11:49

## 2024-07-10 RX ADMIN — CYCLOPENTOLATE HYDROCHLORIDE 1 DROP: 10 SOLUTION OPHTHALMIC at 12:01

## 2024-07-10 RX ADMIN — CYCLOPENTOLATE HYDROCHLORIDE 1 DROP: 10 SOLUTION OPHTHALMIC at 12:17

## 2024-07-10 RX ADMIN — PROPOFOL 150 MG: 10 INJECTION, EMULSION INTRAVENOUS at 12:56

## 2024-07-10 RX ADMIN — SODIUM CHLORIDE, POTASSIUM CHLORIDE, SODIUM LACTATE AND CALCIUM CHLORIDE: 600; 310; 30; 20 INJECTION, SOLUTION INTRAVENOUS at 12:04

## 2024-07-10 RX ADMIN — ONDANSETRON 4 MG: 2 INJECTION INTRAMUSCULAR; INTRAVENOUS at 13:28

## 2024-07-10 ASSESSMENT — PAIN DESCRIPTION - PAIN TYPE
TYPE: ACUTE PAIN
TYPE: SURGICAL PAIN

## 2024-07-10 ASSESSMENT — FIBROSIS 4 INDEX: FIB4 SCORE: 1.21

## 2024-07-10 ASSESSMENT — PAIN SCALES - GENERAL: PAIN_LEVEL: 2

## 2024-09-19 NOTE — TELEPHONE ENCOUNTER
Received request via: Pharmacy    Was the patient seen in the last year in this department? Yes    Does the patient have an active prescription (recently filled or refills available) for medication(s) requested? No    Pharmacy Name: [facility]    Does the patient have jail Plus and need 100-day supply? (This applies to ALL medications) Yes, quantity updated to 100 days

## 2024-09-20 RX ORDER — LISINOPRIL 10 MG/1
10 TABLET ORAL DAILY
Qty: 90 TABLET | Refills: 0 | Status: SHIPPED | OUTPATIENT
Start: 2024-09-20

## 2024-11-16 ENCOUNTER — HOSPITAL ENCOUNTER (OUTPATIENT)
Facility: MEDICAL CENTER | Age: 79
End: 2024-11-16
Payer: MEDICARE

## 2024-11-16 ENCOUNTER — NON-PROVIDER VISIT (OUTPATIENT)
Dept: FAMILY PLANNING/WOMEN'S HEALTH CLINIC | Facility: PHYSICIAN GROUP | Age: 79
End: 2024-11-16
Payer: MEDICARE

## 2024-11-16 DIAGNOSIS — E11.65 TYPE 2 DIABETES MELLITUS WITH HYPERGLYCEMIA, WITHOUT LONG-TERM CURRENT USE OF INSULIN (HCC): ICD-10-CM

## 2024-11-16 LAB
HBA1C MFR BLD: 7 % (ref ?–5.8)
POCT INT CON NEG: NEGATIVE
POCT INT CON POS: POSITIVE

## 2024-11-16 PROCEDURE — 82570 ASSAY OF URINE CREATININE: CPT

## 2024-11-16 PROCEDURE — 82043 UR ALBUMIN QUANTITATIVE: CPT

## 2024-11-16 PROCEDURE — 83036 HEMOGLOBIN GLYCOSYLATED A1C: CPT

## 2024-11-17 LAB
CREAT UR-MCNC: 29.62 MG/DL
MICROALBUMIN UR-MCNC: 2.5 MG/DL
MICROALBUMIN/CREAT UR: 84 MG/G (ref 0–30)

## 2024-11-18 ENCOUNTER — OFFICE VISIT (OUTPATIENT)
Dept: MEDICAL GROUP | Facility: MEDICAL CENTER | Age: 79
End: 2024-11-18
Payer: MEDICARE

## 2024-11-18 VITALS
HEART RATE: 75 BPM | BODY MASS INDEX: 26.5 KG/M2 | DIASTOLIC BLOOD PRESSURE: 78 MMHG | HEIGHT: 62 IN | TEMPERATURE: 97.8 F | SYSTOLIC BLOOD PRESSURE: 128 MMHG | OXYGEN SATURATION: 96 % | WEIGHT: 144 LBS

## 2024-11-18 DIAGNOSIS — E55.9 VITAMIN D DEFICIENCY: ICD-10-CM

## 2024-11-18 DIAGNOSIS — M81.0 AGE-RELATED OSTEOPOROSIS WITHOUT CURRENT PATHOLOGICAL FRACTURE: ICD-10-CM

## 2024-11-18 DIAGNOSIS — R35.0 URINARY FREQUENCY: ICD-10-CM

## 2024-11-18 DIAGNOSIS — E78.00 HYPERCHOLESTEREMIA: ICD-10-CM

## 2024-11-18 DIAGNOSIS — E11.65 TYPE 2 DIABETES MELLITUS WITH HYPERGLYCEMIA, WITHOUT LONG-TERM CURRENT USE OF INSULIN (HCC): ICD-10-CM

## 2024-11-18 DIAGNOSIS — I10 PRIMARY HYPERTENSION: ICD-10-CM

## 2024-11-18 LAB
APPEARANCE UR: CLEAR
BILIRUB UR STRIP-MCNC: NEGATIVE MG/DL
COLOR UR AUTO: YELLOW
GLUCOSE UR STRIP.AUTO-MCNC: NEGATIVE MG/DL
KETONES UR STRIP.AUTO-MCNC: NEGATIVE MG/DL
LEUKOCYTE ESTERASE UR QL STRIP.AUTO: NEGATIVE
NITRITE UR QL STRIP.AUTO: NEGATIVE
PH UR STRIP.AUTO: 7.5 [PH] (ref 5–8)
PROT UR QL STRIP: NEGATIVE MG/DL
RBC UR QL AUTO: NEGATIVE
SP GR UR STRIP.AUTO: 1.01
UROBILINOGEN UR STRIP-MCNC: 0.2 MG/DL

## 2024-11-18 PROCEDURE — 3078F DIAST BP <80 MM HG: CPT | Performed by: NURSE PRACTITIONER

## 2024-11-18 PROCEDURE — 99214 OFFICE O/P EST MOD 30 MIN: CPT | Performed by: NURSE PRACTITIONER

## 2024-11-18 PROCEDURE — 3074F SYST BP LT 130 MM HG: CPT | Performed by: NURSE PRACTITIONER

## 2024-11-18 PROCEDURE — 81002 URINALYSIS NONAUTO W/O SCOPE: CPT | Performed by: NURSE PRACTITIONER

## 2024-11-18 RX ORDER — METFORMIN HYDROCHLORIDE 500 MG/1
1000 TABLET, EXTENDED RELEASE ORAL DAILY
Qty: 180 TABLET | Refills: 1 | Status: SHIPPED | OUTPATIENT
Start: 2024-11-18

## 2024-11-18 RX ORDER — ROSUVASTATIN CALCIUM 5 MG/1
5 TABLET, COATED ORAL EVERY EVENING
Qty: 90 TABLET | Refills: 3 | Status: SHIPPED | OUTPATIENT
Start: 2024-11-18

## 2024-11-18 ASSESSMENT — FIBROSIS 4 INDEX: FIB4 SCORE: 1.23

## 2024-11-18 NOTE — PATIENT INSTRUCTIONS
Check fasting labs-Ordered, do in next two weeks  Increase Metformin to 1000 mg (2 tablets) daily  Increase exercise  Cut back on carbs and sugars

## 2024-11-19 PROBLEM — E11.9 TYPE 2 DIABETES MELLITUS WITHOUT COMPLICATION, WITHOUT LONG-TERM CURRENT USE OF INSULIN (HCC): Status: RESOLVED | Noted: 2024-11-19 | Resolved: 2024-11-19

## 2024-11-19 PROBLEM — E11.9 TYPE 2 DIABETES MELLITUS WITHOUT COMPLICATION, WITHOUT LONG-TERM CURRENT USE OF INSULIN (HCC): Status: ACTIVE | Noted: 2024-11-19

## 2024-11-19 NOTE — PROGRESS NOTES
Subjective:     History of Present Illness  The patient presents for evaluation of multiple medical concerns.    She has been experiencing persistent dizziness for the past few weeks, which does not subside. She is curious if her dizziness could be related to her recent diabetes diagnosis    She underwent a diabetic screening recently at [facility], which included an A1c test that showed a level of 7.0. She also had a urine test, the results of which she is unsure about. She is currently on metformin, taking 500 mg daily, but is considering increasing the dosage to two tablets as she did in 2017. She has noticed occasional tingling in her feet. She was originally diagnosed with diabetes in 2017 with a A1C of 6.7, but with addition of metformin and diet changes she has maintained prediabetic range since.     She monitors her blood pressure at home, which was 147/90 at 5 am today, but decreased to 126/70 after taking lisinopril. She continues to take lisinopril 10 mg daily. She had an EKG done prior to her retina surgery which she reports was normal. She reports no fatigue or shortness of breath, except when climbing stairs.    She has been dealing with acid reflux and heartburn for a couple of years and takes omeprazole extended release, but finds that her symptoms return if she stops taking it after 2 weeks. She also takes Tums and occasionally uses Tylenol or ibuprofen for body aches.    She last saw her cardiologist in 2022, who recommended increasing her lovastatin dosage to 20 mg. However, she experienced increased fatigue and body aches, which she believes were side effects of the increased dosage. As a result, she stopped taking lovastatin. She has not taken other statins in the past.     She has noticed a worsening of her incontinence, with increased frequency of urination.    She had 2 eye surgeries this year.      Current medicines (including changes today)  Current Outpatient  Medications   Medication Sig Dispense Refill    metFORMIN ER (GLUCOPHAGE XR) 500 MG TABLET SR 24 HR Take 2 Tablets by mouth every day. 180 Tablet 1    rosuvastatin (CRESTOR) 5 MG Tab Take 1 Tablet by mouth every evening. 90 Tablet 3    lisinopril (PRINIVIL) 10 MG Tab Take 1 tablet by mouth once daily 90 Tablet 0    Cholecalciferol (VITAMIN D) 2000 UNIT Tab Take 2,000 Units by mouth every day.     *MEDICATION INSTRUCTIONS FOR SURGERY/PROCEDURE SCHEDULED FOR 7/10/2024   DO NOT TAKE 7 DAYS PRIOR TO SURGERY      CRANBERRY PO Take  by mouth every day.       *MEDICATION INSTRUCTIONS FOR SURGERY/PROCEDURE SCHEDULED FOR 7/10/2024   DO NOT TAKE 7 DAYS PRIOR TO SURGERY      omeprazole (PRILOSEC) 20 MG delayed-release capsule Take 20 mg by mouth every day.       *MEDICATION INSTRUCTIONS FOR SURGERY/PROCEDURE SCHEDULED FOR 7/10/2024.   OK TO CONTINUE TAKING PRIOR TO SURGERY AND DAY OF SURGERY      Coenzyme Q10 (COQ10 PO) Take  by mouth every day.       *MEDICATION INSTRUCTIONS FOR SURGERY/PROCEDURE SCHEDULED FOR 7/10/2024   DO NOT TAKE 7 DAYS PRIOR TO SURGERY      Non Formulary Request every day. HERBPANTERA GOLD      *MEDICATION INSTRUCTIONS FOR SURGERY/PROCEDURE SCHEDULED FOR 7/10/2024   DO NOT TAKE 7 DAYS PRIOR TO SURGERY      Calcium Carb-Cholecalciferol (CALCIUM 600 + D PO) Take 2 Tablets by mouth every day.       *MEDICATION INSTRUCTIONS FOR SURGERY/PROCEDURE SCHEDULED FOR 7/10/2024   DO NOT TAKE 7 DAYS PRIOR TO SURGERY      ibuprofen (MOTRIN) 200 MG Tab Take 200 mg by mouth every 6 hours as needed.       *MEDICATION INSTRUCTIONS FOR SURGERY/PROCEDURE SCHEDULED FOR 7/10/2024   DO NOT TAKE 5 DAYS PRIOR TO SURGERY      [employer] TRIPLE MAGNESIUM COMPLEX PO every day.       *MEDICATION INSTRUCTIONS FOR SURGERY/PROCEDURE SCHEDULED FOR 7/10/2024   DO NOT TAKE 7 DAYS PRIOR TO SURGERY      Lactobacillus (PROBIOTIC ACIDOPHILUS PO) every day.       *MEDICATION INSTRUCTIONS FOR SURGERY/PROCEDURE SCHEDULED FOR 7/10/2024   DO NOT TAKE 7  "DAYS PRIOR TO SURGERY      B Complex Vitamins (B COMPLEX B-12 PO) every day.       *MEDICATION INSTRUCTIONS FOR SURGERY/PROCEDURE SCHEDULED FOR 7/10/2024   DO NOT TAKE 7 DAYS PRIOR TO SURGERY      aspirin 81 MG tablet Take 1 Tab by mouth every day. (Patient taking differently: Take 81 mg by mouth every day.     *MEDICATION INSTRUCTIONS FOR SURGERY/PROCEDURE SCHEDULED FOR 7/10/2024   REFER TO ORDERING PHYSICIAN OR SURGEON FOR INSTRUCTIONS) 100 Tab 3     No current facility-administered medications for this visit.     She  has a past medical history of Anesthesia (07/05/2024), Cataract, Diabetes (HCC), Hearing loss, Heart burn, Heart murmur, High cholesterol, Hyperlipidemia, Hypertension, Osteoporosis, and Urinary incontinence.    She has no past medical history of Acute nasopharyngitis, Anginal syndrome (HCC), Arrhythmia, Arthritis, Asthma, Blood clotting disorder (HCC), Bowel habit changes, Breath shortness, Bronchitis, Cancer (HCC), Carcinoma in situ of respiratory system, Congestive heart failure (HCC), Continuous ambulatory peritoneal dialysis status (HCC), Coughing blood, Dental disorder, Dialysis patient (HCC), Disorder of thyroid, Emphysema of lung (HCC), Glaucoma, Gynecological disorder, Heart valve disease, Hemorrhagic disorder (HCC), Hepatitis A, Hepatitis B, Hepatitis C, Hiatus hernia syndrome, Indigestion, Infectious disease, Jaundice, Myocardial infarct (HCC), Pacemaker, Pain, Pneumonia, Pregnant, Psychiatric problem, Renal disorder, Rheumatic fever, Seizure (HCC), Sleep apnea, Snoring, Stroke (HCC), Tuberculosis, or Urinary bladder disorder.    ROS   No chest pain, no shortness of breath, no abdominal pain  Positive ROS as per HPI.  All other systems reviewed and are negative.     Objective:     /78   Pulse 75   Temp 36.6 °C (97.8 °F) (Temporal)   Ht 1.575 m (5' 2\")   Wt 65.3 kg (144 lb)   SpO2 96%  Body mass index is 26.34 kg/m².   Physical Exam  Vital Signs  Blood pressure reading is " 128/78.  Constitutional: Alert, no distress.  Skin: Warm, dry, good turgor, no rashes in visible areas.  Eye: Equal, round and reactive, conjunctiva clear, lids normal.  ENMT: Lips without lesions, good dentition  Respiratory: Unlabored respiratory effort  Psych: Alert and oriented x3, normal affect and mood.      Results  Laboratory Studies  A1c is 7. Urine test shows protein presence.        Assessment and Plan:   The following treatment plan was discussed    Assessment & Plan  1. Diabetes Mellitus.  Her A1c levels have increased from 6.4 in 2023 to 6.9 in March 2024, and now to 7, indicating a progression from prediabetes to diabetes. The presence of protein in her urine suggests potential kidney damage due to elevated blood sugar levels. Her dizziness could be a symptom of high blood sugar. The dosage of metformin will be increased to 2 tablets once daily. A urine test will be conducted today to rule out infection. She is advised to limit her intake of carbohydrates and sugar, and to increase her consumption of protein, healthy fats, fruits, and vegetables. Regular blood tests will be ordered to monitor her condition. She is advised to avoid ibuprofen and to continue taking Tylenol as needed for aches and pains. Increased physical activity and reduced sugar intake could potentially decrease her need for metformin.     2. Hypercholesterolemia.  A trial of rosuvastatin 5 mg daily will be prescribed to manage her cholesterol levels.  If she tolerates the new statin without side effects, the dosage may be gradually increased as needed to achieve target cholesterol levels.    3. Hypertension.  Her blood pressure is within normal range today at 128/78. She is advised to continue taking lisinopril 10 mg daily and to monitor her blood pressure at home. If her blood pressure remains well-controlled, the current regimen will be maintained.    4. Gastroesophageal Reflux Disease (GERD).  She has been experiencing acid  reflux and heartburn for a couple of years and has been taking omeprazole. She is advised to take omeprazole daily for a week to manage symptoms, then reduce the frequency to every other day or every three days as needed. She should avoid trigger foods such as chocolate, caffeine, and acidic foods. Tums can be used as needed for symptom relief. She is advised to avoid ibuprofen as it can exacerbate her symptoms.    5. Dizziness.  Her dizziness could be related to high blood sugar levels. Blood tests will be ordered to rule out other potential causes. If her dizziness persists despite improved blood sugar control, further evaluation, including a repeat echocardiogram, may be considered due to history of known aortic stenosis, last echo last year showed mild degree.     Follow-up  Patient is scheduled for a follow-up visit in March 2025.      ORDERS:  1. Type 2 diabetes mellitus without complication, without long-term current use of insulin (HCC)  - metFORMIN ER (GLUCOPHAGE XR) 500 MG TABLET SR 24 HR; Take 2 Tablets by mouth every day.  Dispense: 180 Tablet; Refill: 1    2. Vitamin D deficiency  - VITAMIN D,25 HYDROXY (DEFICIENCY); Future    3. Type 2 diabetes mellitus with hyperglycemia, without long-term current use of insulin (HCC)    4. Primary hypertension  - CBC WITH DIFFERENTIAL; Future  - Comp Metabolic Panel; Future  - MICROALBUMIN CREAT RATIO URINE; Future  - TSH WITH REFLEX TO FT4; Future    5. Age-related osteoporosis without current pathological fracture  - VITAMIN D,25 HYDROXY (DEFICIENCY); Future    6. Hypercholesteremia  - Lipid Profile; Future    7. Urinary frequency  - POCT Urinalysis          The MA is performing the above orders under the direction of Dr. Ledesma    Please note that this dictation was created using voice recognition software. I have made every reasonable attempt to correct obvious errors, but I expect that there are errors of grammar and possibly content that I did not discover  before finalizing the note.      Attestation      Verbal consent was acquired by the patient to use [employer] ambient listening note generation during this visit Yes

## 2024-11-20 ENCOUNTER — HOSPITAL ENCOUNTER (OUTPATIENT)
Dept: LAB | Facility: MEDICAL CENTER | Age: 79
End: 2024-11-20
Attending: NURSE PRACTITIONER
Payer: MEDICARE

## 2024-11-20 DIAGNOSIS — M81.0 AGE-RELATED OSTEOPOROSIS WITHOUT CURRENT PATHOLOGICAL FRACTURE: ICD-10-CM

## 2024-11-20 DIAGNOSIS — E55.9 VITAMIN D DEFICIENCY: ICD-10-CM

## 2024-11-20 DIAGNOSIS — I10 PRIMARY HYPERTENSION: ICD-10-CM

## 2024-11-20 DIAGNOSIS — E11.65 TYPE 2 DIABETES MELLITUS WITH HYPERGLYCEMIA, WITHOUT LONG-TERM CURRENT USE OF INSULIN (HCC): ICD-10-CM

## 2024-11-20 DIAGNOSIS — E78.00 HYPERCHOLESTEREMIA: ICD-10-CM

## 2024-11-20 LAB
25(OH)D3 SERPL-MCNC: 45 NG/ML (ref 30–100)
ALBUMIN SERPL BCP-MCNC: 4.3 G/DL (ref 3.2–4.9)
ALBUMIN/GLOB SERPL: 1.3 G/DL
ALP SERPL-CCNC: 68 U/L (ref 30–99)
ALT SERPL-CCNC: 9 U/L (ref 2–50)
ANION GAP SERPL CALC-SCNC: 16 MMOL/L (ref 7–16)
AST SERPL-CCNC: 17 U/L (ref 12–45)
BASOPHILS # BLD AUTO: 0.8 % (ref 0–1.8)
BASOPHILS # BLD: 0.05 K/UL (ref 0–0.12)
BILIRUB SERPL-MCNC: 0.5 MG/DL (ref 0.1–1.5)
BUN SERPL-MCNC: 15 MG/DL (ref 8–22)
CALCIUM ALBUM COR SERPL-MCNC: 9.1 MG/DL (ref 8.5–10.5)
CALCIUM SERPL-MCNC: 9.3 MG/DL (ref 8.5–10.5)
CHLORIDE SERPL-SCNC: 101 MMOL/L (ref 96–112)
CHOLEST SERPL-MCNC: 243 MG/DL (ref 100–199)
CO2 SERPL-SCNC: 20 MMOL/L (ref 20–33)
CREAT SERPL-MCNC: 0.79 MG/DL (ref 0.5–1.4)
CREAT UR-MCNC: 273 MG/DL
EOSINOPHIL # BLD AUTO: 0.18 K/UL (ref 0–0.51)
EOSINOPHIL NFR BLD: 2.9 % (ref 0–6.9)
ERYTHROCYTE [DISTWIDTH] IN BLOOD BY AUTOMATED COUNT: 40.6 FL (ref 35.9–50)
GFR SERPLBLD CREATININE-BSD FMLA CKD-EPI: 77 ML/MIN/1.73 M 2
GLOBULIN SER CALC-MCNC: 3.2 G/DL (ref 1.9–3.5)
GLUCOSE SERPL-MCNC: 146 MG/DL (ref 65–99)
HCT VFR BLD AUTO: 42.1 % (ref 37–47)
HDLC SERPL-MCNC: 49 MG/DL
HGB BLD-MCNC: 14.4 G/DL (ref 12–16)
IMM GRANULOCYTES # BLD AUTO: 0.03 K/UL (ref 0–0.11)
IMM GRANULOCYTES NFR BLD AUTO: 0.5 % (ref 0–0.9)
LDLC SERPL CALC-MCNC: 157 MG/DL
LYMPHOCYTES # BLD AUTO: 2.59 K/UL (ref 1–4.8)
LYMPHOCYTES NFR BLD: 41 % (ref 22–41)
MCH RBC QN AUTO: 29.9 PG (ref 27–33)
MCHC RBC AUTO-ENTMCNC: 34.2 G/DL (ref 32.2–35.5)
MCV RBC AUTO: 87.3 FL (ref 81.4–97.8)
MICROALBUMIN UR-MCNC: 5.9 MG/DL
MICROALBUMIN/CREAT UR: 22 MG/G (ref 0–30)
MONOCYTES # BLD AUTO: 0.53 K/UL (ref 0–0.85)
MONOCYTES NFR BLD AUTO: 8.4 % (ref 0–13.4)
NEUTROPHILS # BLD AUTO: 2.93 K/UL (ref 1.82–7.42)
NEUTROPHILS NFR BLD: 46.4 % (ref 44–72)
NRBC # BLD AUTO: 0 K/UL
NRBC BLD-RTO: 0 /100 WBC (ref 0–0.2)
PLATELET # BLD AUTO: 284 K/UL (ref 164–446)
PMV BLD AUTO: 8.4 FL (ref 9–12.9)
POTASSIUM SERPL-SCNC: 4.2 MMOL/L (ref 3.6–5.5)
PROT SERPL-MCNC: 7.5 G/DL (ref 6–8.2)
RBC # BLD AUTO: 4.82 M/UL (ref 4.2–5.4)
SODIUM SERPL-SCNC: 137 MMOL/L (ref 135–145)
TRIGL SERPL-MCNC: 183 MG/DL (ref 0–149)
TSH SERPL DL<=0.005 MIU/L-ACNC: 1.62 UIU/ML (ref 0.38–5.33)
WBC # BLD AUTO: 6.3 K/UL (ref 4.8–10.8)

## 2024-11-20 PROCEDURE — 80053 COMPREHEN METABOLIC PANEL: CPT

## 2024-11-20 PROCEDURE — 85025 COMPLETE CBC W/AUTO DIFF WBC: CPT

## 2024-11-20 PROCEDURE — 82570 ASSAY OF URINE CREATININE: CPT

## 2024-11-20 PROCEDURE — 36415 COLL VENOUS BLD VENIPUNCTURE: CPT

## 2024-11-20 PROCEDURE — 82306 VITAMIN D 25 HYDROXY: CPT

## 2024-11-20 PROCEDURE — 82043 UR ALBUMIN QUANTITATIVE: CPT

## 2024-11-20 PROCEDURE — 84443 ASSAY THYROID STIM HORMONE: CPT

## 2024-11-20 PROCEDURE — 80061 LIPID PANEL: CPT

## 2024-12-24 ENCOUNTER — OFFICE VISIT (OUTPATIENT)
Dept: MEDICAL GROUP | Age: 79
End: 2024-12-24
Payer: MEDICARE

## 2024-12-24 ENCOUNTER — HOSPITAL ENCOUNTER (OUTPATIENT)
Facility: MEDICAL CENTER | Age: 79
End: 2024-12-24
Attending: PHYSICIAN ASSISTANT
Payer: MEDICARE

## 2024-12-24 VITALS
SYSTOLIC BLOOD PRESSURE: 120 MMHG | DIASTOLIC BLOOD PRESSURE: 64 MMHG | HEART RATE: 64 BPM | WEIGHT: 141 LBS | HEIGHT: 64 IN | OXYGEN SATURATION: 97 % | BODY MASS INDEX: 24.07 KG/M2 | TEMPERATURE: 97 F

## 2024-12-24 DIAGNOSIS — R39.15 URINARY URGENCY: ICD-10-CM

## 2024-12-24 DIAGNOSIS — E11.65 TYPE 2 DIABETES MELLITUS WITH HYPERGLYCEMIA, WITHOUT LONG-TERM CURRENT USE OF INSULIN (HCC): ICD-10-CM

## 2024-12-24 DIAGNOSIS — R30.0 DYSURIA: ICD-10-CM

## 2024-12-24 DIAGNOSIS — N39.46 MIXED STRESS AND URGE URINARY INCONTINENCE: ICD-10-CM

## 2024-12-24 LAB
APPEARANCE UR: CLEAR
BILIRUB UR STRIP-MCNC: NEGATIVE MG/DL
COLOR UR AUTO: YELLOW
GLUCOSE UR STRIP.AUTO-MCNC: NEGATIVE MG/DL
KETONES UR STRIP.AUTO-MCNC: NEGATIVE MG/DL
LEUKOCYTE ESTERASE UR QL STRIP.AUTO: NEGATIVE
NITRITE UR QL STRIP.AUTO: NEGATIVE
PH UR STRIP.AUTO: 6 [PH] (ref 5–8)
PROT UR QL STRIP: NEGATIVE MG/DL
RBC UR QL AUTO: NEGATIVE
SP GR UR STRIP.AUTO: 1.01
UROBILINOGEN UR STRIP-MCNC: 0.2 MG/DL

## 2024-12-24 PROCEDURE — 99214 OFFICE O/P EST MOD 30 MIN: CPT | Performed by: PHYSICIAN ASSISTANT

## 2024-12-24 PROCEDURE — 87086 URINE CULTURE/COLONY COUNT: CPT

## 2024-12-24 PROCEDURE — 81002 URINALYSIS NONAUTO W/O SCOPE: CPT | Performed by: PHYSICIAN ASSISTANT

## 2024-12-24 PROCEDURE — 3078F DIAST BP <80 MM HG: CPT | Performed by: PHYSICIAN ASSISTANT

## 2024-12-24 PROCEDURE — 3074F SYST BP LT 130 MM HG: CPT | Performed by: PHYSICIAN ASSISTANT

## 2024-12-24 ASSESSMENT — FIBROSIS 4 INDEX: FIB4 SCORE: 1.54

## 2024-12-24 NOTE — PROGRESS NOTES
cc: Dysuria    Subjective:     HPI    History of Present Illness  The patient is a 79-year-old female presenting with concerns of dysuria.  She is unable to see her PCP Katya THOMAS today    She began experiencing symptoms indicative of a urinary tract infection (UTI) 4 days ago.  These symptoms included urinary urgency, painful urination, and shivering during bladder emptying.  She started taking over-the-counter antispasmodic, and she reports no current symptoms today.  Denies fever, chills, sweats, dysuria, hematuria, back pain or pelvic pain    She also reports a history of incontinence, characterized by frequent urges to urinate. However, she has noticed a decrease in bladder leakage since starting the over-the-counter medication. She has previously tried oxybutynin and Myrbetriq for her condition but discontinued their use due to side effects.          Review of systems:  See above.       Current Outpatient Medications:     metFORMIN ER (GLUCOPHAGE XR) 500 MG TABLET SR 24 HR, Take 2 Tablets by mouth every day., Disp: 180 Tablet, Rfl: 1    rosuvastatin (CRESTOR) 5 MG Tab, Take 1 Tablet by mouth every evening., Disp: 90 Tablet, Rfl: 3    lisinopril (PRINIVIL) 10 MG Tab, Take 1 tablet by mouth once daily, Disp: 90 Tablet, Rfl: 0    Cholecalciferol (VITAMIN D) 2000 UNIT Tab, Take 2,000 Units by mouth every day.   *MEDICATION INSTRUCTIONS FOR SURGERY/PROCEDURE SCHEDULED FOR 7/10/2024  DO NOT TAKE 7 DAYS PRIOR TO SURGERY, Disp: , Rfl:     CRANBERRY PO, Take  by mouth every day.    *MEDICATION INSTRUCTIONS FOR SURGERY/PROCEDURE SCHEDULED FOR 7/10/2024  DO NOT TAKE 7 DAYS PRIOR TO SURGERY, Disp: , Rfl:     omeprazole (PRILOSEC) 20 MG delayed-release capsule, Take 20 mg by mouth every day.    *MEDICATION INSTRUCTIONS FOR SURGERY/PROCEDURE SCHEDULED FOR 7/10/2024.  OK TO CONTINUE TAKING PRIOR TO SURGERY AND DAY OF SURGERY, Disp: , Rfl:     Coenzyme Q10 (COQ10 PO), Take  by mouth every day.    *MEDICATION  "INSTRUCTIONS FOR SURGERY/PROCEDURE SCHEDULED FOR 7/10/2024  DO NOT TAKE 7 DAYS PRIOR TO SURGERY, Disp: , Rfl:     Non Formulary Request, every day. THANH STEVENSON   *MEDICATION INSTRUCTIONS FOR SURGERY/PROCEDURE SCHEDULED FOR 7/10/2024  DO NOT TAKE 7 DAYS PRIOR TO SURGERY, Disp: , Rfl:     Calcium Carb-Cholecalciferol (CALCIUM 600 + D PO), Take 2 Tablets by mouth every day.    *MEDICATION INSTRUCTIONS FOR SURGERY/PROCEDURE SCHEDULED FOR 7/10/2024  DO NOT TAKE 7 DAYS PRIOR TO SURGERY, Disp: , Rfl:     ibuprofen (MOTRIN) 200 MG Tab, Take 200 mg by mouth every 6 hours as needed.    *MEDICATION INSTRUCTIONS FOR SURGERY/PROCEDURE SCHEDULED FOR 7/10/2024  DO NOT TAKE 5 DAYS PRIOR TO SURGERY, Disp: , Rfl:     CVS TRIPLE MAGNESIUM COMPLEX PO, every day.    *MEDICATION INSTRUCTIONS FOR SURGERY/PROCEDURE SCHEDULED FOR 7/10/2024  DO NOT TAKE 7 DAYS PRIOR TO SURGERY, Disp: , Rfl:     Lactobacillus (PROBIOTIC ACIDOPHILUS PO), every day.    *MEDICATION INSTRUCTIONS FOR SURGERY/PROCEDURE SCHEDULED FOR 7/10/2024  DO NOT TAKE 7 DAYS PRIOR TO SURGERY, Disp: , Rfl:     B Complex Vitamins (B COMPLEX B-12 PO), every day.    *MEDICATION INSTRUCTIONS FOR SURGERY/PROCEDURE SCHEDULED FOR 7/10/2024  DO NOT TAKE 7 DAYS PRIOR TO SURGERY, Disp: , Rfl:     aspirin 81 MG tablet, Take 1 Tab by mouth every day. (Patient taking differently: Take 81 mg by mouth every day.   *MEDICATION INSTRUCTIONS FOR SURGERY/PROCEDURE SCHEDULED FOR 7/10/2024  REFER TO ORDERING PHYSICIAN OR SURGEON FOR INSTRUCTIONS), Disp: 100 Tab, Rfl: 3    Allergies, past medical history, past surgical history, family history, social history reviewed and updated    Objective:     Vitals: /64 (BP Location: Right arm, Patient Position: Sitting, BP Cuff Size: Adult)   Pulse 64   Temp 36.1 °C (97 °F) (Temporal)   Ht 1.626 m (5' 4\")   Wt 64 kg (141 lb)   SpO2 97%   BMI 24.20 kg/m²   General: Alert, pleasant, NAD  HEENT: Normocephalic. Neck supple.   No carotid bruits "   Heart: Regular rate and rhythm.  S1 and S2 normal.  3/6 murmurs appreciated.  Respiratory: Normal respiratory effort.  Clear to auscultation bilaterally.  Abdomen:  Normoactive bowel sounds.  Non-distended, soft, non-tender, no guarding/rebound. No hepatosplenomegaly.    Back: No CVAT  Skin: Warm, dry, no rashes.  Extremities: No leg edema.  Radial pulses 2+ symmetric  Psych:  Affect/mood is normal, judgement is good, memory is intact, grooming is appropriate.    Assessment/Plan:     Kacey was seen today for uti.    Diagnoses and all orders for this visit:    Dysuria  -UA was within normal limits in clinic.  No evidence of urinary tract infection.  However will send urine for culture.  She is asymptomatic as of today.  Further treatment as needed pending results  -     Urine Culture; Future    Mixed stress and urge urinary incontinence  Previously tried Myrbetriq and oxybutynin.  Did not tolerate due to side effects.  Advise follow-up with PCP to discuss alternative management, possible referral to urology    Urinary urgency  -     POCT Urinalysis  -     Urine Culture; Future    Type 2 diabetes mellitus with hyperglycemia, without long-term current use of insulin (HCC)  Currently managed by PCP.  Continue metformin 1000 mg daily      Return if symptoms worsen or fail to improve.

## 2024-12-26 LAB
BACTERIA UR CULT: NORMAL
SIGNIFICANT IND 70042: NORMAL
SITE SITE: NORMAL
SOURCE SOURCE: NORMAL

## 2025-01-06 SDOH — ECONOMIC STABILITY: FOOD INSECURITY: WITHIN THE PAST 12 MONTHS, YOU WORRIED THAT YOUR FOOD WOULD RUN OUT BEFORE YOU GOT MONEY TO BUY MORE.: NEVER TRUE

## 2025-01-06 SDOH — HEALTH STABILITY: PHYSICAL HEALTH: ON AVERAGE, HOW MANY MINUTES DO YOU ENGAGE IN EXERCISE AT THIS LEVEL?: 30 MIN

## 2025-01-06 SDOH — HEALTH STABILITY: MENTAL HEALTH
STRESS IS WHEN SOMEONE FEELS TENSE, NERVOUS, ANXIOUS, OR CAN'T SLEEP AT NIGHT BECAUSE THEIR MIND IS TROUBLED. HOW STRESSED ARE YOU?: NOT AT ALL

## 2025-01-06 SDOH — HEALTH STABILITY: PHYSICAL HEALTH: ON AVERAGE, HOW MANY DAYS PER WEEK DO YOU ENGAGE IN MODERATE TO STRENUOUS EXERCISE (LIKE A BRISK WALK)?: 6 DAYS

## 2025-01-06 SDOH — ECONOMIC STABILITY: INCOME INSECURITY: IN THE LAST 12 MONTHS, WAS THERE A TIME WHEN YOU WERE NOT ABLE TO PAY THE MORTGAGE OR RENT ON TIME?: NO

## 2025-01-06 SDOH — ECONOMIC STABILITY: INCOME INSECURITY: HOW HARD IS IT FOR YOU TO PAY FOR THE VERY BASICS LIKE FOOD, HOUSING, MEDICAL CARE, AND HEATING?: NOT HARD AT ALL

## 2025-01-06 SDOH — ECONOMIC STABILITY: FOOD INSECURITY: WITHIN THE PAST 12 MONTHS, THE FOOD YOU BOUGHT JUST DIDN'T LAST AND YOU DIDN'T HAVE MONEY TO GET MORE.: NEVER TRUE

## 2025-01-06 ASSESSMENT — SOCIAL DETERMINANTS OF HEALTH (SDOH)
HOW OFTEN DO YOU ATTENT MEETINGS OF THE CLUB OR ORGANIZATION YOU BELONG TO?: NEVER
HOW OFTEN DO YOU HAVE SIX OR MORE DRINKS ON ONE OCCASION: NEVER
DO YOU BELONG TO ANY CLUBS OR ORGANIZATIONS SUCH AS CHURCH GROUPS UNIONS, FRATERNAL OR ATHLETIC GROUPS, OR SCHOOL GROUPS?: NO
DO YOU BELONG TO ANY CLUBS OR ORGANIZATIONS SUCH AS CHURCH GROUPS UNIONS, FRATERNAL OR ATHLETIC GROUPS, OR SCHOOL GROUPS?: NO
WITHIN THE PAST 12 MONTHS, YOU WORRIED THAT YOUR FOOD WOULD RUN OUT BEFORE YOU GOT THE MONEY TO BUY MORE: NEVER TRUE
IN A TYPICAL WEEK, HOW MANY TIMES DO YOU TALK ON THE PHONE WITH FAMILY, FRIENDS, OR NEIGHBORS?: THREE TIMES A WEEK
HOW OFTEN DO YOU GET TOGETHER WITH FRIENDS OR RELATIVES?: PATIENT DECLINED
HOW OFTEN DO YOU ATTEND CHURCH OR RELIGIOUS SERVICES?: PATIENT DECLINED
HOW OFTEN DO YOU ATTEND CHURCH OR RELIGIOUS SERVICES?: PATIENT DECLINED
IN A TYPICAL WEEK, HOW MANY TIMES DO YOU TALK ON THE PHONE WITH FAMILY, FRIENDS, OR NEIGHBORS?: THREE TIMES A WEEK
HOW OFTEN DO YOU HAVE A DRINK CONTAINING ALCOHOL: NEVER
HOW OFTEN DO YOU GET TOGETHER WITH FRIENDS OR RELATIVES?: PATIENT DECLINED
IN THE PAST 12 MONTHS, HAS THE ELECTRIC, GAS, OIL, OR WATER COMPANY THREATENED TO SHUT OFF SERVICE IN YOUR HOME?: NO
HOW MANY DRINKS CONTAINING ALCOHOL DO YOU HAVE ON A TYPICAL DAY WHEN YOU ARE DRINKING: PATIENT DOES NOT DRINK
HOW HARD IS IT FOR YOU TO PAY FOR THE VERY BASICS LIKE FOOD, HOUSING, MEDICAL CARE, AND HEATING?: NOT HARD AT ALL
HOW OFTEN DO YOU ATTENT MEETINGS OF THE CLUB OR ORGANIZATION YOU BELONG TO?: NEVER

## 2025-01-06 ASSESSMENT — LIFESTYLE VARIABLES
AUDIT-C TOTAL SCORE: 0
HOW MANY STANDARD DRINKS CONTAINING ALCOHOL DO YOU HAVE ON A TYPICAL DAY: PATIENT DOES NOT DRINK
HOW OFTEN DO YOU HAVE SIX OR MORE DRINKS ON ONE OCCASION: NEVER
HOW OFTEN DO YOU HAVE A DRINK CONTAINING ALCOHOL: NEVER
SKIP TO QUESTIONS 9-10: 1

## 2025-01-07 ENCOUNTER — OFFICE VISIT (OUTPATIENT)
Dept: MEDICAL GROUP | Age: 80
End: 2025-01-07
Payer: MEDICARE

## 2025-01-07 VITALS
HEART RATE: 71 BPM | WEIGHT: 138 LBS | DIASTOLIC BLOOD PRESSURE: 64 MMHG | BODY MASS INDEX: 25.4 KG/M2 | SYSTOLIC BLOOD PRESSURE: 122 MMHG | HEIGHT: 62 IN | OXYGEN SATURATION: 97 % | TEMPERATURE: 97.3 F

## 2025-01-07 DIAGNOSIS — I10 PRIMARY HYPERTENSION: ICD-10-CM

## 2025-01-07 DIAGNOSIS — E11.65 TYPE 2 DIABETES MELLITUS WITH HYPERGLYCEMIA, WITHOUT LONG-TERM CURRENT USE OF INSULIN (HCC): ICD-10-CM

## 2025-01-07 DIAGNOSIS — E78.2 MIXED DYSLIPIDEMIA: ICD-10-CM

## 2025-01-07 PROCEDURE — 3078F DIAST BP <80 MM HG: CPT | Performed by: STUDENT IN AN ORGANIZED HEALTH CARE EDUCATION/TRAINING PROGRAM

## 2025-01-07 PROCEDURE — 99214 OFFICE O/P EST MOD 30 MIN: CPT | Performed by: STUDENT IN AN ORGANIZED HEALTH CARE EDUCATION/TRAINING PROGRAM

## 2025-01-07 PROCEDURE — 3074F SYST BP LT 130 MM HG: CPT | Performed by: STUDENT IN AN ORGANIZED HEALTH CARE EDUCATION/TRAINING PROGRAM

## 2025-01-07 RX ORDER — ATORVASTATIN CALCIUM 20 MG/1
20 TABLET, FILM COATED ORAL NIGHTLY
Qty: 30 TABLET | Refills: 3 | Status: SHIPPED | OUTPATIENT
Start: 2025-01-07

## 2025-01-07 ASSESSMENT — PATIENT HEALTH QUESTIONNAIRE - PHQ9: CLINICAL INTERPRETATION OF PHQ2 SCORE: 0

## 2025-01-07 ASSESSMENT — ENCOUNTER SYMPTOMS
SENSORY CHANGE: 0
ABDOMINAL PAIN: 0
SHORTNESS OF BREATH: 0
DOUBLE VISION: 0
BACK PAIN: 0
FEVER: 0
WEAKNESS: 0
CHILLS: 0
BLOOD IN STOOL: 0
BLURRED VISION: 0
BRUISES/BLEEDS EASILY: 0
DIZZINESS: 1
PALPITATIONS: 0
HEADACHES: 0
DEPRESSION: 0
COUGH: 0
ORTHOPNEA: 0
WHEEZING: 0

## 2025-01-07 ASSESSMENT — FIBROSIS 4 INDEX: FIB4 SCORE: 1.54

## 2025-01-07 NOTE — PROGRESS NOTES
Subjective:     CC: Establish care    HPI:   History of Present Illness  The patient is a 79-year-old female presenting to establish care.    She has been experiencing an increased frequency of lightheadedness since the previous year, particularly upon awakening in the morning. She does not experience any vertigo. Her blood pressure readings have been inconsistent, with a recent reading of 120 systolic. She is currently on lisinopril for hypertension management and utilizes a wrist drop device for home monitoring.    She was previously on lovastatin for hypercholesterolemia but discontinued it due to adverse effects including myalgia and fatigue. During her last consultation with her nurse practitioner in 11/2024, she was prescribed Crestor 5 mg. However, she did not fill the prescription due to its high cost and is currently not on any cholesterol-lowering medication.    Her metformin dosage was increased from 500 mg once daily to 1000 mg in the evening following a diabetic screening that revealed an elevated A1c level of 7.0%. This adjustment resulted in gastrointestinal side effects including diarrhea and nausea. She is considering splitting the dose to 500 mg twice daily.    She is on omeprazole for heartburn and acid reflux. She was advised to take it for 14 days and then discontinue for a few months. However, upon discontinuation, her heartburn and acid reflux symptoms return.    She has a known cardiac murmur and underwent an echocardiogram in 08/2023 prior to eye surgery.    MEDICATIONS  Current: lisinopril, metformin, omeprazole  Discontinued: lovastatin       ROS:  Review of Systems   Constitutional:  Negative for chills, fever and malaise/fatigue.   HENT:  Negative for nosebleeds and tinnitus.    Eyes:  Negative for blurred vision and double vision.   Respiratory:  Negative for cough, shortness of breath and wheezing.    Cardiovascular:  Negative for chest pain, palpitations, orthopnea and leg swelling.  "  Gastrointestinal:  Negative for abdominal pain, blood in stool and melena.   Genitourinary:  Negative for dysuria and urgency.   Musculoskeletal:  Negative for back pain and joint pain.   Skin:  Negative for rash.   Neurological:  Positive for dizziness. Negative for sensory change, weakness and headaches.   Endo/Heme/Allergies:  Does not bruise/bleed easily.   Psychiatric/Behavioral:  Negative for depression and suicidal ideas.        Objective:     Exam:  /64 (BP Location: Left arm, Patient Position: Sitting, BP Cuff Size: Adult)   Pulse 71   Temp 36.3 °C (97.3 °F) (Temporal)   Ht 1.575 m (5' 2\")   Wt 62.6 kg (138 lb)   SpO2 97%   BMI 25.24 kg/m²  Body mass index is 25.24 kg/m².    Physical Exam  Vitals reviewed.   Constitutional:       General: She is not in acute distress.  HENT:      Head: Normocephalic and atraumatic.      Mouth/Throat:      Mouth: Mucous membranes are moist.   Eyes:      General: No scleral icterus.     Extraocular Movements: Extraocular movements intact.      Pupils: Pupils are equal, round, and reactive to light.   Cardiovascular:      Rate and Rhythm: Normal rate and regular rhythm.      Pulses: Normal pulses.      Heart sounds: Normal heart sounds. No murmur heard.  Pulmonary:      Effort: Pulmonary effort is normal. No respiratory distress.      Breath sounds: Normal breath sounds. No wheezing.   Abdominal:      General: There is no distension.      Palpations: Abdomen is soft.      Tenderness: There is no abdominal tenderness. There is no guarding or rebound.   Musculoskeletal:      Cervical back: Normal range of motion and neck supple.      Right lower leg: No edema.      Left lower leg: No edema.   Skin:     General: Skin is warm.      Capillary Refill: Capillary refill takes less than 2 seconds.      Coloration: Skin is not jaundiced.   Neurological:      General: No focal deficit present.      Mental Status: She is alert.      Cranial Nerves: No cranial nerve deficit. "      Sensory: No sensory deficit.   Psychiatric:         Mood and Affect: Mood normal.         Behavior: Behavior normal.       Labs: Reviewed    Assessment & Plan:     79 y.o. female with the following -     1. Type 2 diabetes mellitus with hyperglycemia, without long-term current use of insulin (HCC)  Her A1c level is currently at 7.0%. She will revert to her previous metformin dosage of 500 mg twice daily due to side effects of diarrhea and nausea. Her A1c levels will be reassessed in either 02/2025 or 03/2025.    2. Mixed dyslipidemia  Her cholesterol levels remain elevated. She will be initiated on a low dose of Lipitor. The prescription will be sent to her pharmacy, [facility].  She was unable to afford Crestor prescription.  - atorvastatin (LIPITOR) 20 MG Tab; Take 1 Tablet by mouth every evening.  Dispense: 30 Tablet; Refill: 3    3. Primary hypertension  -Chronic, unclear if well controlled  -Blood pressure today at our office was normal 122/64, however the patient is not monitoring her blood pressure at home at all.  -She is currently taking lisinopril 10 mg daily.  -Recommended to monitor her blood pressure at home daily and follow-up with me in 4 weeks.    Lightheadedness.  The etiology of the lightheadedness could potentially be attributed to a decrease in blood pressure. She has been advised to monitor her blood pressure at home consistently, particularly during episodes of lightheadedness, to ascertain if there is a correlation with hypotension. If her blood pressure drops, adjustments to her current medication, lisinopril, may be necessary.         HCC Gap Form    Diagnosis to address: E11.65 - Type 2 diabetes mellitus with hyperglycemia, without long-term current use of insulin (HCC)  Assessment and plan: Chronic, stable. Continue with current defined treatment plan: Continue same therapy. Follow-up at least annually.  Last edited 01/07/25 09:40 PST by Doug Tijerina M.D.         Return in about 4  weeks (around 2/4/2025) for Meds.    Please note that this dictation was created using voice recognition software. I have made every reasonable attempt to correct obvious errors, but I expect that there are errors of grammar and possibly content that I did not discover before finalizing the note.

## 2025-01-07 NOTE — PATIENT INSTRUCTIONS
-Decrease metformin dose to 500 mg daily  -Start Lipitor 20 mg daily  -Continue other meds same dose  -Monitor BP daily  -Follow up in 4 weeks

## 2025-01-09 ENCOUNTER — APPOINTMENT (OUTPATIENT)
Dept: FAMILY PLANNING/WOMEN'S HEALTH CLINIC | Facility: PHYSICIAN GROUP | Age: 80
End: 2025-01-09
Payer: MEDICARE

## 2025-01-09 DIAGNOSIS — E11.65 TYPE 2 DIABETES MELLITUS WITH HYPERGLYCEMIA, WITHOUT LONG-TERM CURRENT USE OF INSULIN (HCC): ICD-10-CM

## 2025-01-09 DIAGNOSIS — M81.0 AGE-RELATED OSTEOPOROSIS WITHOUT CURRENT PATHOLOGICAL FRACTURE: ICD-10-CM

## 2025-01-09 DIAGNOSIS — I10 PRIMARY HYPERTENSION: ICD-10-CM

## 2025-01-09 DIAGNOSIS — E78.00 HYPERCHOLESTEREMIA: ICD-10-CM

## 2025-02-04 ENCOUNTER — APPOINTMENT (OUTPATIENT)
Dept: MEDICAL GROUP | Age: 80
End: 2025-02-04
Payer: MEDICARE

## 2025-02-04 VITALS
SYSTOLIC BLOOD PRESSURE: 133 MMHG | BODY MASS INDEX: 26.13 KG/M2 | TEMPERATURE: 97.3 F | HEIGHT: 62 IN | HEART RATE: 63 BPM | WEIGHT: 142 LBS | OXYGEN SATURATION: 97 % | DIASTOLIC BLOOD PRESSURE: 64 MMHG

## 2025-02-04 DIAGNOSIS — E55.9 VITAMIN D DEFICIENCY: ICD-10-CM

## 2025-02-04 DIAGNOSIS — E78.2 MIXED DYSLIPIDEMIA: ICD-10-CM

## 2025-02-04 DIAGNOSIS — I10 PRIMARY HYPERTENSION: ICD-10-CM

## 2025-02-04 DIAGNOSIS — E11.65 TYPE 2 DIABETES MELLITUS WITH HYPERGLYCEMIA, WITHOUT LONG-TERM CURRENT USE OF INSULIN (HCC): ICD-10-CM

## 2025-02-04 DIAGNOSIS — M81.0 AGE-RELATED OSTEOPOROSIS WITHOUT CURRENT PATHOLOGICAL FRACTURE: ICD-10-CM

## 2025-02-04 DIAGNOSIS — Z78.0 POST-MENOPAUSAL: ICD-10-CM

## 2025-02-04 PROCEDURE — 3075F SYST BP GE 130 - 139MM HG: CPT | Performed by: STUDENT IN AN ORGANIZED HEALTH CARE EDUCATION/TRAINING PROGRAM

## 2025-02-04 PROCEDURE — 3078F DIAST BP <80 MM HG: CPT | Performed by: STUDENT IN AN ORGANIZED HEALTH CARE EDUCATION/TRAINING PROGRAM

## 2025-02-04 PROCEDURE — 99214 OFFICE O/P EST MOD 30 MIN: CPT | Performed by: STUDENT IN AN ORGANIZED HEALTH CARE EDUCATION/TRAINING PROGRAM

## 2025-02-04 RX ORDER — ATORVASTATIN CALCIUM 20 MG/1
20 TABLET, FILM COATED ORAL NIGHTLY
Qty: 90 TABLET | Refills: 3 | Status: SHIPPED | OUTPATIENT
Start: 2025-02-04

## 2025-02-04 RX ORDER — ATORVASTATIN CALCIUM 20 MG/1
20 TABLET, FILM COATED ORAL NIGHTLY
Qty: 90 TABLET | Refills: 3 | OUTPATIENT
Start: 2025-02-04

## 2025-02-04 ASSESSMENT — ENCOUNTER SYMPTOMS
PALPITATIONS: 0
FEVER: 0
DOUBLE VISION: 0
ABDOMINAL PAIN: 0
WHEEZING: 0
ORTHOPNEA: 0
BLURRED VISION: 0
HEADACHES: 0
DIZZINESS: 0
BLOOD IN STOOL: 0
BRUISES/BLEEDS EASILY: 0
CHILLS: 0
SHORTNESS OF BREATH: 0
DEPRESSION: 0
WEAKNESS: 0
BACK PAIN: 0
COUGH: 0

## 2025-02-04 ASSESSMENT — FIBROSIS 4 INDEX: FIB4 SCORE: 1.54

## 2025-02-04 NOTE — PROGRESS NOTES
Subjective:     CC: Blood Pressure follow-up    HPI:   History of Present Illness  The patient is a 79-year-old female presenting for a diabetes follow-up.    She has been diligently monitoring her blood pressure, as per previous instructions. She reports an unusual pattern over the past two days, with readings fluctuating between 102/50 and 140s. Her most recent reading this morning was within normal limits, while yesterday's reading was 127/75.  However she also had some blood pressure readings in the low 150s.  She typically measures her blood pressure before meals and occasionally during midday. She expresses concern about the potential impact of her medications, including statins and metformin, on her blood pressure.    She also inquires about the comparative efficacy of atorvastatin and lovastatin, as she has exhausted her supply of the former but has a surplus of the latter.    It has been a while since she had her DEXA scan.    MEDICATIONS  Current: Metformin, atorvastatin, lisinopril.  Past: Lovastatin.       ROS:  Review of Systems   Constitutional:  Negative for chills, fever and malaise/fatigue.   HENT:  Negative for nosebleeds and tinnitus.    Eyes:  Negative for blurred vision and double vision.   Respiratory:  Negative for cough, shortness of breath and wheezing.    Cardiovascular:  Negative for chest pain, palpitations, orthopnea and leg swelling.   Gastrointestinal:  Negative for abdominal pain, blood in stool and melena.   Genitourinary:  Negative for dysuria and urgency.   Musculoskeletal:  Negative for back pain and joint pain.   Skin:  Negative for rash.   Neurological:  Negative for dizziness, weakness and headaches.   Endo/Heme/Allergies:  Does not bruise/bleed easily.   Psychiatric/Behavioral:  Negative for depression and suicidal ideas.        Objective:     Exam:  /64 (BP Location: Right arm, Patient Position: Sitting, BP Cuff Size: Adult)   Pulse 63   Temp 36.3 °C (97.3 °F)  "(Temporal)   Ht 1.575 m (5' 2\")   Wt 64.4 kg (142 lb)   SpO2 97%   BMI 25.97 kg/m²  Body mass index is 25.97 kg/m².    Physical Exam  Vitals reviewed.   Constitutional:       General: She is not in acute distress.  HENT:      Head: Normocephalic and atraumatic.      Mouth/Throat:      Mouth: Mucous membranes are moist.   Eyes:      General: No scleral icterus.     Extraocular Movements: Extraocular movements intact.      Pupils: Pupils are equal, round, and reactive to light.   Cardiovascular:      Rate and Rhythm: Normal rate and regular rhythm.      Pulses: Normal pulses.      Heart sounds: Normal heart sounds. No murmur heard.  Pulmonary:      Effort: Pulmonary effort is normal. No respiratory distress.      Breath sounds: Normal breath sounds. No wheezing.   Abdominal:      General: There is no distension.      Palpations: Abdomen is soft.      Tenderness: There is no abdominal tenderness. There is no guarding or rebound.   Musculoskeletal:      Cervical back: Normal range of motion and neck supple.      Right lower leg: No edema.      Left lower leg: No edema.   Skin:     General: Skin is warm.      Capillary Refill: Capillary refill takes less than 2 seconds.      Coloration: Skin is not jaundiced.      Findings: No bruising.   Neurological:      General: No focal deficit present.      Mental Status: She is alert.      Cranial Nerves: No cranial nerve deficit.      Sensory: No sensory deficit.   Psychiatric:         Mood and Affect: Mood normal.         Behavior: Behavior normal.       Labs: Ordered    Assessment & Plan:     79 y.o. female with the following -     1. Primary hypertension  Her blood pressure readings have been inconsistent, with some readings slightly above the target range of 130/80 mmHg. She has been advised to monitor her blood pressure at the same time each day, preferably in the evening or afternoon after lunch, to ensure more reliable readings. She has been instructed on the correct " method of measuring blood pressure. She has been advised to continue her current antihypertensive medication regimen without any changes.      2. Type 2 diabetes mellitus with hyperglycemia, without long-term current use of insulin (HCC)  Her A1c level is currently at 7%. She has been advised to continue her current medication regimen for diabetes without any changes. A foot examination will be conducted today. An eye examination will be scheduled to check for diabetic retinopathy. Fasting blood work will be ordered to be completed one week prior to her next visit.    - HEMOGLOBIN A1C; Future  - MICROALBUMIN CREAT RATIO URINE; Future  - VITAMIN B12; Future  - Diabetic Monofilament LE Exam  - POCT Retinal Eye Exam    3. Mixed dyslipidemia  A prescription refill for atorvastatin 20 mg has been provided and sent to the [facility] pharmacy. She has been advised that atorvastatin is more effective than lovastatin and should continue with atorvastatin as prescribed.  - atorvastatin (LIPITOR) 20 MG Tab; Take 1 Tablet by mouth every evening.  Dispense: 90 Tablet; Refill: 3  - Comp Metabolic Panel; Future  - Lipid Profile; Future    4. Age-related osteoporosis without current pathological fracture  5. Post-menopausal  A repeat DEXA scan has been ordered to assess her bone density. She has been instructed to call and schedule the scan.  - DS-BONE DENSITY STUDY (DEXA); Future    6. Vitamin D deficiency  -History of deficiency, currently on supplementation    - VITAMIN D,25 HYDROXY (DEFICIENCY); Future       Return in about 3 months (around 5/4/2025) for DM, Labs.    Please note that this dictation was created using voice recognition software. I have made every reasonable attempt to correct obvious errors, but I expect that there are errors of grammar and possibly content that I did not discover before finalizing the note.

## 2025-02-05 ENCOUNTER — PATIENT MESSAGE (OUTPATIENT)
Dept: MEDICAL GROUP | Age: 80
End: 2025-02-05
Payer: MEDICARE

## 2025-02-05 RX ORDER — LISINOPRIL 10 MG/1
10 TABLET ORAL DAILY
Qty: 90 TABLET | Refills: 3 | Status: SHIPPED | OUTPATIENT
Start: 2025-02-05 | End: 2025-02-07 | Stop reason: SDUPTHER

## 2025-02-09 RX ORDER — LISINOPRIL 10 MG/1
10 TABLET ORAL DAILY
Qty: 90 TABLET | Refills: 3 | Status: SHIPPED | OUTPATIENT
Start: 2025-02-09 | End: 2025-02-11 | Stop reason: SDUPTHER

## 2025-02-11 RX ORDER — LISINOPRIL 10 MG/1
10 TABLET ORAL DAILY
Qty: 90 TABLET | Refills: 3 | Status: SHIPPED | OUTPATIENT
Start: 2025-02-11

## 2025-02-11 RX ORDER — LISINOPRIL 10 MG/1
10 TABLET ORAL DAILY
Qty: 90 TABLET | Refills: 3 | Status: CANCELLED | OUTPATIENT
Start: 2025-02-11

## 2025-02-11 NOTE — TELEPHONE ENCOUNTER
Received request via: Pharmacy    Was the patient seen in the last year in this department? Yes    Does the patient have an active prescription (recently filled or refills available) for medication(s) requested? No    Pharmacy Name: [facility]     Does the patient have [facility] Plus and need 100-day supply? (This applies to ALL medications) Yes, quantity updated to 100 days

## 2025-02-12 ENCOUNTER — PATIENT MESSAGE (OUTPATIENT)
Dept: MEDICAL GROUP | Age: 80
End: 2025-02-12
Payer: MEDICARE

## 2025-02-12 DIAGNOSIS — E11.65 TYPE 2 DIABETES MELLITUS WITH HYPERGLYCEMIA, WITHOUT LONG-TERM CURRENT USE OF INSULIN (HCC): ICD-10-CM

## 2025-02-12 RX ORDER — METFORMIN HYDROCHLORIDE 500 MG/1
1000 TABLET, EXTENDED RELEASE ORAL DAILY
Qty: 180 TABLET | Refills: 3 | Status: SHIPPED | OUTPATIENT
Start: 2025-02-12

## 2025-03-04 ENCOUNTER — HOSPITAL ENCOUNTER (OUTPATIENT)
Dept: LAB | Facility: MEDICAL CENTER | Age: 80
End: 2025-03-04
Attending: STUDENT IN AN ORGANIZED HEALTH CARE EDUCATION/TRAINING PROGRAM
Payer: MEDICARE

## 2025-03-04 DIAGNOSIS — E11.65 TYPE 2 DIABETES MELLITUS WITH HYPERGLYCEMIA, WITHOUT LONG-TERM CURRENT USE OF INSULIN (HCC): ICD-10-CM

## 2025-03-04 LAB
EST. AVERAGE GLUCOSE BLD GHB EST-MCNC: 154 MG/DL
HBA1C MFR BLD: 7 % (ref 4–5.6)

## 2025-03-04 PROCEDURE — 36415 COLL VENOUS BLD VENIPUNCTURE: CPT

## 2025-03-04 PROCEDURE — 83036 HEMOGLOBIN GLYCOSYLATED A1C: CPT

## 2025-03-05 ENCOUNTER — APPOINTMENT (OUTPATIENT)
Dept: MEDICAL GROUP | Facility: MEDICAL CENTER | Age: 80
End: 2025-03-05
Payer: MEDICARE

## 2025-03-24 ENCOUNTER — APPOINTMENT (OUTPATIENT)
Dept: MEDICAL GROUP | Age: 80
End: 2025-03-24
Payer: MEDICARE

## 2025-03-24 ENCOUNTER — PATIENT MESSAGE (OUTPATIENT)
Dept: HEALTH INFORMATION MANAGEMENT | Facility: OTHER | Age: 80
End: 2025-03-24

## 2025-04-28 ENCOUNTER — TELEPHONE (OUTPATIENT)
Dept: HEALTH INFORMATION MANAGEMENT | Facility: OTHER | Age: 80
End: 2025-04-28
Payer: MEDICARE

## 2025-05-01 ENCOUNTER — HOSPITAL ENCOUNTER (OUTPATIENT)
Dept: LAB | Facility: MEDICAL CENTER | Age: 80
End: 2025-05-01
Attending: STUDENT IN AN ORGANIZED HEALTH CARE EDUCATION/TRAINING PROGRAM
Payer: MEDICARE

## 2025-05-01 DIAGNOSIS — E55.9 VITAMIN D DEFICIENCY: ICD-10-CM

## 2025-05-01 DIAGNOSIS — E78.2 MIXED DYSLIPIDEMIA: ICD-10-CM

## 2025-05-01 DIAGNOSIS — E11.65 TYPE 2 DIABETES MELLITUS WITH HYPERGLYCEMIA, WITHOUT LONG-TERM CURRENT USE OF INSULIN (HCC): ICD-10-CM

## 2025-05-01 LAB
25(OH)D3 SERPL-MCNC: 42 NG/ML (ref 30–100)
ALBUMIN SERPL BCP-MCNC: 4.1 G/DL (ref 3.2–4.9)
ALBUMIN/GLOB SERPL: 1.2 G/DL
ALP SERPL-CCNC: 71 U/L (ref 30–99)
ALT SERPL-CCNC: 14 U/L (ref 2–50)
ANION GAP SERPL CALC-SCNC: 13 MMOL/L (ref 7–16)
AST SERPL-CCNC: 23 U/L (ref 12–45)
BILIRUB SERPL-MCNC: 0.6 MG/DL (ref 0.1–1.5)
BUN SERPL-MCNC: 14 MG/DL (ref 8–22)
CALCIUM ALBUM COR SERPL-MCNC: 9.3 MG/DL (ref 8.5–10.5)
CALCIUM SERPL-MCNC: 9.4 MG/DL (ref 8.5–10.5)
CHLORIDE SERPL-SCNC: 102 MMOL/L (ref 96–112)
CHOLEST SERPL-MCNC: 142 MG/DL (ref 100–199)
CO2 SERPL-SCNC: 21 MMOL/L (ref 20–33)
CREAT SERPL-MCNC: 0.85 MG/DL (ref 0.5–1.4)
CREAT UR-MCNC: 198 MG/DL
FASTING STATUS PATIENT QL REPORTED: NORMAL
GFR SERPLBLD CREATININE-BSD FMLA CKD-EPI: 70 ML/MIN/1.73 M 2
GLOBULIN SER CALC-MCNC: 3.4 G/DL (ref 1.9–3.5)
GLUCOSE SERPL-MCNC: 149 MG/DL (ref 65–99)
HDLC SERPL-MCNC: 57 MG/DL
LDLC SERPL CALC-MCNC: 66 MG/DL
MICROALBUMIN UR-MCNC: 6.7 MG/DL
MICROALBUMIN/CREAT UR: 34 MG/G (ref 0–30)
POTASSIUM SERPL-SCNC: 4.1 MMOL/L (ref 3.6–5.5)
PROT SERPL-MCNC: 7.5 G/DL (ref 6–8.2)
SODIUM SERPL-SCNC: 136 MMOL/L (ref 135–145)
TRIGL SERPL-MCNC: 96 MG/DL (ref 0–149)
VIT B12 SERPL-MCNC: 1237 PG/ML (ref 211–911)

## 2025-05-01 PROCEDURE — 36415 COLL VENOUS BLD VENIPUNCTURE: CPT

## 2025-05-01 PROCEDURE — 82607 VITAMIN B-12: CPT

## 2025-05-01 PROCEDURE — 80061 LIPID PANEL: CPT

## 2025-05-01 PROCEDURE — 82043 UR ALBUMIN QUANTITATIVE: CPT

## 2025-05-01 PROCEDURE — 82570 ASSAY OF URINE CREATININE: CPT

## 2025-05-01 PROCEDURE — 82306 VITAMIN D 25 HYDROXY: CPT

## 2025-05-01 PROCEDURE — 80053 COMPREHEN METABOLIC PANEL: CPT

## 2025-05-08 ENCOUNTER — OFFICE VISIT (OUTPATIENT)
Dept: MEDICAL GROUP | Age: 80
End: 2025-05-08
Payer: MEDICARE

## 2025-05-08 VITALS
WEIGHT: 143 LBS | HEIGHT: 62 IN | SYSTOLIC BLOOD PRESSURE: 116 MMHG | HEART RATE: 76 BPM | TEMPERATURE: 96.6 F | RESPIRATION RATE: 16 BRPM | DIASTOLIC BLOOD PRESSURE: 74 MMHG | OXYGEN SATURATION: 97 % | BODY MASS INDEX: 26.31 KG/M2

## 2025-05-08 DIAGNOSIS — I10 PRIMARY HYPERTENSION: ICD-10-CM

## 2025-05-08 DIAGNOSIS — E78.00 HYPERCHOLESTEREMIA: ICD-10-CM

## 2025-05-08 DIAGNOSIS — E11.65 TYPE 2 DIABETES MELLITUS WITH HYPERGLYCEMIA, WITHOUT LONG-TERM CURRENT USE OF INSULIN (HCC): ICD-10-CM

## 2025-05-08 DIAGNOSIS — R79.89 ELEVATED VITAMIN B12 LEVEL: ICD-10-CM

## 2025-05-08 PROCEDURE — 99214 OFFICE O/P EST MOD 30 MIN: CPT | Performed by: STUDENT IN AN ORGANIZED HEALTH CARE EDUCATION/TRAINING PROGRAM

## 2025-05-08 PROCEDURE — 3074F SYST BP LT 130 MM HG: CPT | Performed by: STUDENT IN AN ORGANIZED HEALTH CARE EDUCATION/TRAINING PROGRAM

## 2025-05-08 PROCEDURE — 3078F DIAST BP <80 MM HG: CPT | Performed by: STUDENT IN AN ORGANIZED HEALTH CARE EDUCATION/TRAINING PROGRAM

## 2025-05-08 ASSESSMENT — ENCOUNTER SYMPTOMS
PALPITATIONS: 0
SHORTNESS OF BREATH: 0
HEADACHES: 0
DEPRESSION: 0
ABDOMINAL PAIN: 0
FEVER: 0
SENSORY CHANGE: 0
WEAKNESS: 0
WHEEZING: 0
BLURRED VISION: 0
ORTHOPNEA: 0
BLOOD IN STOOL: 0
BRUISES/BLEEDS EASILY: 0
DOUBLE VISION: 0
BACK PAIN: 0
COUGH: 0
CHILLS: 0
DIZZINESS: 0

## 2025-05-08 ASSESSMENT — FIBROSIS 4 INDEX: FIB4 SCORE: 1.67

## 2025-05-08 ASSESSMENT — LIFESTYLE VARIABLES: SUBSTANCE_ABUSE: 0

## 2025-05-08 NOTE — PROGRESS NOTES
Subjective:     CC: Lab review follow-up    HPI:   History of Present Illness  The patient is a 79-year-old female presenting for a diabetes follow-up.    Her most recent A1c is 7.0%, which is considered acceptable. Cholesterol levels are within the normal range, indicating effective management with her current medication. A urine exam revealed a very mild increase in protein, which is not alarming but will continue to be monitored every six months. Her vitamin B12 levels are high, and she has been advised to discontinue the supplement. Vitamin D levels are within the normal range, and she continues to take vitamin D supplements.    She reports that her blood pressure readings at home fluctuate, with systolic values ranging from the 140s to as low as 115. Blood pressure readings are normal during office visits.    She expresses concern about persistent watery eyes and questions whether this is a common symptom in the elderly population.    She received the influenza vaccine in August last year.       ROS:  Review of Systems   Constitutional:  Negative for chills, fever and malaise/fatigue.   HENT:  Negative for nosebleeds and tinnitus.    Eyes:  Negative for blurred vision and double vision.   Respiratory:  Negative for cough, shortness of breath and wheezing.    Cardiovascular:  Negative for chest pain, palpitations, orthopnea and leg swelling.   Gastrointestinal:  Negative for abdominal pain, blood in stool and melena.   Genitourinary:  Negative for dysuria and urgency.   Musculoskeletal:  Negative for back pain and joint pain.   Skin:  Negative for rash.   Neurological:  Negative for dizziness, sensory change, weakness and headaches.   Endo/Heme/Allergies:  Does not bruise/bleed easily.   Psychiatric/Behavioral:  Negative for depression, substance abuse and suicidal ideas.        Objective:     Exam:  /74 (BP Location: Left arm, Patient Position: Sitting, BP Cuff Size: Adult)   Pulse 76   Temp 35.9 °C  "(96.6 °F) (Temporal)   Resp 16   Ht 1.58 m (5' 2.21\")   Wt 64.9 kg (143 lb)   SpO2 97%   BMI 25.98 kg/m²  Body mass index is 25.98 kg/m².    Physical Exam  Vitals reviewed.   Constitutional:       General: She is not in acute distress.  HENT:      Head: Normocephalic and atraumatic.      Mouth/Throat:      Mouth: Mucous membranes are moist.   Eyes:      General: No scleral icterus.     Extraocular Movements: Extraocular movements intact.      Pupils: Pupils are equal, round, and reactive to light.   Cardiovascular:      Rate and Rhythm: Normal rate and regular rhythm.      Pulses: Normal pulses.      Heart sounds: Normal heart sounds. No murmur heard.  Pulmonary:      Effort: Pulmonary effort is normal. No respiratory distress.      Breath sounds: Normal breath sounds. No wheezing.   Abdominal:      General: There is no distension.      Palpations: Abdomen is soft.      Tenderness: There is no abdominal tenderness. There is no guarding or rebound.   Musculoskeletal:      Cervical back: Normal range of motion and neck supple.      Right lower leg: No edema.      Left lower leg: No edema.   Skin:     General: Skin is warm.      Capillary Refill: Capillary refill takes less than 2 seconds.      Coloration: Skin is not jaundiced.      Findings: No bruising.   Neurological:      General: No focal deficit present.      Mental Status: She is alert.      Cranial Nerves: No cranial nerve deficit.      Sensory: No sensory deficit.   Psychiatric:         Mood and Affect: Mood normal.         Behavior: Behavior normal.       Labs: Reviewed    Assessment & Plan:       1. Type 2 diabetes mellitus with hyperglycemia, without long-term current use of insulin (HCC)  - Most recent A1c level is 7.0%, which is considered acceptable.  -Current therapy with metformin 1000 mg daily  -Continue current therapy  - Cholesterol levels are completely normal.  - Urine examination shows very mild proteinuria, slightly above the normal " range.  - A1c test will be conducted during her next office visit.    Proteinuria.  - Urine examination revealed a slight presence of protein, mildly above the normal range.  - Condition is not alarming but will be monitored every 6 months.  - Patient advised that proteinuria can be seen with diabetes.  - Regular monitoring of urine protein levels recommended.    2. Hypercholesteremia  - Cholesterol levels are at goal, indicating effective management with atorvastatin.  - Current medication, atorvastatin, is working well.  - Patient advised to continue taking atorvastatin.    3. Primary hypertension  - Blood pressure is currently normal in the office.  - Patient reports variability in blood pressure readings at home, ranging from 115 to 140.  - Advised that readings of 130/80 and better are excellent.  - Patient advised to bring her blood pressure cuff to the office for calibration during her next visit.      4. Elevated vitamin B12 level  - Vitamin B12 levels are elevated.  - Patient advised to discontinue vitamin B12 supplements.  - Dietary intake should be sufficient for maintaining normal B12 levels.  - Patient agreed to stop taking B12 supplements.     Health Maintenance.  - Received influenza vaccine in August of last year.  - Advised to get the next influenza vaccine around September or October of this year.  - Patient agreed to wait until September or October for the next flu shot.        Return in about 4 months (around 9/8/2025) for DM.    Please note that this dictation was created using voice recognition software. I have made every reasonable attempt to correct obvious errors, but I expect that there are errors of grammar and possibly content that I did not discover before finalizing the note.

## 2025-05-09 DIAGNOSIS — E11.65 TYPE 2 DIABETES MELLITUS WITH HYPERGLYCEMIA, WITHOUT LONG-TERM CURRENT USE OF INSULIN (HCC): ICD-10-CM

## 2025-05-09 NOTE — TELEPHONE ENCOUNTER
Received request via: Patient    Was the patient seen in the last year in this department? Yes    Does the patient have an active prescription (recently filled or refills available) for medication(s) requested?  YES    Pharmacy Name: [facility]    Does the patient have snf Plus and need 100-day supply? (This applies to ALL medications) Yes, quantity updated to 100 days

## 2025-05-10 RX ORDER — METFORMIN HYDROCHLORIDE 500 MG/1
1000 TABLET, EXTENDED RELEASE ORAL DAILY
Qty: 180 TABLET | Refills: 3 | Status: SHIPPED | OUTPATIENT
Start: 2025-05-10

## 2025-05-13 DIAGNOSIS — E78.2 MIXED DYSLIPIDEMIA: ICD-10-CM

## 2025-05-13 RX ORDER — ATORVASTATIN CALCIUM 20 MG/1
20 TABLET, FILM COATED ORAL NIGHTLY
Qty: 90 TABLET | Refills: 3 | Status: SHIPPED | OUTPATIENT
Start: 2025-05-13

## 2025-05-13 NOTE — TELEPHONE ENCOUNTER
Received request via: Patient    Was the patient seen in the last year in this department? Yes    Does the patient have an active prescription (recently filled or refills available) for medication(s) requested?  YES    [facility]    Does the patient have snf Plus and need 100-day supply? (This applies to ALL medications) Yes, quantity updated to 100 days

## 2025-07-02 ENCOUNTER — TELEPHONE (OUTPATIENT)
Dept: HEALTH INFORMATION MANAGEMENT | Facility: OTHER | Age: 80
End: 2025-07-02
Payer: MEDICARE

## 2025-07-07 ASSESSMENT — ACTIVITIES OF DAILY LIVING (ADL): BATHING_REQUIRES_ASSISTANCE: 0

## 2025-07-07 ASSESSMENT — PATIENT HEALTH QUESTIONNAIRE - PHQ9
2. FEELING DOWN, DEPRESSED, IRRITABLE, OR HOPELESS: NOT AT ALL
1. LITTLE INTEREST OR PLEASURE IN DOING THINGS: NOT AT ALL

## 2025-07-07 ASSESSMENT — ENCOUNTER SYMPTOMS: GENERAL WELL-BEING: GOOD

## 2025-07-08 NOTE — ASSESSMENT & PLAN NOTE
Chronic, stable. Last A1c in March 2025 was 7.0. Currently takes metformin 500 mg daily. Was told to increase to 1000 mg daily but she reports she was getting dizzy and upset stomach so she stopped. Has not had retinal exam this year. Unable to complete in office today due to machine error. Last microalbumin creat ratio in May 2025 was 34. Her PCP does her feet exams. She is on a statin. We discussed diet and lifestyle habits in depth today. Follow up with PCP for continued monitoring and management.  Lab Results   Component Value Date/Time    URCREAT 198.00 05/01/2025 0732    MICROALBUR 6.7 05/01/2025 0732    MALBCRT 34 (H) 05/01/2025 0732

## 2025-07-08 NOTE — ASSESSMENT & PLAN NOTE
Chronic, Stable. BP in office today is 118/70. She does occasionally check her BP at home. She currently takes lisinopril 10 mg daily. Follow up with PCP for continued monitoring and management.

## 2025-07-08 NOTE — ASSESSMENT & PLAN NOTE
Chronic, stable. She currently takes atorvastatin 20 mg daily. We discussed her dietary/lifestyle regimen. Follow up with PCP for continued monitoring and management.  Lab Results   Component Value Date/Time    CHOLSTRLTOT 142 05/01/2025 07:33 AM    TRIGLYCERIDE 96 05/01/2025 07:33 AM    HDL 57 05/01/2025 07:33 AM    LDL 66 05/01/2025 07:33 AM

## 2025-07-08 NOTE — ASSESSMENT & PLAN NOTE
Chronic, stable. Last DEXA 2022 with new one scheduled. She does take calcium and vitamin D supplementation. Does engage in weightbearing exercise. No hx of fragility fractures. Continue to follow up with PCP as previously scheduled.

## 2025-07-09 ENCOUNTER — OFFICE VISIT (OUTPATIENT)
Dept: FAMILY PLANNING/WOMEN'S HEALTH CLINIC | Facility: PHYSICIAN GROUP | Age: 80
End: 2025-07-09
Payer: MEDICARE

## 2025-07-09 VITALS
HEIGHT: 62 IN | WEIGHT: 148 LBS | BODY MASS INDEX: 27.23 KG/M2 | SYSTOLIC BLOOD PRESSURE: 118 MMHG | DIASTOLIC BLOOD PRESSURE: 70 MMHG | OXYGEN SATURATION: 96 % | HEART RATE: 72 BPM

## 2025-07-09 DIAGNOSIS — E11.29 TYPE 2 DIABETES MELLITUS WITH DIABETIC MICROALBUMINURIA, WITHOUT LONG-TERM CURRENT USE OF INSULIN (HCC): Primary | ICD-10-CM

## 2025-07-09 DIAGNOSIS — E78.00 HYPERCHOLESTEREMIA: ICD-10-CM

## 2025-07-09 DIAGNOSIS — I10 PRIMARY HYPERTENSION: ICD-10-CM

## 2025-07-09 DIAGNOSIS — R80.9 TYPE 2 DIABETES MELLITUS WITH DIABETIC MICROALBUMINURIA, WITHOUT LONG-TERM CURRENT USE OF INSULIN (HCC): Primary | ICD-10-CM

## 2025-07-09 DIAGNOSIS — M81.0 AGE-RELATED OSTEOPOROSIS WITHOUT CURRENT PATHOLOGICAL FRACTURE: ICD-10-CM

## 2025-07-09 PROCEDURE — 1126F AMNT PAIN NOTED NONE PRSNT: CPT

## 2025-07-09 PROCEDURE — G0439 PPPS, SUBSEQ VISIT: HCPCS

## 2025-07-09 PROCEDURE — 3074F SYST BP LT 130 MM HG: CPT

## 2025-07-09 PROCEDURE — 3078F DIAST BP <80 MM HG: CPT

## 2025-07-09 RX ORDER — AMPICILLIN TRIHYDRATE 250 MG
CAPSULE ORAL
COMMUNITY

## 2025-07-09 SDOH — ECONOMIC STABILITY: FOOD INSECURITY: WITHIN THE PAST 12 MONTHS, THE FOOD YOU BOUGHT JUST DIDN'T LAST AND YOU DIDN'T HAVE MONEY TO GET MORE.: NEVER TRUE

## 2025-07-09 SDOH — ECONOMIC STABILITY: HOUSING INSECURITY: AT ANY TIME IN THE PAST 12 MONTHS, WERE YOU HOMELESS OR LIVING IN A SHELTER (INCLUDING NOW)?: NO

## 2025-07-09 SDOH — ECONOMIC STABILITY: HOUSING INSECURITY: IN THE LAST 12 MONTHS, WAS THERE A TIME WHEN YOU WERE NOT ABLE TO PAY THE MORTGAGE OR RENT ON TIME?: NO

## 2025-07-09 SDOH — ECONOMIC STABILITY: FOOD INSECURITY: HOW HARD IS IT FOR YOU TO PAY FOR THE VERY BASICS LIKE FOOD, HOUSING, MEDICAL CARE, AND HEATING?: NOT HARD AT ALL

## 2025-07-09 SDOH — ECONOMIC STABILITY: FOOD INSECURITY: WITHIN THE PAST 12 MONTHS, YOU WORRIED THAT YOUR FOOD WOULD RUN OUT BEFORE YOU GOT THE MONEY TO BUY MORE.: NEVER TRUE

## 2025-07-09 SDOH — ECONOMIC STABILITY: HOUSING INSECURITY: IN THE PAST 12 MONTHS, HOW MANY TIMES HAVE YOU MOVED WHERE YOU WERE LIVING?: 1

## 2025-07-09 SDOH — ECONOMIC STABILITY: TRANSPORTATION INSECURITY: IN THE PAST 12 MONTHS, HAS LACK OF TRANSPORTATION KEPT YOU FROM MEDICAL APPOINTMENTS OR FROM GETTING MEDICATIONS?: NO

## 2025-07-09 ASSESSMENT — PAIN SCALES - GENERAL: PAINLEVEL_OUTOF10: NO PAIN

## 2025-07-09 ASSESSMENT — FIBROSIS 4 INDEX: FIB4 SCORE: 1.67

## 2025-07-09 ASSESSMENT — PATIENT HEALTH QUESTIONNAIRE - PHQ9: CLINICAL INTERPRETATION OF PHQ2 SCORE: 0

## 2025-07-09 ASSESSMENT — ACTIVITIES OF DAILY LIVING (ADL): LACK_OF_TRANSPORTATION: NO

## 2025-07-09 NOTE — PROGRESS NOTES
Comprehensive Health Assessment Program     Kacey Whelan is a 79 y.o. here for her comprehensive health assessment.    Patient Active Problem List    Diagnosis Date Noted    Dysuria 10/18/2023    Mixed stress and urge urinary incontinence 10/02/2019    Age-related osteoporosis without current pathological fracture 2017    Vitamin D deficiency 2017    Type 2 diabetes mellitus with diabetic microalbuminuria, without long-term current use of insulin (HCC) 10/14/2016    Hypercholesteremia 10/14/2016    HTN (hypertension) 2015    Aortic stenosis 2015    Hearing loss 2015       Current Medications[1]       Current supplements as per medication list.     Allergies:   Latex  Social History[2]  Family History   Problem Relation Age of Onset    Cancer Mother         breast cancer    Breast Cancer Mother     Heart Disease Father 46        MI    Heart Attack Father     Heart Disease Sister         2 heart surgery    Diabetes Brother     Cancer Brother             Heart Disease Brother         HEART STENT    Diabetes Brother     Diabetes Brother     Cancer Brother         anal    Cancer Brother             Cancer Brother              Kacey  has a past medical history of Anesthesia (2024), Cataract, Diabetes ([facility]), Hearing loss, Heart burn, Heart murmur, High cholesterol, Hyperlipidemia, Hypertension, Osteoporosis, and Urinary incontinence.    She has no past medical history of Acute nasopharyngitis, Anginal syndrome ([facility]), Arrhythmia, Arthritis, Asthma, Blood clotting disorder ([facility]), Bowel habit changes, Breath shortness, Bronchitis, Cancer ([facility]), Carcinoma in situ of respiratory system, Congestive heart failure ([facility]), Continuous ambulatory peritoneal dialysis status ([facility]), Coughing blood, Dental disorder, Dialysis patient ([facility]), Disorder of thyroid, Emphysema of lung ([facility]), Glaucoma, Gynecological disorder, Heart valve disease, Hemorrhagic disorder ([facility]),  Hepatitis A, Hepatitis B, Hepatitis C, Hiatus hernia syndrome, Indigestion, Infectious disease, Jaundice, Myocardial infarct (HCC), Pacemaker, Pain, Pneumonia, Pregnant, Psychiatric problem, Renal disorder, Rheumatic fever, Seizure (HCC), Sleep apnea, Snoring, Stroke (HCC), Tuberculosis, or Urinary bladder disorder.   Past Surgical History[3]    Screening:  In the last six months have you experienced any leakage of urine? Yes    Depression Screening  Little interest or pleasure in doing things?  0 - not at all  Feeling down, depressed , or hopeless? 0 - not at all  Trouble falling or staying asleep, or sleeping too much?     Feeling tired or having little energy?     Poor appetite or overeating?     Feeling bad about yourself - or that you are a failure or have let yourself or your family down?    Trouble concentrating on things, such as reading the newspaper or watching television?    Moving or speaking so slowly that other people could have noticed.  Or the opposite - being so fidgety or restless that you have been moving around a lot more than usual?     Thoughts that you would be better off dead, or of hurting yourself?     Patient Health Questionnaire Score:      If depressive symptoms identified deferred to follow up visit unless specifically addressed in assessment and plan.    Interpretation of PHQ-9 Total Score   Score Severity   1-4 No Depression   5-9 Mild Depression   10-14 Moderate Depression   15-19 Moderately Severe Depression   20-27 Severe Depression    Screening for Cognitive Impairment  Do you or any of your friends or family members have any concern about your memory? No  Three Minute Recall (Village, Kitchen, Baby) 3/3    Tonny clock face with all 12 numbers and set the hands to show 10 minutes past 11.  Yes    Cognitive concerns identified deferred for follow up unless specifically addressed in assessment and plan.    Fall Risk Assessment  Has the patient had two or more falls in the last year  or any fall with injury in the last year?  No    Safety Assessment  Do you always wear your seatbelt?  Yes  Any changes to home needed to function safely? No  Difficulty hearing.  Yes  Patient counseled about all safety risks that were identified.    Functional Assessment ADLs  Are there any barriers preventing you from cooking for yourself or meeting nutritional needs?  No.    Are there any barriers preventing you from driving safely or obtaining transportation?  No.    Are there any barriers preventing you from using a telephone or calling for help?  No    Are there any barriers preventing you from shopping?  No.    Are there any barriers preventing you from taking care of your own finances?  No    Are there any barriers preventing you from managing your medications?  No    Are there any barriers preventing you from showering, bathing or dressing yourself? No    Are there any barriers preventing you from doing housework or laundry? No    Are there any barriers preventing you from using the toilet?No    Are you currently engaging in any exercise or physical activity?  Yes.  Walks every day. She has to walk everywhere because she does not have a car. Does stretches at her kitchen     Self-Assessment of Health  What is your perception of your health? Good    Do you sleep more than six hours a night? No    In the past 7 days, how much did pain keep you from doing your normal work? None    Do you spend quality time with family or friends (virtually or in person)? Yes    Do you usually eat a heart healthy diet that constists of a variety of fruits, vegetables, whole grains and fiber? Yes    Do you eat foods high in fat and/or Fast Food more than three times per week? No    How concerned are you that your medical conditions are not being well managed? a little    Are you worried that in the next 2 months, you may not have stable housing that you own, rent, or stay in as part of a household? No        Advance Care  Planning  Do you have an Advance Directive, Living Will, Durable Power of , or POLST? No                 Health Maintenance Summary            Current Care Gaps       Zoster (Shingles) Vaccines (1 of 2) Overdue since 11/9/2014 09/14/2014  Imm Admin: Varicella Vaccine Live    10/15/2013  Imm Admin: Varicella Vaccine Live    09/02/2011  Imm Admin: Varicella Vaccine Live              Hepatitis B Vaccine (Hep B) (2 of 3 - 19+ 3-dose series) Overdue since 11/15/2023      10/18/2023  Imm Admin: Hepatitis B Vaccine (Adol/Adult)              COVID-19 Vaccine (3 - 2024-25 season) Overdue since 9/1/2024 04/21/2021  Imm Admin: MODERNA SARS-COV-2 VACCINE (12+)    02/26/2021  Imm Admin: MODERNA SARS-COV-2 VACCINE (12+)              Diabetes: Retinopathy Screening (Every 6 Months) Overdue since 10/9/2024      02/04/2025  Order placed for POCT Retinal Eye Exam by Doug Tijerina M.D.    04/09/2024  AMB EXTERNAL RETINAL SCREENING RESULTS    04/09/2024  AMB EXTERNAL RETINAL SCREENING RESULTS    11/14/2023  AMB EXTERNAL RETINAL SCREENING RESULTS    08/01/2022  REFERRAL FOR RETINAL SCREENING EXAM     Only the first 5 history entries have been loaded, but more history exists.                    Awaiting Completion       Bone Density Scan (Every 2 Years) Scheduled for 7/28/2025 02/04/2025  Order placed for DS-BONE DENSITY STUDY (DEXA) by Doug Tijerina M.D.    06/16/2022  DS-BONE DENSITY STUDY (DEXA)    10/08/2019  DS-BONE DENSITY STUDY (DEXA)    02/14/2017  DS-BONE DENSITY STUDY (DEXA)                      Upcoming       Influenza Vaccine (1) Next due on 9/1/2025 08/03/2024  Imm Admin: Influenza high-dose trivalent (PF)    08/23/2023  Imm Admin: Influenza Vaccine Adult HD    08/24/2022  Imm Admin: Influenza Vaccine Adult HD    09/26/2020  Imm Admin: Influenza Vaccine Adult HD    09/03/2019  Imm Admin: Influenza Vaccine Adult HD      Only the first 5 history entries have been loaded, but more history  exists.              A1c Screening (Every 6 Months) Next due on 9/4/2025 03/04/2025  HEMOGLOBIN A1C    11/16/2024  POCT Hemoglobin A1C    03/21/2024  POCT Hemoglobin A1C    10/18/2023  POCT Hemoglobin A1C    10/13/2022  POCT Hemoglobin A1C      Only the first 5 history entries have been loaded, but more history exists.              Diabetes: Monofilament / LE Exam (Yearly) Next due on 2/4/2026 02/04/2025  Diabetic Monofilament LE Exam    10/13/2022  SmartData: WORKFLOW - DIABETES - DIABETIC FOOT EXAM PERFORMED    10/13/2022  Diabetic Monofilament Lower Extremity Exam    10/07/2020  SmartData: WORKFLOW - DIABETES - DIABETIC FOOT EXAM PERFORMED    05/22/2018  Diabetic Monofilament Lower Extremity Exam      Only the first 5 history entries have been loaded, but more history exists.              Fasting Lipid Profile (Yearly) Next due on 5/1/2026 05/01/2025  Lipid Profile    11/20/2024  Lipid Profile    06/21/2024  Lipid Profile    11/04/2023  Lipid Profile    11/04/2022  Lipid Profile      Only the first 5 history entries have been loaded, but more history exists.              Diabetes: Urine Protein Screening (Yearly) Next due on 5/1/2026 05/01/2025  MICROALBUMIN CREAT RATIO URINE    11/20/2024  MICROALBUMIN CREAT RATIO URINE    11/16/2024  MICROALBUMIN CREAT RATIO URINE    11/04/2023  MICROALBUMIN CREAT RATIO URINE    11/04/2022  MICROALBUMIN CREAT RATIO URINE      Only the first 5 history entries have been loaded, but more history exists.              SERUM CREATININE (Yearly) Next due on 5/1/2026 05/01/2025  Comp Metabolic Panel    11/20/2024  Comp Metabolic Panel    07/08/2024  Basic Metabolic Panel    11/04/2023  Comp Metabolic Panel    11/04/2022  Comp Metabolic Panel      Only the first 5 history entries have been loaded, but more history exists.              Annual Wellness Visit (Yearly) Next due on 7/9/2026 07/09/2025  Level of Service: [place] ANNUAL WELLNESS VISIT-INCLUDES PPPS  SUBSEQUE*    03/21/2024  Level of Service: [place] ANNUAL WELLNESS VISIT-INCLUDES PPPS SUBSEQUE*    10/18/2023  Visit Dx: Medicare annual wellness visit, subsequent    10/13/2022  Visit Dx: Medicare annual wellness visit, subsequent    10/13/2021  Visit Dx: Medicare annual wellness visit, subsequent      Only the first 5 history entries have been loaded, but more history exists.              IMM DTaP/Tdap/Td Vaccine (2 - Td or Tdap) Next due on 10/18/2033      10/18/2023  Imm Admin: Tdap Vaccine                      Completed or No Longer Recommended       Hepatitis C Screening  Completed      10/20/2020  Hepatitis C Antibody component of HEP C VIRUS ANTIBODY              Pneumococcal Vaccine: 50+ Years (Series Information) Completed      08/03/2024  Imm Admin: Pneumococcal Conjugate Vaccine (PCV20)    03/30/2017  Imm Admin: Pneumococcal Conjugate Vaccine (Prevnar/PCV-13)    09/05/2016  Imm Admin: Pneumococcal polysaccharide vaccine (PPSV-23)              Hepatitis A Vaccine (Hep A) (Series Information) Aged Out      No completion history exists for this topic.              HPV Vaccines (Series Information) Aged Out     No completion history exists for this topic.              Polio Vaccine (Inactivated Polio) (Series Information) Aged Out     No completion history exists for this topic.              Meningococcal Immunization (Series Information) Aged Out     No completion history exists for this topic.              Meningococcal B Vaccine (Series Information) Aged Out     No completion history exists for this topic.              Mammogram  Scheduled for 7/28/2025        Frequency changed to Never automatically (Topic No Longer Applies)    06/11/2025  Order placed for MA-SCREENING MAMMO BILAT W/TOMOSYNTHESIS W/CAD by Renata Armstrong    06/16/2022  MA-SCREENING MAMMO BILAT W/TOMOSYNTHESIS W/CAD    05/03/2019  MA-SCREENING MAMMO BILAT W/TOMOSYNTHESIS W/CAD    02/14/2017  MA-MAMMO SCREENING BILAT W/ABIMBOLA W/CAD     Only the  "first 5 history entries have been loaded, but more history exists.                          Patient Care Team:  Doug Tijerina M.D. as PCP - General (Internal Medicine)  Hi Medina M.D. (Inactive) as Consulting Physician (Ophthalmology)  Zaire Brown M.D. (Otolaryngology)    Financial Resource Strain: Low Risk  (7/9/2025)    Overall Financial Resource Strain (CARDIA)     Difficulty of Paying Living Expenses: Not hard at all      Transportation Needs: No Transportation Needs (7/9/2025)    PRAPARE - Transportation     Lack of Transportation (Medical): No     Lack of Transportation (Non-Medical): No      Food Insecurity: No Food Insecurity (7/9/2025)    Hunger Vital Sign     Worried About Running Out of Food in the Last Year: Never true     Ran Out of Food in the Last Year: Never true        Encounter Vitals  Blood Pressure : 118/70  Pulse: 72  Pulse Oximetry: 96 %  Weight: 67.1 kg (148 lb)  Height: 157.5 cm (5' 2\")  BMI (Calculated): 27.07  Pain Score: No pain     Physical Exam  Constitutional:       General: She is not in acute distress.     Appearance: Normal appearance.   HENT:      Head: Normocephalic and atraumatic.   Eyes:      Extraocular Movements: Extraocular movements intact.      Pupils: Pupils are equal, round, and reactive to light.   Cardiovascular:      Rate and Rhythm: Normal rate and regular rhythm.      Heart sounds: Murmur heard.   Pulmonary:      Breath sounds: Normal breath sounds.   Musculoskeletal:      Right lower leg: No edema.      Left lower leg: No edema.   Neurological:      Mental Status: She is alert and oriented to person, place, and time.   Psychiatric:         Mood and Affect: Mood normal.         Behavior: Behavior normal.       Assessment and Plan. The following treatment and monitoring plan is recommended:  Age-related osteoporosis without current pathological fracture  Chronic, stable. Last DEXA 2022 with new one scheduled. She does take calcium and vitamin D " supplementation. Does engage in weightbearing exercise. No hx of fragility fractures. Continue to follow up with PCP as previously scheduled.      HTN (hypertension)  Chronic, Stable. BP in office today is 118/70. She does occasionally check her BP at home. She currently takes lisinopril 10 mg daily. Follow up with PCP for continued monitoring and management.      Hypercholesteremia  Chronic, stable. She currently takes atorvastatin 20 mg daily. We discussed her dietary/lifestyle regimen. Follow up with PCP for continued monitoring and management.  Lab Results   Component Value Date/Time    CHOLSTRLTOT 142 05/01/2025 07:33 AM    TRIGLYCERIDE 96 05/01/2025 07:33 AM    HDL 57 05/01/2025 07:33 AM    LDL 66 05/01/2025 07:33 AM         Type 2 diabetes mellitus with diabetic microalbuminuria, without long-term current use of insulin (HCC)  Chronic, stable. Last A1c in March 2025 was 7.0. Currently takes metformin 500 mg daily. Was told to increase to 1000 mg daily but she reports she was getting dizzy and upset stomach so she stopped. Has not had retinal exam this year. Unable to complete in office today due to machine error. Last microalbumin creat ratio in May 2025 was 34. Her PCP does her feet exams. She is on a statin. We discussed diet and lifestyle habits in depth today. Follow up with PCP for continued monitoring and management.  Lab Results   Component Value Date/Time    URCREAT 198.00 05/01/2025 0732    MICROALBUR 6.7 05/01/2025 0732    MALBCRT 34 (H) 05/01/2025 0732           Services suggested: No services needed at this time  Health Care Screening: Age-appropriate preventive services recommended by USPTF and ACIP covered by Medicare were discussed today. Services ordered if indicated and agreed upon by the patient.  Referrals offered: Community-based lifestyle interventions to reduce health risks and promote self-management and wellness, fall prevention, nutrition, physical activity, tobacco-use cessation,  weight loss, and mental health services as per orders if indicated.    Discussion today about general wellness and lifestyle habits:    Prevent falls and reduce trip hazards; Cautioned about securing or removing rugs.  Have a working fire alarm and carbon monoxide detector.  Engage in regular physical activity and social activities.    Follow-up: Return for appointment with Primary Care Provider as needed..              [1]   Current Outpatient Medications   Medication Sig Dispense Refill    Cinnamon 500 MG Cap Take  by mouth.      atorvastatin (LIPITOR) 20 MG Tab Take 1 Tablet by mouth every evening. 90 Tablet 3    metFORMIN ER (GLUCOPHAGE XR) 500 MG TABLET SR 24 HR Take 2 Tablets by mouth every day. 180 Tablet 3    lisinopril (PRINIVIL) 10 MG Tab Take 1 Tablet by mouth every day. 90 Tablet 3    Cholecalciferol (VITAMIN D) 2000 UNIT Tab Take 2,000 Units by mouth every day.     *MEDICATION INSTRUCTIONS FOR SURGERY/PROCEDURE SCHEDULED FOR 7/10/2024   DO NOT TAKE 7 DAYS PRIOR TO SURGERY      CRANBERRY PO Take  by mouth every day.       *MEDICATION INSTRUCTIONS FOR SURGERY/PROCEDURE SCHEDULED FOR 7/10/2024   DO NOT TAKE 7 DAYS PRIOR TO SURGERY      omeprazole (PRILOSEC) 20 MG delayed-release capsule Take 20 mg by mouth every day.       *MEDICATION INSTRUCTIONS FOR SURGERY/PROCEDURE SCHEDULED FOR 7/10/2024.   OK TO CONTINUE TAKING PRIOR TO SURGERY AND DAY OF SURGERY      Calcium Carb-Cholecalciferol (CALCIUM 600 + D PO) Take 2 Tablets by mouth every day.       *MEDICATION INSTRUCTIONS FOR SURGERY/PROCEDURE SCHEDULED FOR 7/10/2024   DO NOT TAKE 7 DAYS PRIOR TO SURGERY      ibuprofen (MOTRIN) 200 MG Tab Take 200 mg by mouth every 6 hours as needed.       *MEDICATION INSTRUCTIONS FOR SURGERY/PROCEDURE SCHEDULED FOR 7/10/2024   DO NOT TAKE 5 DAYS PRIOR TO SURGERY      [employer] TRIPLE MAGNESIUM COMPLEX PO every day.       *MEDICATION INSTRUCTIONS FOR SURGERY/PROCEDURE SCHEDULED FOR 7/10/2024   DO NOT TAKE 7 DAYS PRIOR TO SURGERY       aspirin 81 MG tablet Take 1 Tab by mouth every day. 100 Tab 3     No current facility-administered medications for this visit.   [2]   Social History  Tobacco Use    Smoking status: Never    Smokeless tobacco: Never   Vaping Use    Vaping status: Never Used   Substance Use Topics    Alcohol use: Never    Drug use: Never   [3]   Past Surgical History:  Procedure Laterality Date    VITRECTOMY POSTERIOR Right 7/10/2024    Procedure: RIGHT EYE VITRECTOMY, MEMBRANE DISSECTION, BRILLIANT BLUE G;  Surgeon: Victoria Santa M.D.;  Location: SURGERY SAME DAY [facility];  Service: Ophthalmology    CATARACT PHACO WITH IOL      NO PERTINENT PAST SURGICAL HISTORY  3 C sections    PRIMARY C SECTION      3 c section    TONSILLECTOMY

## 2025-07-28 ENCOUNTER — HOSPITAL ENCOUNTER (OUTPATIENT)
Dept: RADIOLOGY | Facility: MEDICAL CENTER | Age: 80
End: 2025-07-28
Attending: STUDENT IN AN ORGANIZED HEALTH CARE EDUCATION/TRAINING PROGRAM
Payer: MEDICARE

## 2025-07-28 DIAGNOSIS — M81.0 AGE-RELATED OSTEOPOROSIS WITHOUT CURRENT PATHOLOGICAL FRACTURE: ICD-10-CM

## 2025-07-28 DIAGNOSIS — Z12.31 ENCOUNTER FOR MAMMOGRAM TO ESTABLISH BASELINE MAMMOGRAM: ICD-10-CM

## 2025-07-28 DIAGNOSIS — Z78.0 POST-MENOPAUSAL: ICD-10-CM

## 2025-07-28 PROCEDURE — 77080 DXA BONE DENSITY AXIAL: CPT

## 2025-07-28 PROCEDURE — 77063 BREAST TOMOSYNTHESIS BI: CPT

## (undated) DEVICE — LACTATED RINGERS INJ 1000 ML - (14EA/CA 60CA/PF)

## (undated) DEVICE — PACK VITRECTOMY (1EA/CA)

## (undated) DEVICE — FORCEP ILM REVOLUTION DISPOSABLE 25G (6EA/BX)

## (undated) DEVICE — CANISTER SUCTION 3000ML MECHANICAL FILTER AUTO SHUTOFF MEDI-VAC NONSTERILE LF DISP  (40EA/CA)

## (undated) DEVICE — SENSOR OXIMETER ADULT SPO2 RD SET (20EA/BX)

## (undated) DEVICE — TOWEL STOP TIMEOUT SAFETY FLAG (40EA/CA)

## (undated) DEVICE — PAD EYE GAUZE COVERED OVAL 1 5/8 X 2 5/8" STERILE"

## (undated) DEVICE — CANISTER SUCTION RIGID RED 1500CC (40EA/CA)

## (undated) DEVICE — TUBING CLEARLINK DUO-VENT - C-FLO (48EA/CA)

## (undated) DEVICE — MASK AIRWAY FLEXIBLE SINGLE-USE SIZE 3 CHILDREN (10EA/BX)

## (undated) DEVICE — GOWN WARMING STANDARD FLEX - (30/CA)

## (undated) DEVICE — GLOVE SZ 7 BIOGEL PI MICRO - PF LF (50PR/BX 4BX/CA)

## (undated) DEVICE — FORCEPS SURGICAL GRIESHABER REVOLUTION SERRATED 25G (6EA/BX)

## (undated) DEVICE — TUBE CONNECTING SUCTION - CLEAR PLASTIC STERILE 72 IN (50EA/CA)

## (undated) DEVICE — SUCTION INSTRUMENT YANKAUER BULBOUS TIP W/O VENT (50EA/CA)

## (undated) DEVICE — MASK OXYGEN VNYL ADLT MED CONC WITH 7 FOOT TUBING  - (50EA/CA)

## (undated) DEVICE — SLEEVE VASO CALF MED - (10PR/CA)

## (undated) DEVICE — CANNULA O2 COMFORT SOFT EAR ADULT 7 FT TUBING (50/CA)

## (undated) DEVICE — WATER IRRIGATION STERILE 1000ML (12EA/CA)

## (undated) DEVICE — TIP NEEDLE SOFT 25G X 0.8MM (10EA/BX)

## (undated) DEVICE — KIT  I.V. START (100EA/CA)

## (undated) DEVICE — LENS GRIESHABER MACULAR

## (undated) DEVICE — SET LEADWIRE 5 LEAD BEDSIDE DISPOSABLE ECG (1SET OF 5/EA)

## (undated) DEVICE — PACK VITRECTOMY 25G 20K V W (1EA/BX)

## (undated) DEVICE — SODIUM CHL IRRIGATION 0.9% 1000ML (12EA/CA)